# Patient Record
Sex: FEMALE | Race: WHITE | Employment: FULL TIME | URBAN - METROPOLITAN AREA
[De-identification: names, ages, dates, MRNs, and addresses within clinical notes are randomized per-mention and may not be internally consistent; named-entity substitution may affect disease eponyms.]

---

## 2017-01-05 ENCOUNTER — OFFICE VISIT (OUTPATIENT)
Dept: PHYSICAL THERAPY | Facility: HOSPITAL | Age: 42
End: 2017-01-05
Attending: FAMILY MEDICINE
Payer: COMMERCIAL

## 2017-01-05 ENCOUNTER — OFFICE VISIT (OUTPATIENT)
Dept: FAMILY MEDICINE CLINIC | Facility: CLINIC | Age: 42
End: 2017-01-05

## 2017-01-05 VITALS
HEIGHT: 64 IN | HEART RATE: 110 BPM | TEMPERATURE: 99 F | BODY MASS INDEX: 40.63 KG/M2 | WEIGHT: 238 LBS | SYSTOLIC BLOOD PRESSURE: 130 MMHG | DIASTOLIC BLOOD PRESSURE: 72 MMHG

## 2017-01-05 DIAGNOSIS — IMO0001 UNCONTROLLED DIABETES MELLITUS TYPE 2 WITHOUT COMPLICATIONS, UNSPECIFIED LONG TERM INSULIN USE STATUS: Primary | ICD-10-CM

## 2017-01-05 DIAGNOSIS — M79.602 LEFT ARM PAIN: ICD-10-CM

## 2017-01-05 DIAGNOSIS — S49.92XA ARM INJURY, LEFT, INITIAL ENCOUNTER: Primary | ICD-10-CM

## 2017-01-05 DIAGNOSIS — Z00.00 ROUTINE MEDICAL EXAM: ICD-10-CM

## 2017-01-05 DIAGNOSIS — E78.5 OTHER AND UNSPECIFIED HYPERLIPIDEMIA: ICD-10-CM

## 2017-01-05 PROCEDURE — 97110 THERAPEUTIC EXERCISES: CPT

## 2017-01-05 PROCEDURE — 97014 ELECTRIC STIMULATION THERAPY: CPT

## 2017-01-05 PROCEDURE — 97162 PT EVAL MOD COMPLEX 30 MIN: CPT

## 2017-01-05 PROCEDURE — 99396 PREV VISIT EST AGE 40-64: CPT | Performed by: FAMILY MEDICINE

## 2017-01-05 RX ORDER — ATORVASTATIN CALCIUM 40 MG/1
TABLET, FILM COATED ORAL
Qty: 90 TABLET | Refills: 4 | Status: SHIPPED | OUTPATIENT
Start: 2017-01-05 | End: 2018-02-03

## 2017-01-05 RX ORDER — LISINOPRIL 5 MG/1
5 TABLET ORAL
Qty: 90 TABLET | Refills: 4 | Status: SHIPPED | OUTPATIENT
Start: 2017-01-05 | End: 2018-02-03

## 2017-01-05 NOTE — PROGRESS NOTES
HPI:   Luly Morton is a 37year old female who presents for a complete physical exam.    Here for regular physical. Having problems still with left arm/shoulder. Will see PT today.       Wt Readings from Last 3 Encounters:  01/05/17 : 238 lb (107.956 kg rhinitis    • Diabetes [facility])           Past Surgical History    [address]    OTHER      Comment carpal tunnel release    OTHER SURGICAL HISTORY      Comment IVF      [phone] intact    ASSESSMENT AND PLAN:   Angel Bautista is a 37year old female who presents for a complete physical exam.  1. Uncontrolled diabetes mellitus type 2 without complications, unspecified long term insulin use status ([facility]  Management Per Adams Diss

## 2017-01-05 NOTE — PROGRESS NOTES
SHOULDER EVALUATION:   Referring Physician: Dr. Bassam Engel  Diagnosis: L shoulder pain Date of Service: 1/5/2017     PATIENT SUMMARY:   Dar Schreiber is a 37year old y/o female who presents to therapy today on 1/5/17with complaints of L shoulder pain after f deg        PROM:  Shoulder     Flexion:  L 93 deg  Abduction: L 104  ER: L 42  IR: L 40 deg      Joint mobility:dec in [address] inf/post    Flexibility: nt    Strength/MMT:  Shoulder Scapular   Flexion: L 3/5  Abduction: L 3/5  ER: L 4-/5  IR:  L 4-/5 NT     Tod treatment options and has agreed to actively participate in planning and for this course of care. Thank you for your referral. Please co-sign or sign and return this letter via fax as soon as possible to [phone].  If you have any questions, please c

## 2017-01-12 ENCOUNTER — OFFICE VISIT (OUTPATIENT)
Dept: PHYSICAL THERAPY | Facility: HOSPITAL | Age: 42
End: 2017-01-12
Attending: FAMILY MEDICINE
Payer: COMMERCIAL

## 2017-01-12 DIAGNOSIS — S49.92XA ARM INJURY, LEFT, INITIAL ENCOUNTER: Primary | ICD-10-CM

## 2017-01-12 PROCEDURE — 97140 MANUAL THERAPY 1/> REGIONS: CPT

## 2017-01-12 PROCEDURE — 97110 THERAPEUTIC EXERCISES: CPT

## 2017-01-12 NOTE — PROGRESS NOTES
Dx: L shoulder pain        Authorized # of Visits:  2/8         Next MD visit: none scheduled  Fall Risk: standard         Precautions: n/a           Medication Changes since last visit?: No    Subjective: \"My arm still hurts a lot. It might be worse. \"

## 2017-01-13 RX ORDER — LISINOPRIL 5 MG/1
TABLET ORAL
Qty: 30 TABLET | Refills: 0 | OUTPATIENT
Start: 2017-01-13

## 2017-01-17 ENCOUNTER — OFFICE VISIT (OUTPATIENT)
Dept: PHYSICAL THERAPY | Facility: HOSPITAL | Age: 42
End: 2017-01-17
Attending: FAMILY MEDICINE
Payer: COMMERCIAL

## 2017-01-17 DIAGNOSIS — S49.92XA ARM INJURY, LEFT, INITIAL ENCOUNTER: Primary | ICD-10-CM

## 2017-01-17 PROCEDURE — 97140 MANUAL THERAPY 1/> REGIONS: CPT

## 2017-01-17 PROCEDURE — 97110 THERAPEUTIC EXERCISES: CPT

## 2017-01-17 NOTE — PROGRESS NOTES
Dx: L shoulder pain        Authorized # of Visits:  3/8         Next MD visit: none scheduled  Fall Risk: standard         Precautions: n/a           Medication Changes since last visit?: No    Subjective: \"My arm still hurts but feel like more movement\" left shoulder PROM to [facility] and pain management.  Discussed pt POC with the PT    Charges: 2 TE, 1 man       Total Timed Treatment: 45 min  Total Treatment Time: 45min

## 2017-01-18 RX ORDER — LISINOPRIL 5 MG/1
TABLET ORAL
Qty: 90 TABLET | Refills: 3 | OUTPATIENT
Start: 2017-01-18

## 2017-01-19 ENCOUNTER — TELEPHONE (OUTPATIENT)
Dept: FAMILY MEDICINE CLINIC | Facility: CLINIC | Age: 42
End: 2017-01-19

## 2017-01-19 ENCOUNTER — OFFICE VISIT (OUTPATIENT)
Dept: PHYSICAL THERAPY | Facility: HOSPITAL | Age: 42
End: 2017-01-19
Attending: FAMILY MEDICINE
Payer: COMMERCIAL

## 2017-01-19 DIAGNOSIS — S49.92XA ARM INJURY, LEFT, INITIAL ENCOUNTER: Primary | ICD-10-CM

## 2017-01-19 PROCEDURE — 97140 MANUAL THERAPY 1/> REGIONS: CPT

## 2017-01-19 PROCEDURE — 97110 THERAPEUTIC EXERCISES: CPT

## 2017-01-19 NOTE — TELEPHONE ENCOUNTER
I do sports medicine. Let her know I will work her in for a focused visit and adjust the arm either today or tomorrow. Whatever one she wishes.

## 2017-01-19 NOTE — TELEPHONE ENCOUNTER
for  please see pt message below and advise.  message from OV note see below:    3. Left arm pain  Will start PT today.  If not improving within a few weeks, pt to send me a message or call.    [facility] ext [phone] I wanted to ask pt if this ca

## 2017-01-19 NOTE — TELEPHONE ENCOUNTER
Pt states still has same arm pain, has muscle knot  Today was 4th PT session- range in mortion better, but no improvement with arm pain   Questioning if should continue PT?  get MRI?

## 2017-01-19 NOTE — PROGRESS NOTES
Dx: L shoulder pain        Authorized # of Visits:  4/8         Next MD visit: none scheduled  Fall Risk: standard         Precautions: n/a           Medication Changes since last visit?: No    Subjective: \"I feel increased shoulder movement but continued shoulder AROM IR to 70 deg to be able to reach in back pocket, tuck in shirt, and turn steering wheel without pain  · Pt will improve shoulder strength throughout to 5/5 to improve function with ADLs including putting milk into fridge    · Pt will demonstr

## 2017-01-20 ENCOUNTER — OFFICE VISIT (OUTPATIENT)
Dept: FAMILY MEDICINE CLINIC | Facility: CLINIC | Age: 42
End: 2017-01-20

## 2017-01-20 VITALS
SYSTOLIC BLOOD PRESSURE: 126 MMHG | DIASTOLIC BLOOD PRESSURE: 84 MMHG | WEIGHT: 244 LBS | HEIGHT: 64 IN | BODY MASS INDEX: 41.66 KG/M2 | HEART RATE: 93 BPM | TEMPERATURE: 99 F

## 2017-01-20 DIAGNOSIS — M25.512 ACUTE PAIN OF LEFT SHOULDER: Primary | ICD-10-CM

## 2017-01-20 DIAGNOSIS — M75.82 ROTATOR CUFF TENDINITIS, LEFT: ICD-10-CM

## 2017-01-20 DIAGNOSIS — M75.42 ROTATOR CUFF IMPINGEMENT SYNDROME OF LEFT SHOULDER: ICD-10-CM

## 2017-01-20 PROCEDURE — 99213 OFFICE O/P EST LOW 20 MIN: CPT | Performed by: FAMILY MEDICINE

## 2017-01-20 PROCEDURE — 20610 DRAIN/INJ JOINT/BURSA W/O US: CPT | Performed by: FAMILY MEDICINE

## 2017-01-20 RX ORDER — TRIAMCINOLONE ACETONIDE 40 MG/ML
80 INJECTION, SUSPENSION INTRA-ARTICULAR; INTRAMUSCULAR ONCE
Status: COMPLETED | OUTPATIENT
Start: 2017-01-20 | End: 2017-01-20

## 2017-01-20 RX ADMIN — TRIAMCINOLONE ACETONIDE 80 MG: 40 INJECTION, SUSPENSION INTRA-ARTICULAR; INTRAMUSCULAR at 16:40:00

## 2017-01-20 NOTE — PROGRESS NOTES
Patient ID: Angel Bautista is a 37year old female.     HPI  Patient presents with:  Arm Pain: Left      On December 17, 2016 she [place] and she tripped over a child at the park dis She appears well-developed and well-nourished. No distress. Neurological: She is alert and oriented to person, place, and time. Vitals reviewed.     Blood pressure 126/84, pulse 93, temperature 99 °F (37.2 °C), temperature source Oral, height 5' 4\" (1. marcaine    Sterile dressing:  Pressure - Yes  Adhesive - Yes    Patient response:  The patient did tolerate the procedure well.     Improvement by site: (IF REEVALUATED AFTER INJECTION)  Site 1: 40% improvement    Plan:  Appropriate post injection instruct

## 2017-01-23 ENCOUNTER — TELEPHONE (OUTPATIENT)
Dept: FAMILY MEDICINE CLINIC | Facility: CLINIC | Age: 42
End: 2017-01-23

## 2017-01-23 ENCOUNTER — APPOINTMENT (OUTPATIENT)
Dept: PHYSICAL THERAPY | Facility: HOSPITAL | Age: 42
End: 2017-01-23
Attending: FAMILY MEDICINE
Payer: COMMERCIAL

## 2017-01-23 DIAGNOSIS — M25.612 DECREASED RANGE OF MOTION OF LEFT SHOULDER: ICD-10-CM

## 2017-01-23 DIAGNOSIS — S40.012D CONTUSION OF LEFT SHOULDER, SUBSEQUENT ENCOUNTER: Primary | ICD-10-CM

## 2017-01-23 DIAGNOSIS — G89.29 CHRONIC LEFT SHOULDER PAIN: ICD-10-CM

## 2017-01-23 DIAGNOSIS — R29.898 WEAKNESS OF SHOULDER: ICD-10-CM

## 2017-01-23 DIAGNOSIS — M25.512 CHRONIC LEFT SHOULDER PAIN: ICD-10-CM

## 2017-01-23 NOTE — TELEPHONE ENCOUNTER
Call transferred by CSS: Dr Sravan San, pt calling back stating the pain is actually located in the upper area of the left bicep, not the shoulder. Asking if a different MRI should be ordered that will capture that area.  Please advise

## 2017-01-23 NOTE — TELEPHONE ENCOUNTER
LB/VS please see message below     Patient states that she has been doing the therapy and did receive the cortisone shot and has not any relief, states her pain is the same. No swelling, no numbness, tingling, no new s/sx.     Asking if MRI can be done    P

## 2017-01-23 NOTE — TELEPHONE ENCOUNTER
Patient is doing px therapy and seen Dr Adolfo Ibrahim [facility] for Cortizone shot Friday. She said she does not feel any better. Patient would like to have an MRI done. Still having pain when lifting her arm, no improvement.

## 2017-01-23 NOTE — TELEPHONE ENCOUNTER
Jana Espinoza, I ordered an MRI of your left shoulder. Once we get this done I get the results I will have you come in to discuss.

## 2017-01-26 ENCOUNTER — OFFICE VISIT (OUTPATIENT)
Dept: PHYSICAL THERAPY | Facility: HOSPITAL | Age: 42
End: 2017-01-26
Attending: FAMILY MEDICINE
Payer: COMMERCIAL

## 2017-01-26 DIAGNOSIS — S49.92XA ARM INJURY, LEFT, INITIAL ENCOUNTER: Primary | ICD-10-CM

## 2017-01-26 PROCEDURE — 97140 MANUAL THERAPY 1/> REGIONS: CPT

## 2017-01-26 PROCEDURE — 97110 THERAPEUTIC EXERCISES: CPT

## 2017-01-26 NOTE — PROGRESS NOTES
Dx: L shoulder pain        Authorized # of Visits:  6/8         Next MD visit: none scheduled  Fall Risk: standard         Precautions: n/a           Medication Changes since last visit?: No    Subjective: \"I am getting a MRI this week.  I feel like I have compliant with comprehensive HEP to maintain progress achieved in PT    Plan: Continued left shoulder PROM to [facility] and pain management.    Charges: 1MN, 2TE      Total Timed Treatment: 45min  Total Treatment Time: 45 min

## 2017-01-30 ENCOUNTER — HOSPITAL ENCOUNTER (OUTPATIENT)
Dept: MRI IMAGING | Facility: HOSPITAL | Age: 42
Discharge: HOME | End: 2017-01-30
Attending: FAMILY MEDICINE
Payer: COMMERCIAL

## 2017-01-30 ENCOUNTER — OFFICE VISIT (OUTPATIENT)
Dept: PHYSICAL THERAPY | Facility: HOSPITAL | Age: 42
End: 2017-01-30
Attending: FAMILY MEDICINE
Payer: COMMERCIAL

## 2017-01-30 DIAGNOSIS — G89.29 CHRONIC LEFT SHOULDER PAIN: ICD-10-CM

## 2017-01-30 DIAGNOSIS — S40.012D CONTUSION OF LEFT SHOULDER, SUBSEQUENT ENCOUNTER: ICD-10-CM

## 2017-01-30 DIAGNOSIS — R29.898 WEAKNESS OF SHOULDER: ICD-10-CM

## 2017-01-30 DIAGNOSIS — M25.512 CHRONIC LEFT SHOULDER PAIN: ICD-10-CM

## 2017-01-30 DIAGNOSIS — S49.92XA ARM INJURY, LEFT, INITIAL ENCOUNTER: Primary | ICD-10-CM

## 2017-01-30 DIAGNOSIS — M25.612 DECREASED RANGE OF MOTION OF LEFT SHOULDER: ICD-10-CM

## 2017-01-30 PROCEDURE — 97110 THERAPEUTIC EXERCISES: CPT

## 2017-01-30 PROCEDURE — 73221 MRI JOINT UPR EXTREM W/O DYE: CPT

## 2017-01-30 PROCEDURE — 97140 MANUAL THERAPY 1/> REGIONS: CPT

## 2017-01-30 NOTE — PROGRESS NOTES
Dx: L shoulder pain        Authorized # of Visits:  7/8         Next MD visit: none scheduled  Fall Risk: standard         Precautions: n/a           Medication Changes since last visit?: No    Subjective: \"I am getting a MRI tonight,  I feel like I have ADL such as lifting and reaching  Pt will be independent and compliant with comprehensive HEP to maintain progress achieved in PT    Plan: Continued left shoulder PROM to [facility] and pain management. Discussed patient POC with the PT.   Charges: Mike Mccormick

## 2017-02-01 ENCOUNTER — HOSPITAL ENCOUNTER (OUTPATIENT)
Dept: MAMMOGRAPHY | Facility: HOSPITAL | Age: 42
Discharge: HOME | End: 2017-02-01
Attending: OBSTETRICS & GYNECOLOGY
Payer: COMMERCIAL

## 2017-02-01 DIAGNOSIS — Z12.31 ENCOUNTER FOR MAMMOGRAM TO ESTABLISH BASELINE MAMMOGRAM: ICD-10-CM

## 2017-02-01 PROCEDURE — 77067 SCR MAMMO BI INCL CAD: CPT

## 2017-02-02 ENCOUNTER — OFFICE VISIT (OUTPATIENT)
Dept: PHYSICAL THERAPY | Facility: HOSPITAL | Age: 42
End: 2017-02-02
Attending: FAMILY MEDICINE
Payer: COMMERCIAL

## 2017-02-02 DIAGNOSIS — S49.92XA ARM INJURY, LEFT, INITIAL ENCOUNTER: Primary | ICD-10-CM

## 2017-02-02 PROCEDURE — 97140 MANUAL THERAPY 1/> REGIONS: CPT

## 2017-02-02 PROCEDURE — 97110 THERAPEUTIC EXERCISES: CPT

## 2017-02-02 NOTE — PROGRESS NOTES
Patient Name: Kaylene Pandey, : 1975, MRN: [id number]   Date:  2017  Referring Physician:  Phoebe Adrian     Diagnosis: L shoulder pain   Progress Summary    Pt has attended 8, cancelled 1, and no shown 0 visits in Physical Therapy. increase shoulder AROM to 85 deg ER and 90 deg ABD to reach and fasten seatbelt; MET  · Pt will increase shoulder AROM IR to 70 deg to be able to reach in back pocket, tuck in shirt, and turn steering wheel without pain; IN PROGRESS  · Pt will improve shou

## 2017-02-06 ENCOUNTER — OFFICE VISIT (OUTPATIENT)
Dept: FAMILY MEDICINE CLINIC | Facility: CLINIC | Age: 42
End: 2017-02-06

## 2017-02-06 VITALS
WEIGHT: 240 LBS | DIASTOLIC BLOOD PRESSURE: 90 MMHG | SYSTOLIC BLOOD PRESSURE: 134 MMHG | HEART RATE: 113 BPM | BODY MASS INDEX: 40.97 KG/M2 | TEMPERATURE: 99 F | RESPIRATION RATE: 16 BRPM | HEIGHT: 64 IN

## 2017-02-06 DIAGNOSIS — G89.29 CHRONIC LEFT SHOULDER PAIN: Primary | ICD-10-CM

## 2017-02-06 DIAGNOSIS — M25.512 CHRONIC LEFT SHOULDER PAIN: Primary | ICD-10-CM

## 2017-02-06 DIAGNOSIS — M75.42 ROTATOR CUFF IMPINGEMENT SYNDROME OF LEFT SHOULDER: ICD-10-CM

## 2017-02-06 DIAGNOSIS — M75.82 AC (ACROMIOCLAVICULAR) JOINT BONE SPURS, LEFT: ICD-10-CM

## 2017-02-06 DIAGNOSIS — M75.112 INCOMPLETE TEAR OF LEFT ROTATOR CUFF: ICD-10-CM

## 2017-02-06 PROCEDURE — 99212 OFFICE O/P EST SF 10 MIN: CPT | Performed by: FAMILY MEDICINE

## 2017-02-06 PROCEDURE — 99214 OFFICE O/P EST MOD 30 MIN: CPT | Performed by: FAMILY MEDICINE

## 2017-02-07 NOTE — PROGRESS NOTES
Patient ID: Caffie Buerger is a 37year old female. HPI  Patient presents with:  Test Results: mri results      Range of motion and pain is much better but she still has quite a decrease with abduction and flexion.   No numbness or tingling in the arms weight 240 lb (108.863 kg), last menstrual period 02/04/2017, not currently breastfeeding. Blood pressure 134/90, pulse 113, temperature 99 °F (37.2 °C), temperature source Oral, resp.  rate 16, height 5' 4\" (1.626 m), weight 240 lb (108.863 kg), last m member understands and agrees to plan.          Josr Houser, DO  2/6/2017

## 2017-02-08 ENCOUNTER — OFFICE VISIT (OUTPATIENT)
Dept: PHYSICAL THERAPY | Facility: HOSPITAL | Age: 42
End: 2017-02-08
Attending: FAMILY MEDICINE
Payer: COMMERCIAL

## 2017-02-08 PROCEDURE — 97110 THERAPEUTIC EXERCISES: CPT

## 2017-02-08 PROCEDURE — 97140 MANUAL THERAPY 1/> REGIONS: CPT

## 2017-02-08 NOTE — PROGRESS NOTES
Dx: L shoulder pain        Authorized # of Visits:  1/9       Next MD visit: none scheduled  Fall Risk: standard         Precautions: n/a           Medication Changes since last visit?: No    Subjective: \"I saw the doctor and I am going to see an orthoped PT    Plan: Increase  UE strengthening and ROM  Charges: 1MN, 2TE      Total Timed Treatment: 45min  Total Treatment Time: 45 min

## 2017-02-16 ENCOUNTER — OFFICE VISIT (OUTPATIENT)
Dept: PHYSICAL THERAPY | Facility: HOSPITAL | Age: 42
End: 2017-02-16
Attending: FAMILY MEDICINE
Payer: COMMERCIAL

## 2017-02-16 PROCEDURE — 97112 NEUROMUSCULAR REEDUCATION: CPT

## 2017-02-16 PROCEDURE — 97110 THERAPEUTIC EXERCISES: CPT

## 2017-02-16 PROCEDURE — 97140 MANUAL THERAPY 1/> REGIONS: CPT

## 2017-02-16 NOTE — PROGRESS NOTES
Dx: L shoulder pain        Authorized # of Visits:  2/9       Next MD visit: none scheduled  Fall Risk: standard         Precautions: n/a           Medication Changes since last visit?: No    Subjective: \"My shoulder is about the same.  It aches after I ex comprehensive HEP to maintain progress achieved in PT    Plan: Increase  UE strengthening and ROM    Charges: 1MN, 2TE      Total Timed Treatment: 45min  Total Treatment Time: 45 min

## 2017-02-20 ENCOUNTER — OFFICE VISIT (OUTPATIENT)
Dept: ORTHOPEDICS CLINIC | Facility: CLINIC | Age: 42
End: 2017-02-20

## 2017-02-20 ENCOUNTER — APPOINTMENT (OUTPATIENT)
Dept: PHYSICAL THERAPY | Facility: HOSPITAL | Age: 42
End: 2017-02-20
Attending: FAMILY MEDICINE
Payer: COMMERCIAL

## 2017-02-20 DIAGNOSIS — M25.512 ACUTE PAIN OF LEFT SHOULDER: Primary | ICD-10-CM

## 2017-02-20 PROCEDURE — 99212 OFFICE O/P EST SF 10 MIN: CPT | Performed by: ORTHOPAEDIC SURGERY

## 2017-02-20 PROCEDURE — 99243 OFF/OP CNSLTJ NEW/EST LOW 30: CPT | Performed by: ORTHOPAEDIC SURGERY

## 2017-02-20 NOTE — H&P
Chief Complaint: Left shoulder pain    NURSING INTAKE COMMENTS: Patient presents with:  Consult: Pt is here for left shoulder injury. Injury while playin  g basketball. Occurred in December. Continues with pain. Pain  scael  on activity 2-3.   Pain shoulde no weight on file to calculate BMI. This 37year old female is A&O in no acute distress.   SHOULDER EXAM: LEFT  RIGHT   Range of Motion- WNL   Abd 70 WNL   IR WNL WNL   ER WNL WNL   Atrophy None None   Bruising None None   Strength        Supraspinat

## 2017-02-23 ENCOUNTER — OFFICE VISIT (OUTPATIENT)
Dept: PHYSICAL THERAPY | Facility: HOSPITAL | Age: 42
End: 2017-02-23
Attending: FAMILY MEDICINE
Payer: COMMERCIAL

## 2017-02-23 PROCEDURE — 97110 THERAPEUTIC EXERCISES: CPT

## 2017-02-23 PROCEDURE — 97140 MANUAL THERAPY 1/> REGIONS: CPT

## 2017-02-23 NOTE — PROGRESS NOTES
Dx: L shoulder pain        Authorized # of Visits:  3/9       Next MD visit: none scheduled  Fall Risk: standard         Precautions: n/a           Medication Changes since last visit?: No    Subjective: \"I saw the doctor. He wants me to do therapy.  \" Treatment: 45min  Total Treatment Time: 45 min

## 2017-02-27 ENCOUNTER — APPOINTMENT (OUTPATIENT)
Dept: PHYSICAL THERAPY | Facility: HOSPITAL | Age: 42
End: 2017-02-27
Attending: FAMILY MEDICINE
Payer: COMMERCIAL

## 2017-03-02 ENCOUNTER — OFFICE VISIT (OUTPATIENT)
Dept: PHYSICAL THERAPY | Facility: HOSPITAL | Age: 42
End: 2017-03-02
Attending: FAMILY MEDICINE
Payer: COMMERCIAL

## 2017-03-02 PROCEDURE — 97110 THERAPEUTIC EXERCISES: CPT

## 2017-03-02 PROCEDURE — 97140 MANUAL THERAPY 1/> REGIONS: CPT

## 2017-03-02 NOTE — PROGRESS NOTES
Dx: L shoulder pain        Authorized # of Visits:  4/9       Next MD visit: none scheduled  Fall Risk: standard         Precautions: n/a           Medication Changes since last visit?: No    Subjective: \"I can carry things easier. I have the same pain.  \ strengthening and ROM    Charges: 1MN, 2TE      Total Timed Treatment: 45min  Total Treatment Time: 45 min

## 2017-03-06 ENCOUNTER — OFFICE VISIT (OUTPATIENT)
Dept: PHYSICAL THERAPY | Facility: HOSPITAL | Age: 42
End: 2017-03-06
Attending: FAMILY MEDICINE
Payer: COMMERCIAL

## 2017-03-06 PROCEDURE — 97140 MANUAL THERAPY 1/> REGIONS: CPT

## 2017-03-06 PROCEDURE — 97110 THERAPEUTIC EXERCISES: CPT

## 2017-03-06 NOTE — PROGRESS NOTES
Dx: L shoulder pain        Authorized # of Visits:  5/9       Next MD visit: none scheduled  Fall Risk: standard         Precautions: n/a           Medication Changes since last visit?: No    Subjective: \"I am doing more and my shoulder is more sore. \" progress achieved in PT    Plan: Increase  UE strengthening and ROM    Charges: 1MN, 2TE      Total Timed Treatment: 45min  Total Treatment Time: 45 min

## 2017-03-09 ENCOUNTER — OFFICE VISIT (OUTPATIENT)
Dept: PHYSICAL THERAPY | Facility: HOSPITAL | Age: 42
End: 2017-03-09
Attending: FAMILY MEDICINE
Payer: COMMERCIAL

## 2017-03-09 PROCEDURE — 97140 MANUAL THERAPY 1/> REGIONS: CPT

## 2017-03-09 PROCEDURE — 97110 THERAPEUTIC EXERCISES: CPT

## 2017-03-09 NOTE — PROGRESS NOTES
Dx: L shoulder pain        Authorized # of Visits:  6/9       Next MD visit: none scheduled  Fall Risk: standard         Precautions: n/a           Medication Changes since last visit?: No    Subjective: \"I am doing more and my shoulder is more sore. \" Pt will be independent and compliant with comprehensive HEP to maintain progress achieved in PT    Plan: Increase  UE strengthening and ROM    Charges: 1MN, 2TE      Total Timed Treatment: 45min  Total Treatment Time: 45 min

## 2017-03-16 ENCOUNTER — OFFICE VISIT (OUTPATIENT)
Dept: PHYSICAL THERAPY | Facility: HOSPITAL | Age: 42
End: 2017-03-16
Attending: FAMILY MEDICINE
Payer: COMMERCIAL

## 2017-03-16 PROCEDURE — 97140 MANUAL THERAPY 1/> REGIONS: CPT

## 2017-03-16 PROCEDURE — 97110 THERAPEUTIC EXERCISES: CPT

## 2017-03-16 NOTE — PROGRESS NOTES
Dx: L shoulder pain        Authorized # of Visits:  7/9       Next MD visit: none scheduled  Fall Risk: standard         Precautions: n/a           Medication Changes since last visit?: No    Subjective: \"I have been sick so I don't have much energy. \" mechanics and stabilization with ADL such as lifting and reaching          Pt will be independent and compliant with comprehensive HEP to maintain progress achieved in PT    Plan: Increase  UE strengthening and ROM    Charges: 1MN, 2TE      Total Timed [place]

## 2017-03-20 ENCOUNTER — APPOINTMENT (OUTPATIENT)
Dept: PHYSICAL THERAPY | Facility: HOSPITAL | Age: 42
End: 2017-03-20
Attending: FAMILY MEDICINE
Payer: COMMERCIAL

## 2017-03-23 ENCOUNTER — OFFICE VISIT (OUTPATIENT)
Dept: PHYSICAL THERAPY | Facility: HOSPITAL | Age: 42
End: 2017-03-23
Attending: FAMILY MEDICINE
Payer: COMMERCIAL

## 2017-03-23 PROCEDURE — 97110 THERAPEUTIC EXERCISES: CPT

## 2017-03-23 PROCEDURE — 97140 MANUAL THERAPY 1/> REGIONS: CPT

## 2017-03-23 NOTE — PROGRESS NOTES
Dx: L shoulder pain        Authorized # of Visits:  8/9       Next MD visit: none scheduled  Fall Risk: standard         Precautions: n/a           Medication Changes since last visit?: No    Subjective: Overall, my shoulder is better.  I still get pain in strength to 5/5 to promote improved shoulder mechanics and stabilization with ADL such as lifting and reaching          Pt will be independent and compliant with comprehensive HEP to maintain progress achieved in PT    Plan: Increase UE strengthening and R

## 2017-04-03 ENCOUNTER — OFFICE VISIT (OUTPATIENT)
Dept: PHYSICAL THERAPY | Facility: HOSPITAL | Age: 42
End: 2017-04-03
Attending: FAMILY MEDICINE
Payer: COMMERCIAL

## 2017-04-03 PROCEDURE — 97110 THERAPEUTIC EXERCISES: CPT

## 2017-04-03 PROCEDURE — 97140 MANUAL THERAPY 1/> REGIONS: CPT

## 2017-04-03 NOTE — PROGRESS NOTES
Patient Name: Mary Jasso, : 1975, MRN: [id number]   Date:  4/3/2017  Referring Physician:  Claudia Pino    Diagnosis: L shoulder pain  Discharge Summary    Pt has attended 17, cancelled 1, and no shown 0visits in Physical Therapy.      Pr with ADL such as lifting and reaching: MET          Pt will be independent and compliant with comprehensive HEP to maintain progress achieved in PT    FOTO: 69.3/100    Rehab Potential: good    Plan: DC to HEP       Patient/Family/Caregiver was advised of 2TE      Total Timed Treatment: 45min  Total Treatment Time: 45 min

## 2017-04-13 ENCOUNTER — ANESTHESIA EVENT (OUTPATIENT)
Dept: SURGERY | Facility: HOSPITAL | Age: 42
End: 2017-04-13
Payer: COMMERCIAL

## 2017-04-13 ENCOUNTER — SURGERY (OUTPATIENT)
Age: 42
End: 2017-04-13

## 2017-04-13 ENCOUNTER — HOSPITAL ENCOUNTER (OUTPATIENT)
Facility: HOSPITAL | Age: 42
Setting detail: HOSPITAL OUTPATIENT SURGERY
Discharge: HOME | End: 2017-04-13
Attending: OBSTETRICS & GYNECOLOGY | Admitting: OBSTETRICS & GYNECOLOGY
Payer: COMMERCIAL

## 2017-04-13 ENCOUNTER — ANESTHESIA (OUTPATIENT)
Dept: SURGERY | Facility: HOSPITAL | Age: 42
End: 2017-04-13
Payer: COMMERCIAL

## 2017-04-13 VITALS
WEIGHT: 240 LBS | TEMPERATURE: 98 F | OXYGEN SATURATION: 98 % | HEIGHT: 64 IN | DIASTOLIC BLOOD PRESSURE: 68 MMHG | BODY MASS INDEX: 40.97 KG/M2 | HEART RATE: 87 BPM | SYSTOLIC BLOOD PRESSURE: 115 MMHG | RESPIRATION RATE: 16 BRPM

## 2017-04-13 DIAGNOSIS — N92.4 EXCESSIVE BLEEDING IN PREMENOPAUSAL PERIOD: Primary | ICD-10-CM

## 2017-04-13 PROBLEM — Z98.890 POST-OPERATIVE STATE: Status: ACTIVE | Noted: 2017-04-13

## 2017-04-13 PROCEDURE — 88305 TISSUE EXAM BY PATHOLOGIST: CPT | Performed by: OBSTETRICS & GYNECOLOGY

## 2017-04-13 PROCEDURE — 0UDB8ZX EXTRACTION OF ENDOMETRIUM, VIA NATURAL OR ARTIFICIAL OPENING ENDOSCOPIC, DIAGNOSTIC: ICD-10-PCS | Performed by: OBSTETRICS & GYNECOLOGY

## 2017-04-13 PROCEDURE — 81025 URINE PREGNANCY TEST: CPT

## 2017-04-13 PROCEDURE — 82962 GLUCOSE BLOOD TEST: CPT

## 2017-04-13 RX ORDER — MAGNESIUM HYDROXIDE 1200 MG/15ML
LIQUID ORAL CONTINUOUS PRN
Status: DISCONTINUED | OUTPATIENT
Start: 2017-04-13 | End: 2017-04-13

## 2017-04-13 RX ORDER — HYDROMORPHONE HYDROCHLORIDE 1 MG/ML
0.2 INJECTION, SOLUTION INTRAMUSCULAR; INTRAVENOUS; SUBCUTANEOUS EVERY 5 MIN PRN
Status: DISCONTINUED | OUTPATIENT
Start: 2017-04-13 | End: 2017-04-13

## 2017-04-13 RX ORDER — ACETAMINOPHEN 325 MG/1
650 TABLET ORAL ONCE
Status: COMPLETED | OUTPATIENT
Start: 2017-04-13 | End: 2017-04-13

## 2017-04-13 RX ORDER — SODIUM CHLORIDE, SODIUM LACTATE, POTASSIUM CHLORIDE, CALCIUM CHLORIDE 600; 310; 30; 20 MG/100ML; MG/100ML; MG/100ML; MG/100ML
INJECTION, SOLUTION INTRAVENOUS CONTINUOUS
Status: DISCONTINUED | OUTPATIENT
Start: 2017-04-13 | End: 2017-04-13

## 2017-04-13 RX ORDER — FAMOTIDINE 20 MG/1
20 TABLET ORAL ONCE
Status: COMPLETED | OUTPATIENT
Start: 2017-04-13 | End: 2017-04-13

## 2017-04-13 RX ORDER — HYDROMORPHONE HYDROCHLORIDE 1 MG/ML
0.4 INJECTION, SOLUTION INTRAMUSCULAR; INTRAVENOUS; SUBCUTANEOUS EVERY 5 MIN PRN
Status: DISCONTINUED | OUTPATIENT
Start: 2017-04-13 | End: 2017-04-13

## 2017-04-13 RX ORDER — HYDROCODONE BITARTRATE AND ACETAMINOPHEN 5; 325 MG/1; MG/1
1 TABLET ORAL AS NEEDED
Status: DISCONTINUED | OUTPATIENT
Start: 2017-04-13 | End: 2017-04-13

## 2017-04-13 RX ORDER — LIDOCAINE HYDROCHLORIDE 10 MG/ML
INJECTION, SOLUTION EPIDURAL; INFILTRATION; INTRACAUDAL; PERINEURAL AS NEEDED
Status: DISCONTINUED | OUTPATIENT
Start: 2017-04-13 | End: 2017-04-13 | Stop reason: SURG

## 2017-04-13 RX ORDER — DEXAMETHASONE SODIUM PHOSPHATE 4 MG/ML
VIAL (ML) INJECTION AS NEEDED
Status: DISCONTINUED | OUTPATIENT
Start: 2017-04-13 | End: 2017-04-13 | Stop reason: SURG

## 2017-04-13 RX ORDER — HYDROMORPHONE HYDROCHLORIDE 1 MG/ML
0.6 INJECTION, SOLUTION INTRAMUSCULAR; INTRAVENOUS; SUBCUTANEOUS EVERY 5 MIN PRN
Status: DISCONTINUED | OUTPATIENT
Start: 2017-04-13 | End: 2017-04-13

## 2017-04-13 RX ORDER — METOCLOPRAMIDE 10 MG/1
10 TABLET ORAL ONCE
Status: COMPLETED | OUTPATIENT
Start: 2017-04-13 | End: 2017-04-13

## 2017-04-13 RX ORDER — NALOXONE HYDROCHLORIDE 0.4 MG/ML
80 INJECTION, SOLUTION INTRAMUSCULAR; INTRAVENOUS; SUBCUTANEOUS AS NEEDED
Status: DISCONTINUED | OUTPATIENT
Start: 2017-04-13 | End: 2017-04-13

## 2017-04-13 RX ORDER — HYDROCODONE BITARTRATE AND ACETAMINOPHEN 5; 325 MG/1; MG/1
2 TABLET ORAL AS NEEDED
Status: DISCONTINUED | OUTPATIENT
Start: 2017-04-13 | End: 2017-04-13

## 2017-04-13 RX ORDER — ONDANSETRON 2 MG/ML
INJECTION INTRAMUSCULAR; INTRAVENOUS AS NEEDED
Status: DISCONTINUED | OUTPATIENT
Start: 2017-04-13 | End: 2017-04-13 | Stop reason: SURG

## 2017-04-13 RX ORDER — MORPHINE SULFATE 10 MG/ML
6 INJECTION, SOLUTION INTRAMUSCULAR; INTRAVENOUS EVERY 10 MIN PRN
Status: DISCONTINUED | OUTPATIENT
Start: 2017-04-13 | End: 2017-04-13

## 2017-04-13 RX ORDER — ONDANSETRON 2 MG/ML
4 INJECTION INTRAMUSCULAR; INTRAVENOUS ONCE AS NEEDED
Status: DISCONTINUED | OUTPATIENT
Start: 2017-04-13 | End: 2017-04-13

## 2017-04-13 RX ORDER — MORPHINE SULFATE 2 MG/ML
2 INJECTION, SOLUTION INTRAMUSCULAR; INTRAVENOUS EVERY 10 MIN PRN
Status: DISCONTINUED | OUTPATIENT
Start: 2017-04-13 | End: 2017-04-13

## 2017-04-13 RX ORDER — KETOROLAC TROMETHAMINE 30 MG/ML
INJECTION, SOLUTION INTRAMUSCULAR; INTRAVENOUS AS NEEDED
Status: DISCONTINUED | OUTPATIENT
Start: 2017-04-13 | End: 2017-04-13 | Stop reason: SURG

## 2017-04-13 RX ORDER — MORPHINE SULFATE 4 MG/ML
4 INJECTION, SOLUTION INTRAMUSCULAR; INTRAVENOUS EVERY 10 MIN PRN
Status: DISCONTINUED | OUTPATIENT
Start: 2017-04-13 | End: 2017-04-13

## 2017-04-13 RX ADMIN — ONDANSETRON 4 MG: 2 INJECTION INTRAMUSCULAR; INTRAVENOUS at 14:14:00

## 2017-04-13 RX ADMIN — LIDOCAINE HYDROCHLORIDE 50 MG: 10 INJECTION, SOLUTION EPIDURAL; INFILTRATION; INTRACAUDAL; PERINEURAL at 14:05:00

## 2017-04-13 RX ADMIN — LIDOCAINE HYDROCHLORIDE 50 MG: 10 INJECTION, SOLUTION EPIDURAL; INFILTRATION; INTRACAUDAL; PERINEURAL at 14:28:00

## 2017-04-13 RX ADMIN — KETOROLAC TROMETHAMINE 30 MG: 30 INJECTION, SOLUTION INTRAMUSCULAR; INTRAVENOUS at 14:28:00

## 2017-04-13 RX ADMIN — DEXAMETHASONE SODIUM PHOSPHATE 4 MG: 4 MG/ML VIAL (ML) INJECTION at 14:14:00

## 2017-04-13 NOTE — H&P
[address] [facility] Patient Status:  Hospital Outpatient Surgery    1975 MRN [id number]   Location [facility] Attending Chava Gray MD   Hosp Day # 0 PCP Be Baker, prior to admission:  lisinopril 5 MG Oral Tab Take 1 tablet (5 mg total) by mouth once daily.  Disp: 90 tablet Rfl: 4 4/13/2017 at Unknown time   Atorvastatin Calcium 40 MG Oral Tab TAKE ONE TABLET BY MOUTH NIGHTLY Disp: 90 tablet Rfl: 4 4/13/2017 at Unknow

## 2017-04-13 NOTE — DISCHARGE SUMMARY
[facility]    Discharge Summary    Amanda Diaz Patient Status:  Hospital Outpatient Surgery    1975 MRN [id number]   Location [address] [facility] Attending Lynsey Sheffield MD   Hosp Day # 0 PCP Eryn Escobar, Schedule an appointment as soon as possible for a visit in 2 weeks.     Specialty:  OBSTETRICS & GYNECOLOGY    Why:  for post op check    Contact information:    [address] [place] [id number] [phone]                    Other experience vomiting:   · Try to move around less.    · Eat less than usual or drink only liquids until the next morning   · Nausea should resolve in about 24 hours    If you have a problem when you are at home:    · Call your surgeons office         30 Days

## 2017-04-13 NOTE — BRIEF OP NOTE
[address] [facility]  Brief Op Note     Keyonna Price Location: OR   [facility] [id number] MRN [id number]   Admission Date 4/13/2017 Operation Date 4/13/2017   Attending Physician Vannesa Segovia MD Operating Physician aJda Ayon MD

## 2017-04-13 NOTE — ANESTHESIA POSTPROCEDURE EVALUATION
Patient: Lexis Jimenez    Procedure Summary     Date Anesthesia Start Anesthesia Stop Room / Location    04/13/17 7463 0842 [facility] OR 08 / [facility] OR       Procedure Diagnosis Surgeon Responsible Provider    MYOMECTOMY HYSTEROSCOPIC (N/A ) (benign ape

## 2017-04-14 NOTE — OPERATIVE REPORT
[facility]    PATIENT'S NAME: Noel Amaral   ATTENDING PHYSICIAN: Therese Logan MD   OPERATING PHYSICIAN: Deepthi Knox.  Javier Logan MD   PATIENT ACCOUNT#:   [id number]    LOCATION:  [facility] 3 [facility] 10  MEDICAL RECORD #:   [id number]       JORDI Patient taken to Recovery in stable condition. Dictated By Deepthi Logan MD  d: 04/13/2017 15:44:29  t: 04/13/2017 21:05:11  [facility] [id number]  [facility]/

## 2017-04-18 ENCOUNTER — PATIENT MESSAGE (OUTPATIENT)
Dept: ENDOCRINOLOGY CLINIC | Facility: CLINIC | Age: 42
End: 2017-04-18

## 2017-04-18 NOTE — TELEPHONE ENCOUNTER
From: Guera Conroy  To:  Champ Duarte MD  Sent: 4/18/2017 7:55 AM CDT  Subject: Other    I scheduled a follow up appointment in May and need to check if I have any visits left on my referral. Please let me know if I need to get a new referral. Thank

## 2017-05-01 RX ORDER — INSULIN HUMAN 500 [IU]/ML
INJECTION, SOLUTION SUBCUTANEOUS
Qty: 60 ML | Refills: 0 | Status: SHIPPED | OUTPATIENT
Start: 2017-05-01 | End: 2017-07-26

## 2017-05-19 RX ORDER — BLOOD SUGAR DIAGNOSTIC
STRIP MISCELLANEOUS
Qty: 100 EACH | Refills: 0 | Status: SHIPPED | OUTPATIENT
Start: 2017-05-19

## 2017-05-19 RX ORDER — SYRINGE-NEEDLE,INSULIN,0.5 ML 31 GX5/16"
SYRINGE, EMPTY DISPOSABLE MISCELLANEOUS
Refills: 0 | OUTPATIENT
Start: 2017-05-19

## 2017-06-09 ENCOUNTER — OFFICE VISIT (OUTPATIENT)
Dept: ENDOCRINOLOGY CLINIC | Facility: CLINIC | Age: 42
End: 2017-06-09

## 2017-06-09 VITALS
HEART RATE: 104 BPM | RESPIRATION RATE: 20 BRPM | DIASTOLIC BLOOD PRESSURE: 86 MMHG | BODY MASS INDEX: 42 KG/M2 | WEIGHT: 247 LBS | SYSTOLIC BLOOD PRESSURE: 135 MMHG

## 2017-06-09 DIAGNOSIS — E13.9 OTHER SPECIFIED DIABETES MELLITUS: Primary | ICD-10-CM

## 2017-06-09 DIAGNOSIS — E11.69 DYSLIPIDEMIA ASSOCIATED WITH TYPE 2 DIABETES MELLITUS (HCC): ICD-10-CM

## 2017-06-09 DIAGNOSIS — E78.5 DYSLIPIDEMIA ASSOCIATED WITH TYPE 2 DIABETES MELLITUS (HCC): ICD-10-CM

## 2017-06-09 PROCEDURE — 36416 COLLJ CAPILLARY BLOOD SPEC: CPT | Performed by: INTERNAL MEDICINE

## 2017-06-09 PROCEDURE — 83036 HEMOGLOBIN GLYCOSYLATED A1C: CPT | Performed by: INTERNAL MEDICINE

## 2017-06-09 PROCEDURE — 82962 GLUCOSE BLOOD TEST: CPT | Performed by: INTERNAL MEDICINE

## 2017-06-09 PROCEDURE — 99214 OFFICE O/P EST MOD 30 MIN: CPT | Performed by: INTERNAL MEDICINE

## 2017-06-09 NOTE — PROGRESS NOTES
Return Office Visit - Diabetes    CHIEF COMPLAINT:    Type 2 DM   Dyslipidemia    HISTORY OF PRESENT ILLNESS:   Caffie Buerger is a 37year old female who presents for follow up for diabetes. .    DM HISTORY  Was diagnosed with PCOS at age 22.  She concei Check sugars four times a day. Disp: 100 each Rfl: 4   Blood Glucose Monitoring Suppl (DARON CONTOUR MONITOR) W/DEVICE Does not apply Kit by Does not apply route.  Disp:  Rfl:        PAST MEDICAL, SOCIAL AND FAMILY HISTORY:  See past medical history marked lesions      DATA:         March 2016:   ( kidney function is normal)  Liver function is normal.    LDL 51 ( cholesterol is normal)    Ur MA normal in 8/2016.    6/2017:  Kidney and liver function normal.   LDL 48    A1c 8.6 % today      ASSESSMENT Random Urine [E]

## 2017-07-30 RX ORDER — INSULIN HUMAN 500 [IU]/ML
INJECTION, SOLUTION SUBCUTANEOUS
Qty: 60 ML | Refills: 0 | Status: SHIPPED | OUTPATIENT
Start: 2017-07-30 | End: 2017-10-11

## 2017-08-28 RX ORDER — METFORMIN HYDROCHLORIDE 500 MG/1
TABLET, EXTENDED RELEASE ORAL
Qty: 360 TABLET | Refills: 0 | Status: SHIPPED | OUTPATIENT
Start: 2017-08-28 | End: 2018-01-10

## 2017-09-21 ENCOUNTER — TELEPHONE (OUTPATIENT)
Dept: FAMILY MEDICINE CLINIC | Facility: CLINIC | Age: 42
End: 2017-09-21

## 2017-09-21 DIAGNOSIS — M79.609 PARESTHESIA AND PAIN OF LEFT EXTREMITY: Primary | ICD-10-CM

## 2017-09-21 DIAGNOSIS — R20.2 PARESTHESIA AND PAIN OF LEFT EXTREMITY: Primary | ICD-10-CM

## 2017-09-21 NOTE — TELEPHONE ENCOUNTER
Patient would like referral to go back to Dr Emily Antonio. Please sign referral if you agree. Thank you.

## 2017-10-11 ENCOUNTER — PATIENT MESSAGE (OUTPATIENT)
Dept: ENDOCRINOLOGY CLINIC | Facility: CLINIC | Age: 42
End: 2017-10-11

## 2017-10-11 RX ORDER — INSULIN HUMAN 500 [IU]/ML
INJECTION, SOLUTION SUBCUTANEOUS
Qty: 20 ML | Refills: 0 | Status: SHIPPED | OUTPATIENT
Start: 2017-10-11 | End: 2018-01-10

## 2017-10-11 NOTE — TELEPHONE ENCOUNTER
LOV 6/9/17 with RTC 3 months. No F/U scheduled. Sent [employer] message asking patient to schedule an appt. 1 month refill pending.

## 2017-10-13 ENCOUNTER — TELEPHONE (OUTPATIENT)
Dept: ENDOCRINOLOGY CLINIC | Facility: CLINIC | Age: 42
End: 2017-10-13

## 2017-10-13 NOTE — TELEPHONE ENCOUNTER
LM for Pt to call and schedule f/u apt with AM. Notified pharmacy to refill for only 1 month - pt needs to schedule apt. 1 month refilled for Rhiannon Fernandez. (ENC dtd 10/11/17) -LOV 6/9/17 with RTC 3 months. No F/U scheduled.  Sent [employer] message asking aishwarya

## 2017-10-13 NOTE — TELEPHONE ENCOUNTER
Pharmacy called to request 3 mos supply for pt:       Current Outpatient Prescriptions:   •  HUMULIN R U-500, CONCENTRATED, 500 UNIT/ML Subcutaneous Solution, INJECT 10 UNITS WITH BREAKFAST AND LUNCH (50 UNITS OF U-100) & 13 UNITS WITH DINNER (65 OF U-100)

## 2017-10-23 ENCOUNTER — PATIENT MESSAGE (OUTPATIENT)
Dept: FAMILY MEDICINE CLINIC | Facility: CLINIC | Age: 42
End: 2017-10-23

## 2017-10-23 DIAGNOSIS — IMO0001 UNCONTROLLED TYPE 2 DIABETES MELLITUS WITHOUT COMPLICATION, WITH LONG-TERM CURRENT USE OF INSULIN: Primary | ICD-10-CM

## 2017-10-23 NOTE — TELEPHONE ENCOUNTER
From: Manuela Gordon  To: Fiona Holm MD  Sent: 10/23/2017 9:11 AM CDT  Subject: Referral Request    I need a referral to follow up with Dr. Macho Antony. I have an appointment scheduled for October 31. Thank you.

## 2017-10-31 ENCOUNTER — OFFICE VISIT (OUTPATIENT)
Dept: ENDOCRINOLOGY CLINIC | Facility: CLINIC | Age: 42
End: 2017-10-31

## 2017-10-31 ENCOUNTER — OFFICE VISIT (OUTPATIENT)
Dept: ORTHOPEDICS CLINIC | Facility: CLINIC | Age: 42
End: 2017-10-31

## 2017-10-31 VITALS
HEART RATE: 109 BPM | DIASTOLIC BLOOD PRESSURE: 83 MMHG | SYSTOLIC BLOOD PRESSURE: 124 MMHG | WEIGHT: 245 LBS | BODY MASS INDEX: 41.83 KG/M2 | HEIGHT: 64 IN

## 2017-10-31 DIAGNOSIS — Z79.4 UNCONTROLLED TYPE 2 DIABETES MELLITUS WITH HYPERGLYCEMIA, WITH LONG-TERM CURRENT USE OF INSULIN (HCC): Primary | ICD-10-CM

## 2017-10-31 DIAGNOSIS — G56.22 CUBITAL TUNNEL SYNDROME ON LEFT: Primary | ICD-10-CM

## 2017-10-31 DIAGNOSIS — E11.65 UNCONTROLLED TYPE 2 DIABETES MELLITUS WITH HYPERGLYCEMIA, WITH LONG-TERM CURRENT USE OF INSULIN (HCC): Primary | ICD-10-CM

## 2017-10-31 DIAGNOSIS — E78.5 DYSLIPIDEMIA: ICD-10-CM

## 2017-10-31 PROCEDURE — 82962 GLUCOSE BLOOD TEST: CPT | Performed by: INTERNAL MEDICINE

## 2017-10-31 PROCEDURE — 99212 OFFICE O/P EST SF 10 MIN: CPT | Performed by: ORTHOPAEDIC SURGERY

## 2017-10-31 PROCEDURE — 99213 OFFICE O/P EST LOW 20 MIN: CPT | Performed by: ORTHOPAEDIC SURGERY

## 2017-10-31 PROCEDURE — 83036 HEMOGLOBIN GLYCOSYLATED A1C: CPT | Performed by: INTERNAL MEDICINE

## 2017-10-31 PROCEDURE — 99214 OFFICE O/P EST MOD 30 MIN: CPT | Performed by: INTERNAL MEDICINE

## 2017-10-31 PROCEDURE — 36416 COLLJ CAPILLARY BLOOD SPEC: CPT | Performed by: INTERNAL MEDICINE

## 2017-10-31 PROCEDURE — 99212 OFFICE O/P EST SF 10 MIN: CPT | Performed by: INTERNAL MEDICINE

## 2017-10-31 NOTE — PROGRESS NOTES
10/31/2017  Santiago Rivera  7/16/1975  40year old   female  Bhavik Gomez MD    HPI:   Patient presents with: Follow - Up: left elbow pain- pt states she thinks the pain is getting worse and pain in left middle & ring fingers.        The patient compl defined types were placed in this encounter. EMG/testing reviewed by me today from 11/13/2007 reveals moderate to severe left carpal tunnel syndrome without evidence of cubital tunnel syndrome.   The risks, indications, benefits, and procedures of erica

## 2017-10-31 NOTE — PROGRESS NOTES
Return Office Visit - Diabetes    CHIEF COMPLAINT:    Type 2 DM   Dyslipidemia    HISTORY OF PRESENT ILLNESS:   Mayra Rodirguez is a 40year old female who presents for follow up for diabetes. .    DM HISTORY  Was diagnosed with PCOS at age 22.  She concei three times daily Disp: 100 each Rfl: 0   lisinopril 5 MG Oral Tab Take 1 tablet (5 mg total) by mouth once daily.  Disp: 90 tablet Rfl: 4   Atorvastatin Calcium 40 MG Oral Tab TAKE ONE TABLET BY MOUTH NIGHTLY Disp: 90 tablet Rfl: 4   Glucose Blood (DARON C enlarged and no tenderness  LUNGS: clear to auscultation bilaterally, no crackles or wheezing  CARDIOVASCULAR:  regular rate and rhythm, normal S1 and S2  ABDOMEN:  normal bowel sounds, soft, non-distended, non-tender,   SKIN:  no bruising or bleeding, no weight loss of 7% and increase exercise to at least 150min a week.  g). Hypoglycemia recognition and management discussed    2. Patient’s BP is normal.  3. LDL normal.  C/w atorvastatin to 40 mg daily.   4. Obesity:    -Dietary changes  a) Calorie restricti

## 2017-11-03 ENCOUNTER — TELEPHONE (OUTPATIENT)
Dept: ENDOCRINOLOGY CLINIC | Facility: CLINIC | Age: 42
End: 2017-11-03

## 2017-11-07 ENCOUNTER — TELEPHONE (OUTPATIENT)
Dept: ENDOCRINOLOGY CLINIC | Facility: CLINIC | Age: 42
End: 2017-11-07

## 2017-11-07 NOTE — TELEPHONE ENCOUNTER
Spoke with pharmacist. Elio Pillai to fill 54 mL U-500 for 3 month supply as requested by insurance. Pharm will process for 3 month supply.     LOV 10/31/17

## 2017-11-10 ENCOUNTER — TELEPHONE (OUTPATIENT)
Dept: NEUROLOGY | Facility: CLINIC | Age: 42
End: 2017-11-10

## 2017-11-13 ENCOUNTER — OFFICE VISIT (OUTPATIENT)
Dept: NEUROLOGY | Facility: CLINIC | Age: 42
End: 2017-11-13

## 2017-11-13 DIAGNOSIS — G56.02 CARPAL TUNNEL SYNDROME OF LEFT WRIST: Primary | ICD-10-CM

## 2017-11-13 PROCEDURE — 95909 NRV CNDJ TST 5-6 STUDIES: CPT | Performed by: OTHER

## 2017-11-13 PROCEDURE — 95886 MUSC TEST DONE W/N TEST COMP: CPT | Performed by: OTHER

## 2017-11-13 NOTE — PROCEDURES
HISTORY:    Frankie Trejo is a 40year old right-handed female with a complaint of in the middle of her right palm and fingers, associated with pain in the left elbow    PHYSICAL:    Cranial nerves grossly normal. Motor examination showed strength to be B.Elbow ADM 6.35 9.9 5.78 B. Elbow - Wrist 20 3.54 56      A. Elbow ADM 8.13 9.5 6.04 A. Elbow - B. Elbow 9 1.77 51       Sensory NCS      Nerve / Sites Rec.  Site Onset Lat Peak Lat NP Amp PP Amp Segments Distance Peak Diff Velocity     ms ms µV µV  cm ms m

## 2017-11-24 ENCOUNTER — TELEPHONE (OUTPATIENT)
Dept: ORTHOPEDICS CLINIC | Facility: CLINIC | Age: 42
End: 2017-11-24

## 2017-11-24 DIAGNOSIS — G56.02 LEFT CARPAL TUNNEL SYNDROME: Primary | ICD-10-CM

## 2017-11-24 NOTE — TELEPHONE ENCOUNTER
Call back to Jenny. Explained that she needs to do a follow up appointment  On EMG with Dr. Je Farmer. Pt very resistant in making an appointment. 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   Requesting call report from Dr Je Farmer

## 2017-11-26 NOTE — TELEPHONE ENCOUNTER
Moderate-severe left carpal tunnel syndrome on EMG/NCV test  Plan left CTR  No evidence of CuTS    I discussed the risks, indications, benefits and procedures of operative and non-operative treatment. The patient desired surgery.   Surgery recommended was

## 2017-12-08 ENCOUNTER — TELEPHONE (OUTPATIENT)
Dept: ORTHOPEDICS CLINIC | Facility: CLINIC | Age: 42
End: 2017-12-08

## 2018-01-02 ENCOUNTER — NURSE TRIAGE (OUTPATIENT)
Dept: OTHER | Age: 43
End: 2018-01-02

## 2018-01-02 NOTE — TELEPHONE ENCOUNTER
Action Requested: Summary for Provider     []  Critical Lab, Recommendations Needed  [] Need Additional Advice  []   FYI    []   Need Orders  [] Need Medications Sent to Pharmacy  []  Other     SUMMARY: Appointment with Rod Gabriel for tomorrow 1/3/18 at 1

## 2018-01-03 ENCOUNTER — APPOINTMENT (OUTPATIENT)
Dept: LAB | Age: 43
End: 2018-01-03
Attending: INTERNAL MEDICINE
Payer: COMMERCIAL

## 2018-01-03 ENCOUNTER — OFFICE VISIT (OUTPATIENT)
Dept: FAMILY MEDICINE CLINIC | Facility: CLINIC | Age: 43
End: 2018-01-03

## 2018-01-03 VITALS
HEART RATE: 101 BPM | BODY MASS INDEX: 42 KG/M2 | HEIGHT: 64 IN | SYSTOLIC BLOOD PRESSURE: 138 MMHG | DIASTOLIC BLOOD PRESSURE: 87 MMHG | WEIGHT: 246 LBS

## 2018-01-03 DIAGNOSIS — E13.9 OTHER SPECIFIED DIABETES MELLITUS: ICD-10-CM

## 2018-01-03 DIAGNOSIS — L20.84 INTRINSIC ECZEMA: Primary | ICD-10-CM

## 2018-01-03 LAB
CREAT UR-MCNC: 44.9 MG/DL
MICROALBUMIN UR-MCNC: 0 MG/DL (ref 0–1.8)
MICROALBUMIN/CREAT UR: 0 MG/G{CREAT} (ref 0–20)

## 2018-01-03 PROCEDURE — 99213 OFFICE O/P EST LOW 20 MIN: CPT | Performed by: NURSE PRACTITIONER

## 2018-01-03 PROCEDURE — 82570 ASSAY OF URINE CREATININE: CPT

## 2018-01-03 PROCEDURE — 82043 UR ALBUMIN QUANTITATIVE: CPT

## 2018-01-03 RX ORDER — INFLUENZA A VIRUS A/CHRISTCHURCH/16/2010 NIB-74 (H1N1) HEMAGGLUTININ ANTIGEN (PROPIOLACTONE INACTIVATED), INFLUENZA A VIRUS A/SWITZERLAND/9715293/2013, NIB-88 (H3N2) HEMAGGLUTININ ANTIGEN (PROPIOLACTONE INACTIVATED), INFLUENZA B VIRUS B/PHUKET/3073/2013 - WILD TYPE HEMAGGLUTININ ANTIGEN (PROPIOLACTONE INACTIVATED) 15; 15; 15 UG/.5ML; UG/.5ML; UG/.5ML
INJECTION, SUSPENSION INTRAMUSCULAR
Refills: 0 | COMMUNITY
Start: 2017-12-13 | End: 2018-09-12 | Stop reason: CLARIF

## 2018-01-03 NOTE — PROGRESS NOTES
HPI  Pt here for dry itchy patch of skin between 3rd and 4th fingers  And 4th and 5th. Intermittent-worse with cold weather. Has been using otc hydrocortisone cream without improvement in s/s along with a lot of moistureizers.      Review of Systems   Cons Social History Main Topics   Smoking status: Never Smoker    Smokeless tobacco: Never Used    Alcohol use No    Drug use: No    Sexual activity: Not on file     Other Topics Concern    Caffeine Concern Yes    Comment: soda-1 cup/day     Social History Narr Constitutional: She is oriented to person, place, and time. She appears well-developed and well-nourished. obese   HENT:   Head: Normocephalic and atraumatic. Eyes: Conjunctivae are normal. Pupils are equal, round, and reactive to light.  Right eye exhi

## 2018-01-08 RX ORDER — METFORMIN HYDROCHLORIDE 500 MG/1
TABLET, EXTENDED RELEASE ORAL
Qty: 360 TABLET | Refills: 0 | Status: CANCELLED | OUTPATIENT
Start: 2018-01-08

## 2018-01-10 ENCOUNTER — TELEPHONE (OUTPATIENT)
Dept: ORTHOPEDICS CLINIC | Facility: CLINIC | Age: 43
End: 2018-01-10

## 2018-01-10 ENCOUNTER — PATIENT MESSAGE (OUTPATIENT)
Dept: ENDOCRINOLOGY CLINIC | Facility: CLINIC | Age: 43
End: 2018-01-10

## 2018-01-10 DIAGNOSIS — Z79.4 UNCONTROLLED TYPE 2 DIABETES MELLITUS WITH COMPLICATION, WITH LONG-TERM CURRENT USE OF INSULIN (HCC): Primary | ICD-10-CM

## 2018-01-10 DIAGNOSIS — E11.65 UNCONTROLLED TYPE 2 DIABETES MELLITUS WITH COMPLICATION, WITH LONG-TERM CURRENT USE OF INSULIN (HCC): Primary | ICD-10-CM

## 2018-01-10 DIAGNOSIS — E11.8 UNCONTROLLED TYPE 2 DIABETES MELLITUS WITH COMPLICATION, WITH LONG-TERM CURRENT USE OF INSULIN (HCC): Primary | ICD-10-CM

## 2018-01-10 RX ORDER — METFORMIN HYDROCHLORIDE 500 MG/1
TABLET, EXTENDED RELEASE ORAL
Qty: 360 TABLET | Refills: 0 | Status: SHIPPED | OUTPATIENT
Start: 2018-01-10 | End: 2018-04-13

## 2018-01-10 NOTE — TELEPHONE ENCOUNTER
Out patient surgery with Dr. Corazon Bills  Date 1-26-18  Location -[place]  Left carpal tunnel release  [id number]  Diagnosis code G56.02    Review of coverages  [facility]    Referral in system from November 2017  Referral states closed.     Referral processing does

## 2018-01-10 NOTE — TELEPHONE ENCOUNTER
From: Luly Morton  To: Mike Dennis MD  Sent: 1/10/2018 11:56 AM CST  Subject: Prescription Question    I scheduled an appointment for February.  I do need medication refills for the metformin and the insulin, as well as the test strips, which I us

## 2018-01-11 RX ORDER — INSULIN HUMAN 500 [IU]/ML
INJECTION, SOLUTION SUBCUTANEOUS
Qty: 54 ML | Refills: 0 | Status: SHIPPED | OUTPATIENT
Start: 2018-01-11 | End: 2018-02-19

## 2018-01-17 ENCOUNTER — TELEPHONE (OUTPATIENT)
Dept: ORTHOPEDICS CLINIC | Facility: CLINIC | Age: 43
End: 2018-01-17

## 2018-01-17 NOTE — TELEPHONE ENCOUNTER
REferral in system  For surgery   Surgery date 1-26-18  Dr. Sintia Childress  Left carpal tunnel release [id number]  Diagnosis code G56.02    Status of referral pending  Tasking to [facility]

## 2018-01-23 ENCOUNTER — TELEPHONE (OUTPATIENT)
Dept: ORTHOPEDICS CLINIC | Facility: CLINIC | Age: 43
End: 2018-01-23

## 2018-01-23 NOTE — TELEPHONE ENCOUNTER
Referral in system on 1-17-18 for surgery   Dr. Funmilayo Marr  Surgery date 1-26-18  REferral status was pending    REview for update   Now no referral in system    Tasking to managed care dept

## 2018-01-26 ENCOUNTER — ANESTHESIA (OUTPATIENT)
Dept: SURGERY | Facility: HOSPITAL | Age: 43
End: 2018-01-26
Payer: COMMERCIAL

## 2018-01-26 ENCOUNTER — SURGERY (OUTPATIENT)
Age: 43
End: 2018-01-26

## 2018-01-26 ENCOUNTER — HOSPITAL ENCOUNTER (OUTPATIENT)
Facility: HOSPITAL | Age: 43
Setting detail: HOSPITAL OUTPATIENT SURGERY
Discharge: HOME | End: 2018-01-26
Attending: ORTHOPAEDIC SURGERY | Admitting: ORTHOPAEDIC SURGERY
Payer: COMMERCIAL

## 2018-01-26 ENCOUNTER — ANESTHESIA EVENT (OUTPATIENT)
Dept: SURGERY | Facility: HOSPITAL | Age: 43
End: 2018-01-26
Payer: COMMERCIAL

## 2018-01-26 VITALS
OXYGEN SATURATION: 97 % | DIASTOLIC BLOOD PRESSURE: 58 MMHG | RESPIRATION RATE: 16 BRPM | SYSTOLIC BLOOD PRESSURE: 95 MMHG | HEIGHT: 63 IN | BODY MASS INDEX: 44.51 KG/M2 | TEMPERATURE: 98 F | WEIGHT: 251.19 LBS | HEART RATE: 79 BPM

## 2018-01-26 DIAGNOSIS — G56.02 LEFT CARPAL TUNNEL SYNDROME: Primary | ICD-10-CM

## 2018-01-26 LAB
B-HCG UR QL: NEGATIVE
GLUCOSE BLDC GLUCOMTR-MCNC: 83 MG/DL (ref 70–99)

## 2018-01-26 PROCEDURE — 82962 GLUCOSE BLOOD TEST: CPT

## 2018-01-26 PROCEDURE — 81025 URINE PREGNANCY TEST: CPT

## 2018-01-26 PROCEDURE — 01N50ZZ RELEASE MEDIAN NERVE, OPEN APPROACH: ICD-10-PCS | Performed by: ORTHOPAEDIC SURGERY

## 2018-01-26 RX ORDER — HYDROMORPHONE HYDROCHLORIDE 1 MG/ML
0.6 INJECTION, SOLUTION INTRAMUSCULAR; INTRAVENOUS; SUBCUTANEOUS EVERY 5 MIN PRN
Status: DISCONTINUED | OUTPATIENT
Start: 2018-01-26 | End: 2018-01-26

## 2018-01-26 RX ORDER — ONDANSETRON 2 MG/ML
4 INJECTION INTRAMUSCULAR; INTRAVENOUS ONCE AS NEEDED
Status: DISCONTINUED | OUTPATIENT
Start: 2018-01-26 | End: 2018-01-26

## 2018-01-26 RX ORDER — LIDOCAINE HYDROCHLORIDE 10 MG/ML
INJECTION, SOLUTION EPIDURAL; INFILTRATION; INTRACAUDAL; PERINEURAL AS NEEDED
Status: DISCONTINUED | OUTPATIENT
Start: 2018-01-26 | End: 2018-01-26 | Stop reason: SURG

## 2018-01-26 RX ORDER — SODIUM CHLORIDE, SODIUM LACTATE, POTASSIUM CHLORIDE, CALCIUM CHLORIDE 600; 310; 30; 20 MG/100ML; MG/100ML; MG/100ML; MG/100ML
INJECTION, SOLUTION INTRAVENOUS CONTINUOUS
Status: DISCONTINUED | OUTPATIENT
Start: 2018-01-26 | End: 2018-01-26

## 2018-01-26 RX ORDER — CEFAZOLIN SODIUM/WATER 2 G/20 ML
SYRINGE (ML) INTRAVENOUS AS NEEDED
Status: DISCONTINUED | OUTPATIENT
Start: 2018-01-26 | End: 2018-01-26 | Stop reason: SURG

## 2018-01-26 RX ORDER — MIDAZOLAM HYDROCHLORIDE 1 MG/ML
INJECTION INTRAMUSCULAR; INTRAVENOUS AS NEEDED
Status: DISCONTINUED | OUTPATIENT
Start: 2018-01-26 | End: 2018-01-26 | Stop reason: SURG

## 2018-01-26 RX ORDER — HYDROCODONE BITARTRATE AND ACETAMINOPHEN 5; 325 MG/1; MG/1
1-2 TABLET ORAL EVERY 6 HOURS PRN
Qty: 20 TABLET | Refills: 0 | Status: SHIPPED | OUTPATIENT
Start: 2018-01-26 | End: 2018-06-02 | Stop reason: ALTCHOICE

## 2018-01-26 RX ORDER — FAMOTIDINE 20 MG/1
20 TABLET ORAL ONCE
Status: COMPLETED | OUTPATIENT
Start: 2018-01-26 | End: 2018-01-26

## 2018-01-26 RX ORDER — HYDROMORPHONE HYDROCHLORIDE 1 MG/ML
0.4 INJECTION, SOLUTION INTRAMUSCULAR; INTRAVENOUS; SUBCUTANEOUS EVERY 5 MIN PRN
Status: DISCONTINUED | OUTPATIENT
Start: 2018-01-26 | End: 2018-01-26

## 2018-01-26 RX ORDER — HYDROMORPHONE HYDROCHLORIDE 1 MG/ML
0.2 INJECTION, SOLUTION INTRAMUSCULAR; INTRAVENOUS; SUBCUTANEOUS EVERY 5 MIN PRN
Status: DISCONTINUED | OUTPATIENT
Start: 2018-01-26 | End: 2018-01-26

## 2018-01-26 RX ORDER — CEFAZOLIN SODIUM/WATER 2 G/20 ML
2 SYRINGE (ML) INTRAVENOUS ONCE
Status: DISCONTINUED | OUTPATIENT
Start: 2018-01-26 | End: 2018-01-26 | Stop reason: HOSPADM

## 2018-01-26 RX ORDER — HYDROCODONE BITARTRATE AND ACETAMINOPHEN 5; 325 MG/1; MG/1
1 TABLET ORAL AS NEEDED
Status: DISCONTINUED | OUTPATIENT
Start: 2018-01-26 | End: 2018-01-26

## 2018-01-26 RX ORDER — HALOPERIDOL 5 MG/ML
0.25 INJECTION INTRAMUSCULAR ONCE AS NEEDED
Status: DISCONTINUED | OUTPATIENT
Start: 2018-01-26 | End: 2018-01-26

## 2018-01-26 RX ORDER — MORPHINE SULFATE 10 MG/ML
6 INJECTION, SOLUTION INTRAMUSCULAR; INTRAVENOUS EVERY 10 MIN PRN
Status: DISCONTINUED | OUTPATIENT
Start: 2018-01-26 | End: 2018-01-26

## 2018-01-26 RX ORDER — HYDROCODONE BITARTRATE AND ACETAMINOPHEN 5; 325 MG/1; MG/1
2 TABLET ORAL AS NEEDED
Status: DISCONTINUED | OUTPATIENT
Start: 2018-01-26 | End: 2018-01-26

## 2018-01-26 RX ORDER — DEXTROSE MONOHYDRATE 25 G/50ML
50 INJECTION, SOLUTION INTRAVENOUS
Status: DISCONTINUED | OUTPATIENT
Start: 2018-01-26 | End: 2018-01-26

## 2018-01-26 RX ORDER — NALOXONE HYDROCHLORIDE 0.4 MG/ML
80 INJECTION, SOLUTION INTRAMUSCULAR; INTRAVENOUS; SUBCUTANEOUS AS NEEDED
Status: DISCONTINUED | OUTPATIENT
Start: 2018-01-26 | End: 2018-01-26

## 2018-01-26 RX ORDER — MORPHINE SULFATE 2 MG/ML
2 INJECTION, SOLUTION INTRAMUSCULAR; INTRAVENOUS EVERY 10 MIN PRN
Status: DISCONTINUED | OUTPATIENT
Start: 2018-01-26 | End: 2018-01-26

## 2018-01-26 RX ORDER — METOCLOPRAMIDE 10 MG/1
10 TABLET ORAL ONCE
Status: COMPLETED | OUTPATIENT
Start: 2018-01-26 | End: 2018-01-26

## 2018-01-26 RX ORDER — ACETAMINOPHEN 500 MG
1000 TABLET ORAL ONCE
Status: COMPLETED | OUTPATIENT
Start: 2018-01-26 | End: 2018-01-26

## 2018-01-26 RX ORDER — MORPHINE SULFATE 4 MG/ML
4 INJECTION, SOLUTION INTRAMUSCULAR; INTRAVENOUS EVERY 10 MIN PRN
Status: DISCONTINUED | OUTPATIENT
Start: 2018-01-26 | End: 2018-01-26

## 2018-01-26 RX ADMIN — SODIUM CHLORIDE, SODIUM LACTATE, POTASSIUM CHLORIDE, CALCIUM CHLORIDE: 600; 310; 30; 20 INJECTION, SOLUTION INTRAVENOUS at 07:42:00

## 2018-01-26 RX ADMIN — CEFAZOLIN SODIUM/WATER 2 G: 2 G/20 ML SYRINGE (ML) INTRAVENOUS at 07:47:00

## 2018-01-26 RX ADMIN — SODIUM CHLORIDE, SODIUM LACTATE, POTASSIUM CHLORIDE, CALCIUM CHLORIDE: 600; 310; 30; 20 INJECTION, SOLUTION INTRAVENOUS at 08:07:00

## 2018-01-26 RX ADMIN — LIDOCAINE HYDROCHLORIDE 50 MG: 10 INJECTION, SOLUTION EPIDURAL; INFILTRATION; INTRACAUDAL; PERINEURAL at 07:46:00

## 2018-01-26 RX ADMIN — MIDAZOLAM HYDROCHLORIDE 2 MG: 1 INJECTION INTRAMUSCULAR; INTRAVENOUS at 07:42:00

## 2018-01-26 RX ADMIN — SODIUM CHLORIDE, SODIUM LACTATE, POTASSIUM CHLORIDE, CALCIUM CHLORIDE: 600; 310; 30; 20 INJECTION, SOLUTION INTRAVENOUS at 07:39:00

## 2018-01-26 NOTE — BRIEF OP NOTE
Pre-Operative Diagnosis: left carpal tunnel     Post-Operative Diagnosis: left carpal tunnel     Procedure Performed:   Procedure(s):  left carpal tunnel release    Surgeon(s) and Role:     Ivette Cuevas MD - Primary    Assistant(s):   none     [place]

## 2018-01-26 NOTE — ANESTHESIA POSTPROCEDURE EVALUATION
Patient: Dulce Maria Nurse    Procedure Summary     Date:  01/26/18 Room / Location:  [address] [place] [address] MAIN OR 11 / [address] [place] [address] MAIN OR    Anesthesia Start:  4954 Anesthesia Stop:  0224    Procedure:  WRIST CARPAL TUNNEL RELEASE (Left Hand) Diagnosis:  (left carpal tunnel)    [place]

## 2018-01-26 NOTE — OPERATIVE REPORT
[facility]    PATIENT'S NAME: Fernando Juarez   ATTENDING PHYSICIAN: Isaac Baldwin MD   OPERATING PHYSICIAN: Isaac Baldwin MD   PATIENT ACCOUNT#:   [id number]    LOCATION:  [facility] [address] PACU [address] [place] [address]  MEDICAL RECORD #:   [id number] given Ancef as antibiotic prophylaxis within 1 hour of incision time. Her left upper extremity was prepped and draped in a sterile fashion. A tourniquet was inflated to 250 mmHg following Esmarch exsanguination.   A longitudinal incision was made beginnin

## 2018-01-26 NOTE — H&P
Adriana Sepulveda  7/16/1975  40year old   female  Sergio Gonzalez MD     HPI:   Patient presents with:   Follow - Up: left elbow pain- pt states she thinks the pain is getting worse and pain in left middle & ring fingers.         The patient complains of p for this visit. The patient is awake and oriented x 3 and overall well appearing. The patient has normal mood. The patient is non-tender and atraumatic with the exception of their left upper extremity.   The patient's skin is intact and compartments are the future, but declines a second procedure at this time.

## 2018-01-26 NOTE — ANESTHESIA PREPROCEDURE EVALUATION
Anesthesia PreOp Note    HPI:     Mayra Rodriguez is a 40year old female who presents for preoperative consultation requested by: Belkis Martinez MD    Date of Surgery: 1/26/2018    Procedure(s):  WRIST CARPAL TUNNEL RELEASE  Indication: left carpal herb RIGHT  1980: TONSILLECTOMY      Prescriptions Prior to Admission:  HUMULIN R U-500 KWIKPEN 500 UNIT/ML Subcutaneous Solution Pen-injector TAKE 75 UNITS WITH BREAKFAST AND 90 WITH LUNCH AND DINNER.  Disp: 54 mL Rfl: 0 1/25/2018 at 1800   MetFORMIN HCl  education: N/A  Number of children: 1     Occupational History  speech therapist       Social History Main Topics   Smoking status: Never Smoker    Smokeless tobacco: Never Used    Alcohol use No    Drug use: No    Sexual activity: Not on file     Other To the anesthetic plan, benefits, risks, major complications, and any alternative forms of anesthetic management. All of the patient's questions were answered to the best of my ability. The patient desires the anesthetic management as planned.   DONOVAN HARMON

## 2018-01-26 NOTE — ADDENDUM NOTE
Addendum  created 01/26/18 4329 by Venkatesh Floyd CRNA    Anesthesia Intra Meds edited, Orders acknowledged in Narrator

## 2018-02-06 RX ORDER — ATORVASTATIN CALCIUM 40 MG/1
TABLET, FILM COATED ORAL
Qty: 90 TABLET | Refills: 3 | Status: SHIPPED | OUTPATIENT
Start: 2018-02-06 | End: 2019-02-04

## 2018-02-06 RX ORDER — LISINOPRIL 5 MG/1
5 TABLET ORAL
Qty: 90 TABLET | Refills: 3 | Status: SHIPPED | OUTPATIENT
Start: 2018-02-06 | End: 2019-02-04

## 2018-02-06 NOTE — TELEPHONE ENCOUNTER
Refill protocol failed because the patient did not meet the protocol criteria.  Please advise in regards to refill request     Hypertensive Medications  Protocol Criteria:  · Appointment scheduled in the past 6 months or in the next 3 months  · BMP or CMP i AGRATIO 1.2 07/10/2015   ANIONGAP 9 07/10/2015   OSMOCALC 293 07/10/2015     Cholesterol Medications  Protocol Criteria:  · Appointment scheduled in the past 12 months or in the next 3 months  · ALT & LDL on file in the past 12 months  · ALT result < 80

## 2018-02-08 ENCOUNTER — OFFICE VISIT (OUTPATIENT)
Dept: ORTHOPEDICS CLINIC | Facility: CLINIC | Age: 43
End: 2018-02-08

## 2018-02-08 DIAGNOSIS — Z47.89 AFTERCARE FOLLOWING SURGERY OF THE MUSCULOSKELETAL SYSTEM: Primary | ICD-10-CM

## 2018-02-08 PROCEDURE — 99212 OFFICE O/P EST SF 10 MIN: CPT | Performed by: ORTHOPAEDIC SURGERY

## 2018-02-08 PROCEDURE — 99024 POSTOP FOLLOW-UP VISIT: CPT | Performed by: ORTHOPAEDIC SURGERY

## 2018-02-08 NOTE — PROGRESS NOTES
2/8/2018  Amanda Diaz  7/16/1975  40year old   female  Devi Aj MD    HPI:   Patient presents with:  Post-Op: Left CTR 1st visit - had sx on 1/26/18 - states the swelling is gone, has pain sometimes rated as 3/10 , still has some numbness and

## 2018-02-12 ENCOUNTER — LAB REQUISITION (OUTPATIENT)
Dept: LAB | Facility: HOSPITAL | Age: 43
End: 2018-02-12
Payer: COMMERCIAL

## 2018-02-12 ENCOUNTER — TELEPHONE (OUTPATIENT)
Dept: ORTHOPEDICS CLINIC | Facility: CLINIC | Age: 43
End: 2018-02-12

## 2018-02-12 DIAGNOSIS — Z01.419 ENCOUNTER FOR GYNECOLOGICAL EXAMINATION WITHOUT ABNORMAL FINDING: ICD-10-CM

## 2018-02-12 DIAGNOSIS — Z11.51 ENCOUNTER FOR SCREENING FOR HUMAN PAPILLOMAVIRUS (HPV): ICD-10-CM

## 2018-02-12 PROCEDURE — 87624 HPV HI-RISK TYP POOLED RSLT: CPT | Performed by: OBSTETRICS & GYNECOLOGY

## 2018-02-12 PROCEDURE — 88175 CYTOPATH C/V AUTO FLUID REDO: CPT | Performed by: OBSTETRICS & GYNECOLOGY

## 2018-02-12 NOTE — TELEPHONE ENCOUNTER
Some postop swelling is normal  If no redness or drainage unlikely infection  May try massage   Can order therapy as needed  If it gets worse, come into clinic

## 2018-02-12 NOTE — TELEPHONE ENCOUNTER
Pt LOV 2/8/18. Pt states since stiches have been removed she has developed a soft lump on her left wrist. Please advise. Thank you.

## 2018-02-12 NOTE — TELEPHONE ENCOUNTER
Pt s/p L CTR 1/26/18. Pt had PO appt on 2/8/18. Pt states on 2/10/18 she noticed a quarter size raised swelling a couple inches proximal from the incision. She denies any injury. She denies any redness, warmth, fever/chills, or pain.  She states it is slig

## 2018-02-13 LAB — HPV I/H RISK 1 DNA SPEC QL NAA+PROBE: NEGATIVE

## 2018-02-19 ENCOUNTER — TELEPHONE (OUTPATIENT)
Dept: ORTHOPEDICS CLINIC | Facility: CLINIC | Age: 43
End: 2018-02-19

## 2018-02-19 ENCOUNTER — OFFICE VISIT (OUTPATIENT)
Dept: ENDOCRINOLOGY CLINIC | Facility: CLINIC | Age: 43
End: 2018-02-19

## 2018-02-19 VITALS
SYSTOLIC BLOOD PRESSURE: 132 MMHG | HEART RATE: 120 BPM | HEIGHT: 64 IN | BODY MASS INDEX: 42.17 KG/M2 | DIASTOLIC BLOOD PRESSURE: 74 MMHG | WEIGHT: 247 LBS

## 2018-02-19 DIAGNOSIS — E78.5 DYSLIPIDEMIA: ICD-10-CM

## 2018-02-19 DIAGNOSIS — E11.65 UNCONTROLLED TYPE 2 DIABETES MELLITUS WITH HYPERGLYCEMIA, WITH LONG-TERM CURRENT USE OF INSULIN (HCC): Primary | ICD-10-CM

## 2018-02-19 DIAGNOSIS — Z79.4 UNCONTROLLED TYPE 2 DIABETES MELLITUS WITH HYPERGLYCEMIA, WITH LONG-TERM CURRENT USE OF INSULIN (HCC): Primary | ICD-10-CM

## 2018-02-19 LAB
CARTRIDGE LOT#: ABNORMAL NUMERIC
GLUCOSE BLOOD: 174
HEMOGLOBIN A1C: 9.2 % (ref 4.3–5.6)
TEST STRIP LOT #: NORMAL NUMERIC

## 2018-02-19 PROCEDURE — 99212 OFFICE O/P EST SF 10 MIN: CPT | Performed by: INTERNAL MEDICINE

## 2018-02-19 PROCEDURE — 36416 COLLJ CAPILLARY BLOOD SPEC: CPT | Performed by: INTERNAL MEDICINE

## 2018-02-19 PROCEDURE — 99214 OFFICE O/P EST MOD 30 MIN: CPT | Performed by: INTERNAL MEDICINE

## 2018-02-19 PROCEDURE — 83036 HEMOGLOBIN GLYCOSYLATED A1C: CPT | Performed by: INTERNAL MEDICINE

## 2018-02-19 PROCEDURE — 82962 GLUCOSE BLOOD TEST: CPT | Performed by: INTERNAL MEDICINE

## 2018-02-19 NOTE — TELEPHONE ENCOUNTER
pt called. She is post op 1/26/18 with Eleanor Yip. Her wrist still hurts. She has tried icing and massage. No improvement. Please advise. Thank you.

## 2018-02-19 NOTE — TELEPHONE ENCOUNTER
S/w pt and she states she tried doing the massage and icing on left wrist swelling that JL recommended and it hasn't helped. She states the quarter size raised area proximal of the incision is still present and she has pain when moving fingers 2/10.  No red

## 2018-02-19 NOTE — PROGRESS NOTES
Return Office Visit - Diabetes    CHIEF COMPLAINT:    Type 2 DM   Dyslipidemia    HISTORY OF PRESENT ILLNESS:   Arun Bailon is a 40year old female who presents for follow up for diabetes. .    DM HISTORY  Was diagnosed with PCOS at age 22.  She concei times a day.  Disp: 90 each Rfl: 0   Insulin Syringe-Needle U-100 31G X 5/16\" 0.5 ML Does not apply Misc Inject three times daily Disp: 100 each Rfl: 0   Blood Glucose Monitoring Suppl ([employer] CONTOUR MONITOR) W/DEVICE Does not apply Kit by Does not apply r thyroid symmetric, not enlarged and no tenderness  LUNGS: clear to auscultation bilaterally, no crackles or wheezing  CARDIOVASCULAR:  regular rate and rhythm, normal S1 and S2  ABDOMEN:  normal bowel sounds, soft, non-distended, non-tender,   SKIN:  no br today.  e). BG log maintainence explained in great detail (she will check Bg before meals and at bedtime), to get log and glucometer on next visit. f).  Life style changes: Diet: low carbohydrate diet has been discussed, Exercise: should target a weight lo

## 2018-02-20 NOTE — TELEPHONE ENCOUNTER
Pt returning call. Pt gives permission detailed message on vm. If not please call pt tomorrow morning she will be at work the rest of the evening.

## 2018-02-21 NOTE — TELEPHONE ENCOUNTER
Call back and spoke to Carlos Jaeger. Discussed the need to come in to see Dr Virginia Meier and not another physician since it was she who did surgery. 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    Given joshua

## 2018-02-26 ENCOUNTER — TELEPHONE (OUTPATIENT)
Dept: ENDOCRINOLOGY CLINIC | Facility: CLINIC | Age: 43
End: 2018-02-26

## 2018-02-26 NOTE — TELEPHONE ENCOUNTER
Pt states AM had prescribed new meds and wondering if there are any coupons she can use. If so please call. Thankyou.

## 2018-02-26 NOTE — TELEPHONE ENCOUNTER
Per chart Humulin R U-500 and Bydureon have recently been prescribed. [facility] so should be able to use both vouchers and copay cards. Called the patient. She would like coupons mailed to her. Mailed today.

## 2018-02-27 ENCOUNTER — OFFICE VISIT (OUTPATIENT)
Dept: ORTHOPEDICS CLINIC | Facility: CLINIC | Age: 43
End: 2018-02-27

## 2018-02-27 DIAGNOSIS — Z47.89 AFTERCARE FOLLOWING SURGERY OF THE MUSCULOSKELETAL SYSTEM: Primary | ICD-10-CM

## 2018-02-27 PROCEDURE — 99212 OFFICE O/P EST SF 10 MIN: CPT | Performed by: ORTHOPAEDIC SURGERY

## 2018-02-27 PROCEDURE — 99024 POSTOP FOLLOW-UP VISIT: CPT | Performed by: ORTHOPAEDIC SURGERY

## 2018-02-27 NOTE — PROGRESS NOTES
2/27/2018  Anila Ba  7/16/1975  40year old   female  Comfort Ramey MD    HPI:   Patient presents with:  Post-Op: s/p left CTR- pt states a couple days after taking out her sutures she developed a swelling in the wrist. She has tried icing and ib

## 2018-03-04 ENCOUNTER — HOSPITAL ENCOUNTER (OUTPATIENT)
Dept: MAMMOGRAPHY | Facility: HOSPITAL | Age: 43
Discharge: HOME | End: 2018-03-04
Attending: OBSTETRICS & GYNECOLOGY
Payer: COMMERCIAL

## 2018-03-04 DIAGNOSIS — Z12.39 ENCOUNTER FOR SCREENING FOR MALIGNANT NEOPLASM OF BREAST: ICD-10-CM

## 2018-03-04 PROCEDURE — 77067 SCR MAMMO BI INCL CAD: CPT | Performed by: OBSTETRICS & GYNECOLOGY

## 2018-03-14 ENCOUNTER — OFFICE VISIT (OUTPATIENT)
Dept: OCCUPATIONAL MEDICINE | Facility: HOSPITAL | Age: 43
End: 2018-03-14
Attending: ORTHOPAEDIC SURGERY
Payer: COMMERCIAL

## 2018-03-14 DIAGNOSIS — Z47.89 AFTERCARE FOLLOWING SURGERY OF THE MUSCULOSKELETAL SYSTEM: ICD-10-CM

## 2018-03-14 PROCEDURE — 97165 OT EVAL LOW COMPLEX 30 MIN: CPT | Performed by: OCCUPATIONAL THERAPIST

## 2018-03-14 PROCEDURE — 97530 THERAPEUTIC ACTIVITIES: CPT | Performed by: OCCUPATIONAL THERAPIST

## 2018-03-14 NOTE — PROGRESS NOTES
OCCUPATIONAL THERAPY UPPER EXTREMITY EVALUATION:   Referring Physician: Dr. East  Date of onset: Jan.25, 2018  Diagnosis: Left CTR Date of Service: 3/14/2018  Date of Surgery: 01/26/18     PATIENT SUMMARY:   Viki Rios is a 40year old y/o female difficulty opening jars, squeezing toothpaste, driving, and playing harp.  Given the above noted deficits in ROM, pain,edema,sensation and strength, patient presents with impairments in occupation- based task performance for self care skills, and work skill HEP to maintain progress achieved in OT. Patient will demonstrate increase in left wrist flexion to at least 80 degrees and extension to 75 degrees for ease in playing harp. Patient will demonstrate decrease in left wrist circumference by 0.5 cm.     Rea

## 2018-03-16 ENCOUNTER — OFFICE VISIT (OUTPATIENT)
Dept: OCCUPATIONAL MEDICINE | Facility: HOSPITAL | Age: 43
End: 2018-03-16
Attending: ORTHOPAEDIC SURGERY
Payer: COMMERCIAL

## 2018-03-16 DIAGNOSIS — Z47.89 AFTERCARE FOLLOWING SURGERY OF THE MUSCULOSKELETAL SYSTEM: ICD-10-CM

## 2018-03-16 PROCEDURE — 97140 MANUAL THERAPY 1/> REGIONS: CPT | Performed by: OCCUPATIONAL THERAPIST

## 2018-03-16 PROCEDURE — 97035 APP MDLTY 1+ULTRASOUND EA 15: CPT | Performed by: OCCUPATIONAL THERAPIST

## 2018-03-16 PROCEDURE — 97110 THERAPEUTIC EXERCISES: CPT | Performed by: OCCUPATIONAL THERAPIST

## 2018-03-16 NOTE — PROGRESS NOTES
Diagnosis:  Left CTR  Authorized # of Visits:  12       Next MD visit: none scheduled  Fall Risk: standard         Precautions: diabetes, high blood pressure, right shoulder tendonitis     Medication Changes since last visit?: No  Subjective: \"I've been min  Total Treatment Time: 50 min

## 2018-03-19 ENCOUNTER — APPOINTMENT (OUTPATIENT)
Dept: OCCUPATIONAL MEDICINE | Facility: HOSPITAL | Age: 43
End: 2018-03-19
Attending: ORTHOPAEDIC SURGERY
Payer: COMMERCIAL

## 2018-03-21 ENCOUNTER — OFFICE VISIT (OUTPATIENT)
Dept: OCCUPATIONAL MEDICINE | Facility: HOSPITAL | Age: 43
End: 2018-03-21
Attending: ORTHOPAEDIC SURGERY
Payer: COMMERCIAL

## 2018-03-21 DIAGNOSIS — Z47.89 AFTERCARE FOLLOWING SURGERY OF THE MUSCULOSKELETAL SYSTEM: ICD-10-CM

## 2018-03-21 PROCEDURE — 97140 MANUAL THERAPY 1/> REGIONS: CPT

## 2018-03-21 PROCEDURE — 97110 THERAPEUTIC EXERCISES: CPT

## 2018-03-21 PROCEDURE — 97035 APP MDLTY 1+ULTRASOUND EA 15: CPT

## 2018-03-21 NOTE — PROGRESS NOTES
Diagnosis:  Left CTR  Authorized # of Visits:  12       Next MD visit: none scheduled  Fall Risk: standard         Precautions: diabetes, high blood pressure, right shoulder tendonitis     Medication Changes since last visit?: No  Subjective: \"My pain is towards)  Pt will be independent and compliant with comprehensive HEP to maintain progress achieved in OT. Johan Lanza .(on going)  Patient will demonstrate increase in left wrist flexion to at least 80 degrees and extension to 75 degrees for ease in playing harp.   Pa

## 2018-03-23 ENCOUNTER — OFFICE VISIT (OUTPATIENT)
Dept: OCCUPATIONAL MEDICINE | Facility: HOSPITAL | Age: 43
End: 2018-03-23
Attending: ORTHOPAEDIC SURGERY
Payer: COMMERCIAL

## 2018-03-23 PROCEDURE — 97140 MANUAL THERAPY 1/> REGIONS: CPT

## 2018-03-23 PROCEDURE — 97110 THERAPEUTIC EXERCISES: CPT

## 2018-03-23 NOTE — PROGRESS NOTES
Diagnosis:  Left CTR  Authorized # of Visits:  12       Next MD visit: none scheduled  Fall Risk: standard         Precautions: diabetes, high blood pressure, right shoulder tendonitis     Medication Changes since last visit?: No  Subjective: \"My hands is Assessment: Patient arrived 15 minutes late to therapy today. Tolerated activities well. Patient stated the tingling sensation in finger has went away. Reported no increase in pain post session, just fatigue.      Goals:   Pt will present

## 2018-03-26 ENCOUNTER — OFFICE VISIT (OUTPATIENT)
Dept: OCCUPATIONAL MEDICINE | Facility: HOSPITAL | Age: 43
End: 2018-03-26
Attending: ORTHOPAEDIC SURGERY
Payer: COMMERCIAL

## 2018-03-26 DIAGNOSIS — Z47.89 AFTERCARE FOLLOWING SURGERY OF THE MUSCULOSKELETAL SYSTEM: ICD-10-CM

## 2018-03-26 PROCEDURE — 97140 MANUAL THERAPY 1/> REGIONS: CPT

## 2018-03-26 PROCEDURE — 97110 THERAPEUTIC EXERCISES: CPT

## 2018-03-26 PROCEDURE — 97035 APP MDLTY 1+ULTRASOUND EA 15: CPT

## 2018-03-26 NOTE — PROGRESS NOTES
Diagnosis:  Left CTR  Authorized # of Visits:  12       Next MD visit: none scheduled  Fall Risk: standard         Precautions: diabetes, high blood pressure, right shoulder tendonitis     Medication Changes since last visit?: No  Subjective: \"I'm using m and digit extension rings - 5 min Red putty , roll, twist, pull, two point pinches   Red putty, FDS/FDP  roll, twist, pull, two point pinches, extension rings CP activity, Green, Red and Yellow, x20      Ultrasound 20%, 3.0 MHZ, 1. w/cm2 8 min over vo

## 2018-03-28 ENCOUNTER — OFFICE VISIT (OUTPATIENT)
Dept: OCCUPATIONAL MEDICINE | Facility: HOSPITAL | Age: 43
End: 2018-03-28
Attending: ORTHOPAEDIC SURGERY
Payer: COMMERCIAL

## 2018-03-28 DIAGNOSIS — Z47.89 AFTERCARE FOLLOWING SURGERY OF THE MUSCULOSKELETAL SYSTEM: ICD-10-CM

## 2018-03-28 PROCEDURE — 97110 THERAPEUTIC EXERCISES: CPT

## 2018-03-28 PROCEDURE — 97140 MANUAL THERAPY 1/> REGIONS: CPT

## 2018-03-28 NOTE — PROGRESS NOTES
Diagnosis:  Left CTR  Authorized # of Visits:  12       Next MD visit: none scheduled  Fall Risk: standard         Precautions: diabetes, high blood pressure, right shoulder tendonitis     Medication Changes since last visit?: No  Subjective: \"I still hav Wrist PROM of wrist into flexion/extension Wrist PROM of wrist into flexion/extension     Tendon glides x10 Tendon glides x8    [place] pickup, digit opposition D1-D5 x20 [place] pickup, digit opposition D1-D5 x20,    [place] gathering x3 (10 marbles)       R progress towards)    Plan: Continue to focus on pain management and promoting AROM for functional grasping.     Charges: MTTE2 Total Timed Treatment: 45 min  Total Treatment Time: 45 min

## 2018-04-02 ENCOUNTER — APPOINTMENT (OUTPATIENT)
Dept: OCCUPATIONAL MEDICINE | Facility: HOSPITAL | Age: 43
End: 2018-04-02
Attending: ORTHOPAEDIC SURGERY
Payer: COMMERCIAL

## 2018-04-04 ENCOUNTER — OFFICE VISIT (OUTPATIENT)
Dept: OCCUPATIONAL MEDICINE | Facility: HOSPITAL | Age: 43
End: 2018-04-04
Attending: ORTHOPAEDIC SURGERY
Payer: COMMERCIAL

## 2018-04-04 DIAGNOSIS — Z47.89 AFTERCARE FOLLOWING SURGERY OF THE MUSCULOSKELETAL SYSTEM: ICD-10-CM

## 2018-04-04 PROCEDURE — 97140 MANUAL THERAPY 1/> REGIONS: CPT

## 2018-04-04 PROCEDURE — 97110 THERAPEUTIC EXERCISES: CPT

## 2018-04-04 NOTE — PROGRESS NOTES
Patient Name: Ace Houston, : 1975, MRN: [id number]   Date:  2018  Referring Physician:  Shanthi Chi    Diagnosis: L CTR      Discharge Summary    Pt has attended 7, cancelled 1, and no shown 0 visits in Occupational Therapy.      Murtaza in left wrist circumference by 0.5 cm. .(not achieved)      Charges: MTTE2 Total Timed Treatment: 45 min  Total Treatment Time: 45 min    FOTO: 67/100  Current status G Code: Discharge, Carrying, Moving and Handling Objects, CJ: 20-39% impaired, limited, or

## 2018-04-04 NOTE — PROGRESS NOTES
Patient Name: Angel Bautista, : 1975, MRN: [id number]   Date:  2018  Referring Physician:  Joann Paula    Diagnosis: L CTR      Discharge Summary    Pt has attended 7, cancelled 1, and no shown 0 visits in Occupational Therapy.      Murtaza in left wrist circumference by 0.5 cm. .(not achieved)      Charges: MTTE2 Total Timed Treatment: 45 min  Total Treatment Time: 45 min    FOTO: 67/100  Current status G Code: Discharge, Carrying, Moving and Handling Objects, CJ: 20-39% impaired, limited, or

## 2018-04-16 RX ORDER — METFORMIN HYDROCHLORIDE 500 MG/1
TABLET, EXTENDED RELEASE ORAL
Qty: 360 TABLET | Refills: 0 | Status: SHIPPED | OUTPATIENT
Start: 2018-04-16 | End: 2018-07-18

## 2018-05-31 ENCOUNTER — NURSE TRIAGE (OUTPATIENT)
Dept: FAMILY MEDICINE CLINIC | Facility: CLINIC | Age: 43
End: 2018-05-31

## 2018-05-31 RX ORDER — BETAMETHASONE DIPROPIONATE 0.5 MG/G
CREAM TOPICAL
Qty: 45 G | Refills: 0 | Status: SHIPPED | OUTPATIENT
Start: 2018-05-31 | End: 2019-09-04

## 2018-05-31 RX ORDER — LEVOCETIRIZINE DIHYDROCHLORIDE 5 MG/1
5 TABLET, FILM COATED ORAL EVERY EVENING
Qty: 30 TABLET | Refills: 0 | Status: SHIPPED | OUTPATIENT
Start: 2018-05-31 | End: 2018-09-12 | Stop reason: ALTCHOICE

## 2018-05-31 NOTE — TELEPHONE ENCOUNTER
Action Requested: Summary for Provider     []  Critical Lab, Recommendations Needed  [x] Need Additional Advice  []   FYI    []   Need Orders  [] Need Medications Sent to Pharmacy  []  Other     SUMMARY:  Appt scheduled for 6/2/18 with AMA in [facility].  Please a

## 2018-05-31 NOTE — TELEPHONE ENCOUNTER
I sent a stronger cream and xyzal to take at night - antihistamine. Also apply aquaphor throughout the day - especially after hand washing.

## 2018-06-02 ENCOUNTER — OFFICE VISIT (OUTPATIENT)
Dept: FAMILY MEDICINE CLINIC | Facility: CLINIC | Age: 43
End: 2018-06-02

## 2018-06-02 VITALS
SYSTOLIC BLOOD PRESSURE: 122 MMHG | TEMPERATURE: 98 F | DIASTOLIC BLOOD PRESSURE: 84 MMHG | HEART RATE: 98 BPM | BODY MASS INDEX: 42.17 KG/M2 | WEIGHT: 247 LBS | HEIGHT: 64 IN

## 2018-06-02 DIAGNOSIS — L30.9 HAND ECZEMA: Primary | ICD-10-CM

## 2018-06-02 PROCEDURE — 99213 OFFICE O/P EST LOW 20 MIN: CPT | Performed by: FAMILY MEDICINE

## 2018-06-02 PROCEDURE — 99212 OFFICE O/P EST SF 10 MIN: CPT | Performed by: FAMILY MEDICINE

## 2018-06-02 NOTE — PROGRESS NOTES
HPI:    Patient ID: Latoya Rousseau is a 40year old female. HPI     Patient presents with:  Eczema: on right hand for about a year       Patient here with complaints of having eczema in right hand especially in fifth digit.   She has had this over the l 0.1 % External Cream Apply topically 2 (two) times daily as needed. Disp: 45 g Rfl: 3   Insulin Pen Needle (CAREFINE PEN NEEDLES) 32G X 4 MM Does not apply Misc Inject three times a day.  Disp: 90 each Rfl: 0   Insulin Syringe-Needle U-100 31G X 5/16\" 0.5

## 2018-06-18 RX ORDER — EXENATIDE 2 MG/.65ML
2 INJECTION, SUSPENSION, EXTENDED RELEASE SUBCUTANEOUS WEEKLY
Qty: 4 EACH | Refills: 0 | Status: SHIPPED | OUTPATIENT
Start: 2018-06-18 | End: 2018-07-16

## 2018-07-09 ENCOUNTER — PATIENT MESSAGE (OUTPATIENT)
Dept: FAMILY MEDICINE CLINIC | Facility: CLINIC | Age: 43
End: 2018-07-09

## 2018-07-09 NOTE — TELEPHONE ENCOUNTER
From: Bryant Mack  To: Aliyah Jones MD  Sent: 7/9/2018 11:15 AM CDT  Subject: Referral Request    I have a f/u appointment with Dr. Anitha Arizmendi and I need a referral (I think I am out of visits). Thank you!

## 2018-07-16 RX ORDER — EXENATIDE 2 MG/.65ML
INJECTION, SUSPENSION, EXTENDED RELEASE SUBCUTANEOUS
Qty: 12 EACH | Refills: 0 | Status: SHIPPED | OUTPATIENT
Start: 2018-07-16 | End: 2018-10-10

## 2018-07-18 RX ORDER — METFORMIN HYDROCHLORIDE 500 MG/1
TABLET, EXTENDED RELEASE ORAL
Qty: 360 TABLET | Refills: 0 | Status: SHIPPED | OUTPATIENT
Start: 2018-07-18 | End: 2018-10-16

## 2018-08-13 RX ORDER — INSULIN HUMAN 500 [IU]/ML
INJECTION, SOLUTION SUBCUTANEOUS
Qty: 18 ML | Refills: 0 | Status: SHIPPED | OUTPATIENT
Start: 2018-08-13 | End: 2018-09-20

## 2018-08-13 NOTE — TELEPHONE ENCOUNTER
LOV 2/19/18. RTC 3 months. No F/U scheduled. Patient cancelled appt 8/10/18. Called the patient and LDM that she needs an appt. 1 month refill pending.

## 2018-09-07 ENCOUNTER — OFFICE VISIT (OUTPATIENT)
Dept: ENDOCRINOLOGY CLINIC | Facility: CLINIC | Age: 43
End: 2018-09-07

## 2018-09-07 VITALS
HEART RATE: 112 BPM | RESPIRATION RATE: 16 BRPM | WEIGHT: 247 LBS | HEIGHT: 64 IN | BODY MASS INDEX: 42.17 KG/M2 | DIASTOLIC BLOOD PRESSURE: 73 MMHG | SYSTOLIC BLOOD PRESSURE: 123 MMHG

## 2018-09-07 DIAGNOSIS — E78.5 DYSLIPIDEMIA: ICD-10-CM

## 2018-09-07 DIAGNOSIS — E11.65 UNCONTROLLED TYPE 2 DIABETES MELLITUS WITH HYPERGLYCEMIA, WITH LONG-TERM CURRENT USE OF INSULIN (HCC): Primary | ICD-10-CM

## 2018-09-07 DIAGNOSIS — Z79.4 UNCONTROLLED TYPE 2 DIABETES MELLITUS WITH HYPERGLYCEMIA, WITH LONG-TERM CURRENT USE OF INSULIN (HCC): Primary | ICD-10-CM

## 2018-09-07 LAB
CARTRIDGE LOT#: ABNORMAL NUMERIC
GLUCOSE BLOOD: 238
HEMOGLOBIN A1C: 8.6 % (ref 4.3–5.6)
TEST STRIP LOT #: NORMAL NUMERIC

## 2018-09-07 PROCEDURE — 83036 HEMOGLOBIN GLYCOSYLATED A1C: CPT | Performed by: INTERNAL MEDICINE

## 2018-09-07 PROCEDURE — 99212 OFFICE O/P EST SF 10 MIN: CPT | Performed by: INTERNAL MEDICINE

## 2018-09-07 PROCEDURE — 99214 OFFICE O/P EST MOD 30 MIN: CPT | Performed by: INTERNAL MEDICINE

## 2018-09-07 PROCEDURE — 36416 COLLJ CAPILLARY BLOOD SPEC: CPT | Performed by: INTERNAL MEDICINE

## 2018-09-07 PROCEDURE — 82962 GLUCOSE BLOOD TEST: CPT | Performed by: INTERNAL MEDICINE

## 2018-09-07 NOTE — PROGRESS NOTES
Return Office Visit - Diabetes    CHIEF COMPLAINT:    Type 2 DM   Dyslipidemia    HISTORY OF PRESENT ILLNESS:   Bryant Mack is a 44year old female who presents for follow up for diabetes. .    DM HISTORY  Was diagnosed with PCOS at age 22.  She concei 0   LISINOPRIL 5 MG Oral Tab TAKE 1 TABLET (5 MG TOTAL) BY MOUTH ONCE DAILY. Disp: 90 tablet Rfl: 3   ATORVASTATIN 40 MG Oral Tab TAKE ONE TABLET BY MOUTH NIGHTLY Disp: 90 tablet Rfl: 3   Glucose Blood In Vitro Strip Check sugars four times a day.  Disp: 30 BMI: Body mass index is 42.4 kg/m².      CONSTITUTIONAL:  awake, alert, cooperative, no apparent distress,  PSYCH: normal affect  EYES:  No proptosis, no ptosis, conjunctiva normal  ENT:  Normocephalic, atraumatic  NECK:  Supple, symmetrical, trachea mi lisinopril. Discussed that better BG control will prevent progression. Repeat Ur MA normal.   c). Instructed on importance of annual eye exams. d). Foot exam: Daily feet exam explained, Monofilament sensation normal today. e).  BG log maintainence explain

## 2018-09-12 ENCOUNTER — OFFICE VISIT (OUTPATIENT)
Dept: INTERNAL MEDICINE CLINIC | Facility: CLINIC | Age: 43
End: 2018-09-12

## 2018-09-12 ENCOUNTER — MED REC SCAN ONLY (OUTPATIENT)
Dept: CASE MANAGEMENT | Age: 43
End: 2018-09-12

## 2018-09-12 VITALS
HEIGHT: 64 IN | WEIGHT: 247 LBS | SYSTOLIC BLOOD PRESSURE: 115 MMHG | DIASTOLIC BLOOD PRESSURE: 79 MMHG | HEART RATE: 99 BPM | BODY MASS INDEX: 42.17 KG/M2

## 2018-09-12 DIAGNOSIS — M76.62 ACHILLES TENDINITIS, LEFT LEG: Primary | ICD-10-CM

## 2018-09-12 PROCEDURE — 99203 OFFICE O/P NEW LOW 30 MIN: CPT | Performed by: INTERNAL MEDICINE

## 2018-09-12 PROCEDURE — 99212 OFFICE O/P EST SF 10 MIN: CPT | Performed by: INTERNAL MEDICINE

## 2018-09-12 RX ORDER — DICLOFENAC SODIUM 75 MG/1
75 TABLET, DELAYED RELEASE ORAL 2 TIMES DAILY
Qty: 28 TABLET | Refills: 0 | Status: SHIPPED | OUTPATIENT
Start: 2018-09-12 | End: 2018-09-26

## 2018-09-12 NOTE — PROGRESS NOTES
Blaise Flannery is a 44year old female. Patient presents with: Ankle Pain: C/o left ankle pain and swelling for about a month with no improvement. HPI:     HPI    Patient is here with Left ankle pain and swelling for 1 month.  She describes pain is Insulin Syringe-Needle U-100 31G X 5/16\" 0.5 ML Does not apply Misc Inject three times daily Disp: 100 each Rfl: 0   Blood Glucose Monitoring Suppl ([employer] CONTOUR MONITOR) W/DEVICE Does not apply Kit by Does not apply route.  Disp:  Rfl:       Past Medical /79 (BP Location: Right arm, Patient Position: Sitting, Cuff Size: large)   Pulse 99   Ht 5' 4\" (1.626 m)   Wt 247 lb (112 kg)   BMI 42.40 kg/m²   Physical Exam   Constitutional: She is oriented to person, place, and time.  She appears well-developed

## 2018-09-20 RX ORDER — INSULIN HUMAN 500 [IU]/ML
INJECTION, SOLUTION SUBCUTANEOUS
Qty: 54 ML | Refills: 0 | Status: SHIPPED | OUTPATIENT
Start: 2018-09-20 | End: 2019-01-07

## 2018-09-24 ENCOUNTER — APPOINTMENT (OUTPATIENT)
Dept: PHYSICAL THERAPY | Facility: HOSPITAL | Age: 43
End: 2018-09-24
Attending: INTERNAL MEDICINE
Payer: COMMERCIAL

## 2018-09-24 NOTE — PROGRESS NOTES
LOWER EXTREMITY EVALUATION:   Referring Physician: Dr. Iker Serrano   Date of Onset: Pain began about 6 wks ago no clear cause Date of Service: 9/24/2018   Diagnosis: Left achilles tendonitis          Insurance : [address] [place] [address] involved Moderate Complexity decision making due to 1-2 personal factors/comorbidities, 3 body structures involved/activity limitations, and evolving symptoms including changing pain levels.           Precautions:  None     OBJECTIVE:   Observation: jose 42 min     Total Treatment Time: 67 min         PLAN OF CARE:    Goals: achieved at 8-10 visits  1. Patient will see max pain level reduce by 50 % to 75% by 4wks  2.  Patient will demonstrate improved gait pattern to eliminate antalgia and deviation by 4 wk

## 2018-09-26 ENCOUNTER — OFFICE VISIT (OUTPATIENT)
Dept: PODIATRY CLINIC | Facility: CLINIC | Age: 43
End: 2018-09-26

## 2018-09-26 DIAGNOSIS — M76.62 ACHILLES TENDINITIS OF LEFT LOWER EXTREMITY: ICD-10-CM

## 2018-09-26 DIAGNOSIS — M67.88 ACHILLES TENDONOSIS OF LEFT LOWER EXTREMITY: Primary | ICD-10-CM

## 2018-09-26 PROCEDURE — 99213 OFFICE O/P EST LOW 20 MIN: CPT | Performed by: PODIATRIST

## 2018-09-27 ENCOUNTER — OFFICE VISIT (OUTPATIENT)
Dept: PHYSICAL THERAPY | Facility: HOSPITAL | Age: 43
End: 2018-09-27
Attending: INTERNAL MEDICINE
Payer: COMMERCIAL

## 2018-09-27 DIAGNOSIS — M76.62 ACHILLES TENDINITIS, LEFT LEG: ICD-10-CM

## 2018-09-27 PROCEDURE — 97110 THERAPEUTIC EXERCISES: CPT

## 2018-09-27 PROCEDURE — 97035 APP MDLTY 1+ULTRASOUND EA 15: CPT

## 2018-09-27 PROCEDURE — 97140 MANUAL THERAPY 1/> REGIONS: CPT

## 2018-09-27 PROCEDURE — 97162 PT EVAL MOD COMPLEX 30 MIN: CPT

## 2018-09-29 NOTE — PROGRESS NOTES
Latoya Rousseau is a 44year old female. Patient presents with:  Consult: achilles tendon pain left -- Onset 1st or 2nd week of August. Denies injury. No Dx tests done. Rates pain 4/10. Pain is worse at end of day.  States achilles has some swelling, per pt day. Disp: 90 each Rfl: 0   Insulin Syringe-Needle U-100 31G X 5/16\" 0.5 ML Does not apply Misc Inject three times daily Disp: 100 each Rfl: 0   Blood Glucose Monitoring Suppl ([employer] CONTOUR MONITOR) W/DEVICE Does not apply Kit by Does not apply route.  Kassie Feathers needs - non-medical: Not on file    Occupational History      Occupation: speech therapist    Tobacco Use      Smoking status: Never Smoker      Smokeless tobacco: Never Used    Substance and Sexual Activity      Alcohol use: No        Alcohol/week: 0.0 oz flexion against resistance and is palpated the pain is worse. ASSESSMENT AND PLAN:   Diagnoses and all orders for this visit:    Achilles tendonosis of left lower extremity  -     MRI ANKLE, LEFT (CPT=73721);  Future    Achilles tendinitis of left lower

## 2018-10-02 ENCOUNTER — APPOINTMENT (OUTPATIENT)
Dept: PHYSICAL THERAPY | Facility: HOSPITAL | Age: 43
End: 2018-10-02
Attending: INTERNAL MEDICINE
Payer: COMMERCIAL

## 2018-10-03 ENCOUNTER — HOSPITAL ENCOUNTER (OUTPATIENT)
Dept: MRI IMAGING | Facility: HOSPITAL | Age: 43
Discharge: HOME | End: 2018-10-03
Attending: PODIATRIST
Payer: COMMERCIAL

## 2018-10-03 ENCOUNTER — HOSPITAL ENCOUNTER (EMERGENCY)
Facility: HOSPITAL | Age: 43
Discharge: STILL ADMITTED OR ADMINISTRATIVE | End: 2018-10-04
Payer: COMMERCIAL

## 2018-10-03 DIAGNOSIS — M67.88 ACHILLES TENDONOSIS OF LEFT LOWER EXTREMITY: ICD-10-CM

## 2018-10-03 DIAGNOSIS — M76.62 ACHILLES TENDINITIS OF LEFT LOWER EXTREMITY: ICD-10-CM

## 2018-10-03 PROCEDURE — 73721 MRI JNT OF LWR EXTRE W/O DYE: CPT | Performed by: PODIATRIST

## 2018-10-04 ENCOUNTER — APPOINTMENT (OUTPATIENT)
Dept: PHYSICAL THERAPY | Facility: HOSPITAL | Age: 43
End: 2018-10-04
Attending: INTERNAL MEDICINE
Payer: COMMERCIAL

## 2018-10-05 ENCOUNTER — TELEPHONE (OUTPATIENT)
Dept: PODIATRY CLINIC | Facility: CLINIC | Age: 43
End: 2018-10-05

## 2018-10-09 ENCOUNTER — TELEPHONE (OUTPATIENT)
Dept: PODIATRY CLINIC | Facility: CLINIC | Age: 43
End: 2018-10-09

## 2018-10-09 ENCOUNTER — APPOINTMENT (OUTPATIENT)
Dept: PHYSICAL THERAPY | Facility: HOSPITAL | Age: 43
End: 2018-10-09
Attending: INTERNAL MEDICINE
Payer: COMMERCIAL

## 2018-10-09 NOTE — TELEPHONE ENCOUNTER
Dr. Kati Rogers, please advise  See phone encounter from 10/05/18. I left a message for pt to schedule appt with you to discuss test results. Pt is scheduled to see you 10/10/18.

## 2018-10-09 NOTE — TELEPHONE ENCOUNTER
LMTCB on pt's preferred # to schedule appt with Noorlag for test results and Tx plan. --  Please schedule pt appt with Noorlag when she calls back.  Thank you

## 2018-10-10 ENCOUNTER — OFFICE VISIT (OUTPATIENT)
Dept: PODIATRY CLINIC | Facility: CLINIC | Age: 43
End: 2018-10-10

## 2018-10-10 DIAGNOSIS — M76.62 ACHILLES TENDINITIS OF LEFT LOWER EXTREMITY: ICD-10-CM

## 2018-10-10 DIAGNOSIS — M67.88 ACHILLES TENDONOSIS OF LEFT LOWER EXTREMITY: Primary | ICD-10-CM

## 2018-10-10 PROCEDURE — 99213 OFFICE O/P EST LOW 20 MIN: CPT | Performed by: PODIATRIST

## 2018-10-10 RX ORDER — EXENATIDE 2 MG/.65ML
INJECTION, SUSPENSION, EXTENDED RELEASE SUBCUTANEOUS
Qty: 12 EACH | Refills: 0 | Status: SHIPPED | OUTPATIENT
Start: 2018-10-10 | End: 2019-03-29

## 2018-10-11 ENCOUNTER — OFFICE VISIT (OUTPATIENT)
Dept: PHYSICAL THERAPY | Facility: HOSPITAL | Age: 43
End: 2018-10-11
Attending: INTERNAL MEDICINE
Payer: COMMERCIAL

## 2018-10-11 DIAGNOSIS — M76.62 ACHILLES TENDINITIS, LEFT LEG: ICD-10-CM

## 2018-10-11 PROCEDURE — 97110 THERAPEUTIC EXERCISES: CPT

## 2018-10-11 NOTE — PROGRESS NOTES
Diagnosis: Left achilles tendonitis          Authorized # of Visits:  8  ([facility])       Next MD visit: none scheduled  Fall Risk: standard         Precautions: n/a           Medication Changes since last visit?: No  Subjective: \"I ha Total Timed Treatment: 38 min  Total Treatment Time: 40 min

## 2018-10-11 NOTE — PROGRESS NOTES
Marcin Ramírez is a 44year old female. Patient presents with: Foot Pain: Pt is here for a follow up on left achilles tendon pain. Pt went for an MRI. Pain level now. more constant 6-7        HPI:   Patient is here to review her MRI results  Allergies: Ca mellitus)    • High blood pressure    • High cholesterol 2/7/2014   • HORMONE DISORDER     PCOS   • Infertility female    • Obesity    • Other and unspecified hyperlipidemia 2/7/2014   • PCOS (polycystic ovarian syndrome)    • Seasonal allergies    • Amanda Not Asked        Hobby Hazards: Not Asked        Sleep Concern: Not Asked        Stress Concern: Not Asked        Weight Concern: Not Asked        Special Diet: Not Asked        Back Care: Not Asked        Exercise: Not Asked        Bike Helmet: Not Asked agrees to the plan. Return in about 4 weeks (around 11/7/2018).     Adamaris Gregory DPM  10/10/2018

## 2018-10-16 ENCOUNTER — OFFICE VISIT (OUTPATIENT)
Dept: PHYSICAL THERAPY | Facility: HOSPITAL | Age: 43
End: 2018-10-16
Attending: INTERNAL MEDICINE
Payer: COMMERCIAL

## 2018-10-16 DIAGNOSIS — M76.62 ACHILLES TENDINITIS, LEFT LEG: ICD-10-CM

## 2018-10-16 PROCEDURE — 97140 MANUAL THERAPY 1/> REGIONS: CPT

## 2018-10-16 PROCEDURE — 97110 THERAPEUTIC EXERCISES: CPT

## 2018-10-16 PROCEDURE — 97035 APP MDLTY 1+ULTRASOUND EA 15: CPT

## 2018-10-16 RX ORDER — METFORMIN HYDROCHLORIDE 500 MG/1
TABLET, EXTENDED RELEASE ORAL
Qty: 360 TABLET | Refills: 0 | Status: SHIPPED | OUTPATIENT
Start: 2018-10-16 | End: 2018-10-22

## 2018-10-16 NOTE — PROGRESS NOTES
Diagnosis: Left achilles tendonitis          Authorized # of Visits:  8  ([facility])       Next MD visit: none scheduled  Fall Risk: standard         Precautions: n/a           Medication Changes since last visit?: No  Subjective: \"I ha 50 % to 75% by 4wks  2. Patient will demonstrate improved gait pattern to eliminate antalgia and deviation by 4 wks  3.  Patient will demonstrate improved left ankle strength by 1/2 grade for IV, EV, DF and PF so patient is able to perform reciprocal stairs

## 2018-10-18 ENCOUNTER — APPOINTMENT (OUTPATIENT)
Dept: PHYSICAL THERAPY | Facility: HOSPITAL | Age: 43
End: 2018-10-18
Attending: INTERNAL MEDICINE
Payer: COMMERCIAL

## 2018-10-22 ENCOUNTER — APPOINTMENT (OUTPATIENT)
Dept: PHYSICAL THERAPY | Facility: HOSPITAL | Age: 43
End: 2018-10-22
Attending: INTERNAL MEDICINE
Payer: COMMERCIAL

## 2018-10-22 RX ORDER — METFORMIN HYDROCHLORIDE 500 MG/1
TABLET, EXTENDED RELEASE ORAL
Qty: 360 TABLET | Refills: 0 | Status: SHIPPED | OUTPATIENT
Start: 2018-10-22 | End: 2019-05-04

## 2018-10-24 ENCOUNTER — APPOINTMENT (OUTPATIENT)
Dept: PHYSICAL THERAPY | Facility: HOSPITAL | Age: 43
End: 2018-10-24
Attending: INTERNAL MEDICINE
Payer: COMMERCIAL

## 2018-10-30 ENCOUNTER — APPOINTMENT (OUTPATIENT)
Dept: PHYSICAL THERAPY | Facility: HOSPITAL | Age: 43
End: 2018-10-30
Attending: INTERNAL MEDICINE
Payer: COMMERCIAL

## 2018-11-01 ENCOUNTER — OFFICE VISIT (OUTPATIENT)
Dept: PHYSICAL THERAPY | Facility: HOSPITAL | Age: 43
End: 2018-11-01
Attending: INTERNAL MEDICINE
Payer: COMMERCIAL

## 2018-11-01 PROCEDURE — 97110 THERAPEUTIC EXERCISES: CPT

## 2018-11-01 PROCEDURE — 97140 MANUAL THERAPY 1/> REGIONS: CPT

## 2018-11-01 NOTE — PROGRESS NOTES
Diagnosis: Left achilles tendonitis          Authorized # of Visits:  8  ([facility]")       Next MD visit: none scheduled  Fall Risk: standard         Precautions: n/a           Medication Changes since last visit?: No  Subjective: \"I ha pain management, ROM, gentle stretching and gentle resistive exercises.  Assess response to today's exercise and treatment at next session     Charges: Ex x 2, MM x 1     Total Timed Treatment: 40 min  Total Treatment Time: 40 min

## 2018-11-05 ENCOUNTER — OFFICE VISIT (OUTPATIENT)
Dept: PHYSICAL THERAPY | Facility: HOSPITAL | Age: 43
End: 2018-11-05
Attending: INTERNAL MEDICINE
Payer: COMMERCIAL

## 2018-11-05 PROCEDURE — 97140 MANUAL THERAPY 1/> REGIONS: CPT

## 2018-11-05 PROCEDURE — 97110 THERAPEUTIC EXERCISES: CPT

## 2018-11-05 NOTE — PROGRESS NOTES
Diagnosis: Left achilles tendonitis          Authorized # of Visits:  8  ([facility])       Next MD visit: none scheduled  Fall Risk: standard         Precautions: n/a           Medication Changes since last visit?: No  Subjective:  \"I h presence of partial tears. Goals: achieved at 8-10 visits  1. Patient will see max pain level reduce by 50 % to 75% by 4wks  2. Patient will demonstrate improved gait pattern to eliminate antalgia and deviation by 4 wks  3.  Patient will demonstrate improv

## 2018-11-08 ENCOUNTER — OFFICE VISIT (OUTPATIENT)
Dept: PHYSICAL THERAPY | Facility: HOSPITAL | Age: 43
End: 2018-11-08
Attending: INTERNAL MEDICINE
Payer: COMMERCIAL

## 2018-11-08 PROCEDURE — 97140 MANUAL THERAPY 1/> REGIONS: CPT

## 2018-11-08 PROCEDURE — 97110 THERAPEUTIC EXERCISES: CPT

## 2018-11-08 NOTE — PROGRESS NOTES
Diagnosis: Left achilles tendonitis          Authorized # of Visits:  8  ([facility])       Next MD visit: none scheduled  Fall Risk: standard         Precautions: n/a           Medication Changes since last visit?: No  Subjective:  \"I h upper gastroc prone  Manual: x 10 min  STM: mid and upper gastroc prone     Modalities  -         Assessment: Progressed standing WB exercises per patient tolerance. Some gains in overall pain reduction though still some presence of similar pain range.   Pa

## 2018-11-13 ENCOUNTER — OFFICE VISIT (OUTPATIENT)
Dept: PHYSICAL THERAPY | Facility: HOSPITAL | Age: 43
End: 2018-11-13
Attending: INTERNAL MEDICINE
Payer: COMMERCIAL

## 2018-11-13 PROCEDURE — 97110 THERAPEUTIC EXERCISES: CPT

## 2018-11-13 NOTE — PROGRESS NOTES
Diagnosis: Left achilles tendonitis          Authorized # of Visits:  8  ([facility])       Next MD visit: none scheduled  Fall Risk: standard         Precautions: n/a           Medication Changes since last visit?: No  Subjective:  \"I a x 30 sec hold  Long sitting DF/PF with GTB x 20 ea  Long sitting IV/EV with GTB x 20 ea  Long sitting CW/CCW x 20 ea  Long sitting Isometric iV/EV 5 x 15 sec hold  seataed heel and toe raises x 20 ea  Seated toe crunch x 20  Standing heel/toe raises x 10

## 2018-11-15 ENCOUNTER — APPOINTMENT (OUTPATIENT)
Dept: PHYSICAL THERAPY | Facility: HOSPITAL | Age: 43
End: 2018-11-15
Attending: INTERNAL MEDICINE
Payer: COMMERCIAL

## 2018-11-20 ENCOUNTER — OFFICE VISIT (OUTPATIENT)
Dept: PHYSICAL THERAPY | Facility: HOSPITAL | Age: 43
End: 2018-11-20
Attending: INTERNAL MEDICINE
Payer: COMMERCIAL

## 2018-11-20 PROCEDURE — 97110 THERAPEUTIC EXERCISES: CPT

## 2018-11-20 NOTE — PROGRESS NOTES
Diagnosis: Left achilles tendonitis          Authorized # of Visits:  8  ([facility])       Next MD visit: none scheduled  Fall Risk: standard         Precautions: n/a           Medication Changes since last visit?: No  Subjective:  \"I f you for your referral. Please co-sign or sign and return this letter via fax as soon as possible to [phone].  If you have any questions, please contact me at Dept: [phone]    Sincerely,  Electronically signed by therapist: Avis Harris PT    Phys

## 2018-11-21 ENCOUNTER — OFFICE VISIT (OUTPATIENT)
Dept: PODIATRY CLINIC | Facility: CLINIC | Age: 43
End: 2018-11-21

## 2018-11-21 DIAGNOSIS — M76.62 ACHILLES TENDINITIS OF LEFT LOWER EXTREMITY: ICD-10-CM

## 2018-11-21 DIAGNOSIS — M67.88 ACHILLES TENDONOSIS OF LEFT LOWER EXTREMITY: Primary | ICD-10-CM

## 2018-11-21 PROCEDURE — 99213 OFFICE O/P EST LOW 20 MIN: CPT | Performed by: PODIATRIST

## 2018-11-26 ENCOUNTER — TELEPHONE (OUTPATIENT)
Dept: PHYSICAL THERAPY | Facility: HOSPITAL | Age: 43
End: 2018-11-26

## 2018-11-26 ENCOUNTER — APPOINTMENT (OUTPATIENT)
Dept: PHYSICAL THERAPY | Facility: HOSPITAL | Age: 43
End: 2018-11-26
Attending: INTERNAL MEDICINE
Payer: COMMERCIAL

## 2018-11-27 NOTE — PROGRESS NOTES
Dulce Maria Nurse is a 44year old female. Patient presents with:   Follow - Up: left achilles tendon tear, pain with walking and stairs, continues with PT pain stays at 2/10        HPI:   This patient presents to the clinic as a chief complaint of having her Allergic rhinitis    • Asthma     allergy induced   • Carpal tunnel syndrome    • Diabetes ([address]     diabetes for 2011   • GDM (gestational diabetes mellitus)    • High blood pressure    • High cholesterol 2/7/2014   • HORMONE DISORDER     PCOS   • Inferti exertion  GI: denies abdominal pain and denies heartburn  NEURO: denies headaches    EXAM:   There were no vitals taken for this visit.   Physical Exam  GENERAL: well developed, well nourished, in no apparent distress  EXTREMITIES:   Patient was evaluated t

## 2018-11-29 ENCOUNTER — OFFICE VISIT (OUTPATIENT)
Dept: PHYSICAL THERAPY | Facility: HOSPITAL | Age: 43
End: 2018-11-29
Attending: INTERNAL MEDICINE
Payer: COMMERCIAL

## 2018-11-29 PROCEDURE — 97140 MANUAL THERAPY 1/> REGIONS: CPT

## 2018-11-29 PROCEDURE — 97110 THERAPEUTIC EXERCISES: CPT

## 2018-11-29 NOTE — PROGRESS NOTES
Diagnosis: Left achilles tendonitis          Authorized # of Visits:  14  ([facility])       Next MD visit: none scheduled  Fall Risk: standard         Precautions: n/a           Medication Changes since last visit?: No  Subjective:  \"I demonstrate improved left ankle strength by 1/2 grade for IV, EV, DF and PF so patient is able to perform reciprocal stairs with ease.-Partially MET  4.   Patient with pain reduction to allow return to linh class and stairs with good tolerance. -Not MET

## 2018-12-06 ENCOUNTER — APPOINTMENT (OUTPATIENT)
Dept: PHYSICAL THERAPY | Facility: HOSPITAL | Age: 43
End: 2018-12-06
Attending: INTERNAL MEDICINE
Payer: COMMERCIAL

## 2018-12-12 ENCOUNTER — OFFICE VISIT (OUTPATIENT)
Dept: PHYSICAL THERAPY | Facility: HOSPITAL | Age: 43
End: 2018-12-12
Attending: INTERNAL MEDICINE
Payer: COMMERCIAL

## 2018-12-12 PROCEDURE — 97110 THERAPEUTIC EXERCISES: CPT

## 2018-12-12 NOTE — PROGRESS NOTES
Diagnosis: Left achilles tendonitis          Authorized # of Visits:  8+6=14 visits total  ([facility])       Next MD visit: none scheduled  Fall Risk: standard         Precautions: n/a           Medication Changes since last visit?: No for achilles tendonitis technique     Modalities         Assessment: Patient continued tender to touch left achilles. Need encouragement for home calf stretching and pt verbalized understanding,  after calf stretch patient c/o less tightness achilles.  Adde

## 2018-12-17 ENCOUNTER — OFFICE VISIT (OUTPATIENT)
Dept: PHYSICAL THERAPY | Facility: HOSPITAL | Age: 43
End: 2018-12-17
Attending: INTERNAL MEDICINE
Payer: COMMERCIAL

## 2018-12-17 PROCEDURE — 97110 THERAPEUTIC EXERCISES: CPT

## 2018-12-17 NOTE — PROGRESS NOTES
Diagnosis: Left achilles tendonitis          Authorized # of Visits:  8+6=14 visits total  ([facility])       Next MD visit: none scheduled  Fall Risk: standard         Precautions: n/a           Medication Changes since last visit?: No Anterior step down x20      Manual  Manual therapy: 14 min :STM and KT  Tape for achilles tendonitis technique  Manual:   Modalities        Assessment: Patient continued tender to touch left achilles.  Added fwd/lat and anterior step to improved stair climb

## 2018-12-19 ENCOUNTER — APPOINTMENT (OUTPATIENT)
Dept: PHYSICAL THERAPY | Facility: HOSPITAL | Age: 43
End: 2018-12-19
Attending: INTERNAL MEDICINE
Payer: COMMERCIAL

## 2018-12-26 ENCOUNTER — TELEPHONE (OUTPATIENT)
Dept: PHYSICAL THERAPY | Facility: HOSPITAL | Age: 43
End: 2018-12-26

## 2018-12-26 NOTE — TELEPHONE ENCOUNTER
called patient and she had \"completely forgot appt\" this AM.  Apologized. confirmed next appt.   Referral was to run out on 12/31/18 and had 4 more available on current referral. Patient wanting to be seen for 4 more sessions and to make appts for after t

## 2018-12-28 ENCOUNTER — OFFICE VISIT (OUTPATIENT)
Dept: PHYSICAL THERAPY | Facility: HOSPITAL | Age: 43
End: 2018-12-28
Attending: INTERNAL MEDICINE
Payer: COMMERCIAL

## 2018-12-28 PROCEDURE — 97110 THERAPEUTIC EXERCISES: CPT

## 2018-12-28 PROCEDURE — 97140 MANUAL THERAPY 1/> REGIONS: CPT

## 2018-12-28 NOTE — PROGRESS NOTES
Diagnosis: Left achilles tendonitis          Authorized # of Visits:  8+6=14 visits total  (1120 \[facility] [place] [facility]\"")       Next MD visit: none scheduled  Fall Risk: standard         Precautions: n/a           Medication Changes since last visit?: No [phone]. If you have any questions, please contact me at Dept: [phone].     Sincerely,  Luis Nieves    Electronically signed by therapist: Luis Nieves, PT    [de-identified] certification required: Yes  I certify the need for these services furnishe

## 2019-01-03 ENCOUNTER — APPOINTMENT (OUTPATIENT)
Dept: PHYSICAL THERAPY | Facility: HOSPITAL | Age: 44
End: 2019-01-03
Attending: INTERNAL MEDICINE
Payer: COMMERCIAL

## 2019-01-07 ENCOUNTER — TELEPHONE (OUTPATIENT)
Dept: PODIATRY CLINIC | Facility: CLINIC | Age: 44
End: 2019-01-07

## 2019-01-07 DIAGNOSIS — M67.88 ACHILLES TENDONOSIS OF LEFT LOWER EXTREMITY: ICD-10-CM

## 2019-01-07 DIAGNOSIS — M76.62 ACHILLES TENDINITIS OF LEFT LOWER EXTREMITY: Primary | ICD-10-CM

## 2019-01-08 ENCOUNTER — APPOINTMENT (OUTPATIENT)
Dept: PHYSICAL THERAPY | Facility: HOSPITAL | Age: 44
End: 2019-01-08
Attending: INTERNAL MEDICINE
Payer: COMMERCIAL

## 2019-01-10 ENCOUNTER — APPOINTMENT (OUTPATIENT)
Dept: PHYSICAL THERAPY | Facility: HOSPITAL | Age: 44
End: 2019-01-10
Attending: INTERNAL MEDICINE
Payer: COMMERCIAL

## 2019-01-14 ENCOUNTER — APPOINTMENT (OUTPATIENT)
Dept: PHYSICAL THERAPY | Facility: HOSPITAL | Age: 44
End: 2019-01-14
Attending: INTERNAL MEDICINE
Payer: COMMERCIAL

## 2019-01-17 ENCOUNTER — TELEPHONE (OUTPATIENT)
Dept: FAMILY MEDICINE CLINIC | Facility: CLINIC | Age: 44
End: 2019-01-17

## 2019-01-17 DIAGNOSIS — M76.62 TENDONITIS, ACHILLES, LEFT: Primary | ICD-10-CM

## 2019-01-17 NOTE — TELEPHONE ENCOUNTER
Dr Shama Whitfield, Pt left a  requesting a referral for Dr Jeffery Arizmendi. Please sign referral if you agree.  Thank you, Constantine, [facility]

## 2019-01-23 ENCOUNTER — NURSE TRIAGE (OUTPATIENT)
Dept: FAMILY MEDICINE CLINIC | Facility: CLINIC | Age: 44
End: 2019-01-23

## 2019-01-23 DIAGNOSIS — M79.671 PAIN OF RIGHT HEEL: Primary | ICD-10-CM

## 2019-01-23 NOTE — TELEPHONE ENCOUNTER
Advised patient of Dr Joselito Villar note. Patient already has an appointment with podiatry. Patient verbalized understanding and had no further questions.

## 2019-01-23 NOTE — TELEPHONE ENCOUNTER
Action Requested: Summary for Provider     []  Critical Lab, Recommendations Needed  [x] Need Additional Advice  []   FYI    []   Need Orders  [] Need Medications Sent to Pharmacy  []  Other     SUMMARY: Pt asking if it's possible to just get a referral to

## 2019-01-28 ENCOUNTER — OFFICE VISIT (OUTPATIENT)
Dept: PODIATRY CLINIC | Facility: CLINIC | Age: 44
End: 2019-01-28

## 2019-01-28 VITALS — DIASTOLIC BLOOD PRESSURE: 80 MMHG | SYSTOLIC BLOOD PRESSURE: 113 MMHG | RESPIRATION RATE: 20 BRPM | HEART RATE: 108 BPM

## 2019-01-28 DIAGNOSIS — M72.2 PLANTAR FASCIITIS OF RIGHT FOOT: ICD-10-CM

## 2019-01-28 DIAGNOSIS — M76.62 ACHILLES TENDINITIS OF LEFT LOWER EXTREMITY: Primary | ICD-10-CM

## 2019-01-28 DIAGNOSIS — M67.88 ACHILLES TENDONOSIS OF LEFT LOWER EXTREMITY: ICD-10-CM

## 2019-01-28 PROCEDURE — 99213 OFFICE O/P EST LOW 20 MIN: CPT | Performed by: PODIATRIST

## 2019-01-28 PROCEDURE — 20550 NJX 1 TENDON SHEATH/LIGAMENT: CPT | Performed by: PODIATRIST

## 2019-01-28 RX ORDER — TRIAMCINOLONE ACETONIDE 40 MG/ML
40 INJECTION, SUSPENSION INTRA-ARTICULAR; INTRAMUSCULAR ONCE
Status: COMPLETED | OUTPATIENT
Start: 2019-01-28 | End: 2019-01-28

## 2019-01-28 RX ADMIN — TRIAMCINOLONE ACETONIDE 40 MG: 40 INJECTION, SUSPENSION INTRA-ARTICULAR; INTRAMUSCULAR at 18:32:00

## 2019-01-29 NOTE — PROGRESS NOTES
Keyonna Price is a 44year old female. Patient presents with:   Ankle Pain: Left f/u - she finished PT and is better, She has pain now in the R heel and is rated as 7-8/10 on and off         HPI:   This pleasant 61-year-old female patient returns to the  Inject three times a day.  Disp: 90 each Rfl: 0   Insulin Syringe-Needle U-100 31G X 5/16\" 0.5 ML Does not apply Misc Inject three times daily Disp: 100 each Rfl: 0   Blood Glucose Monitoring Suppl (Command Information CONTOUR MONITOR) W/DEVICE Does not apply Kit by [place] Drug use: No    Other Topics      Concerns:        Caffeine Concern: Yes          soda-1 cup/day          REVIEW OF SYSTEMS:   Review of Systems  GENERAL HEALTH: feels well otherwise  SKIN: denies any unusual skin lesions or rashes  RESPIRATORY: denies yvon

## 2019-01-29 NOTE — PROGRESS NOTES
Per dr Kati Rogers request was draw 1 syringe with 1 ml Kenalog 40 and 1 ml Marcaine 0.5% for this patient right heel.  Akira Linder

## 2019-02-01 ENCOUNTER — TELEPHONE (OUTPATIENT)
Dept: FAMILY MEDICINE CLINIC | Facility: CLINIC | Age: 44
End: 2019-02-01

## 2019-02-01 DIAGNOSIS — IMO0001 UNCONTROLLED TYPE 2 DIABETES MELLITUS WITHOUT COMPLICATION, WITH LONG-TERM CURRENT USE OF INSULIN: Primary | ICD-10-CM

## 2019-02-04 NOTE — TELEPHONE ENCOUNTER
Referral pended and routed to Dr. William London for approval. Appointment was rescheduled to March.

## 2019-02-05 RX ORDER — ATORVASTATIN CALCIUM 40 MG/1
TABLET, FILM COATED ORAL
Qty: 90 TABLET | Refills: 0 | Status: SHIPPED | OUTPATIENT
Start: 2019-02-05 | End: 2019-05-04

## 2019-02-05 RX ORDER — LISINOPRIL 5 MG/1
5 TABLET ORAL
Qty: 90 TABLET | Refills: 0 | Status: SHIPPED | OUTPATIENT
Start: 2019-02-05 | End: 2019-05-04

## 2019-02-05 NOTE — TELEPHONE ENCOUNTER
Please review; protocol failed.     Cholesterol Medications  Protocol Criteria:  · Appointment scheduled in the past 12 months or in the next 3 months  · ALT & LDL on file in the past 12 months  · ALT result < 80  · LDL result <130   Recent Outpatient Visit Results   Component Value Date     (H) 07/10/2015    BUN 11 07/10/2015    CREATSERUM 0.59 07/10/2015    BUNCREA 18.6 07/10/2015    GFRNAA >60 07/10/2015    GFRAA >60 07/10/2015    CA 9.4 07/10/2015    ALKPHOS 94 07/10/2015     (H) 07/10/2015

## 2019-03-08 ENCOUNTER — OFFICE VISIT (OUTPATIENT)
Dept: ENDOCRINOLOGY CLINIC | Facility: CLINIC | Age: 44
End: 2019-03-08

## 2019-03-08 VITALS — HEIGHT: 63 IN | WEIGHT: 251.19 LBS | BODY MASS INDEX: 44.51 KG/M2

## 2019-03-08 DIAGNOSIS — E78.5 DYSLIPIDEMIA: ICD-10-CM

## 2019-03-08 DIAGNOSIS — E11.65 UNCONTROLLED TYPE 2 DIABETES MELLITUS WITH HYPERGLYCEMIA, WITH LONG-TERM CURRENT USE OF INSULIN (HCC): Primary | ICD-10-CM

## 2019-03-08 DIAGNOSIS — Z79.4 UNCONTROLLED TYPE 2 DIABETES MELLITUS WITH HYPERGLYCEMIA, WITH LONG-TERM CURRENT USE OF INSULIN (HCC): Primary | ICD-10-CM

## 2019-03-08 LAB
CARTRIDGE LOT#: ABNORMAL NUMERIC
GLUCOSE BLOOD: 157
HEMOGLOBIN A1C: 9.1 % (ref 4.3–5.6)
TEST STRIP LOT #: NORMAL NUMERIC

## 2019-03-08 PROCEDURE — 99212 OFFICE O/P EST SF 10 MIN: CPT | Performed by: INTERNAL MEDICINE

## 2019-03-08 PROCEDURE — 82962 GLUCOSE BLOOD TEST: CPT | Performed by: INTERNAL MEDICINE

## 2019-03-08 PROCEDURE — 36416 COLLJ CAPILLARY BLOOD SPEC: CPT | Performed by: INTERNAL MEDICINE

## 2019-03-08 PROCEDURE — 83036 HEMOGLOBIN GLYCOSYLATED A1C: CPT | Performed by: INTERNAL MEDICINE

## 2019-03-08 PROCEDURE — 99214 OFFICE O/P EST MOD 30 MIN: CPT | Performed by: INTERNAL MEDICINE

## 2019-03-08 NOTE — PROGRESS NOTES
Return Office Visit - Diabetes    CHIEF COMPLAINT:    Type 2 DM   Dyslipidemia    HISTORY OF PRESENT ILLNESS:   Santiago Rivera is a 44year old female who presents for follow up for diabetes. .    DM HISTORY  Was diagnosed with PCOS at age 22.  She concei Subcutaneous Pen-injector INJECT 2 MG (1 PEN) INTO THE SKIN ONCE A WEEK. Disp: 12 each Rfl: 0   Levonorgestrel (MIRENA, 52 MG,) 20 MCG/24HR Intrauterine IUD Mirena 20 mcg/24 hr (5 years) intrauterine device Take by intrauterine route.  Disp:  Rfl:    betame cooperative, no apparent distress,  PSYCH: normal affect  EYES:  No proptosis, no ptosis, conjunctiva normal  ENT:  Normocephalic, atraumatic  NECK:  Supple, symmetrical, trachea midline, no adenopathy, thyroid symmetric, not enlarged and no tenderness  ASHLEY Nephropathy:  Is on lisinopril. Discussed that better BG control will prevent progression. Repeat Ur MA normal.   c). Instructed on importance of annual eye exams. d). Foot exam: Daily feet exam explained  e).  BG log maintainence explained in great detail

## 2019-03-29 RX ORDER — EXENATIDE 2 MG/.65ML
INJECTION, SUSPENSION, EXTENDED RELEASE SUBCUTANEOUS
Qty: 12 EACH | Refills: 0 | Status: SHIPPED | OUTPATIENT
Start: 2019-03-29 | End: 2019-12-30

## 2019-05-04 RX ORDER — METFORMIN HYDROCHLORIDE 500 MG/1
TABLET, EXTENDED RELEASE ORAL
Qty: 360 TABLET | Refills: 0 | Status: SHIPPED | OUTPATIENT
Start: 2019-05-04 | End: 2019-08-13

## 2019-05-04 RX ORDER — ATORVASTATIN CALCIUM 40 MG/1
TABLET, FILM COATED ORAL
Qty: 90 TABLET | Refills: 0 | Status: SHIPPED | OUTPATIENT
Start: 2019-05-04 | End: 2019-09-04

## 2019-05-04 RX ORDER — LISINOPRIL 5 MG/1
5 TABLET ORAL
Qty: 90 TABLET | Refills: 0 | Status: SHIPPED | OUTPATIENT
Start: 2019-05-04 | End: 2019-08-12

## 2019-05-06 RX ORDER — INSULIN HUMAN 500 [IU]/ML
INJECTION, SOLUTION SUBCUTANEOUS
Qty: 54 ML | Refills: 2 | Status: SHIPPED | OUTPATIENT
Start: 2019-05-06 | End: 2019-12-23

## 2019-06-05 ENCOUNTER — OFFICE VISIT (OUTPATIENT)
Dept: PODIATRY CLINIC | Facility: CLINIC | Age: 44
End: 2019-06-05

## 2019-06-05 DIAGNOSIS — M67.88 ACHILLES TENDONOSIS OF LEFT LOWER EXTREMITY: ICD-10-CM

## 2019-06-05 DIAGNOSIS — M72.2 PLANTAR FASCIITIS OF RIGHT FOOT: ICD-10-CM

## 2019-06-05 DIAGNOSIS — M72.2 PLANTAR FASCIITIS OF LEFT FOOT: ICD-10-CM

## 2019-06-05 DIAGNOSIS — M76.62 ACHILLES TENDINITIS OF LEFT LOWER EXTREMITY: Primary | ICD-10-CM

## 2019-06-05 PROCEDURE — 99213 OFFICE O/P EST LOW 20 MIN: CPT | Performed by: PODIATRIST

## 2019-06-05 NOTE — PROGRESS NOTES
Mary Jasso is a 44year old female. Patient presents with:   Ankle Pain: Left f/u - had cortisone inj on 1/28/19 in the R heel - states she is pretty good, has a twinge once in a while - the R heel is much better - still has some little pain once in a Pen-injector INJECT 2 MG (1 PEN) INTO THE SKIN ONCE A WEEK. Disp: 12 each Rfl: 0   Dapagliflozin Propanediol (FARXIGA) 10 MG Oral Tab Take 10 mg by mouth daily.  Disp: 90 tablet Rfl: 0   Insulin Pen Needle (CAREFINE PEN NEEDLES) 32G X 4 MM Does not apply Mi SURGICAL HISTORY      IVF   • REDUCTION LEFT     • REDUCTION OF LARGE BREAST  1998   • REDUCTION RIGHT     • TONSILLECTOMY  1980   • WRIST CARPAL TUNNEL RELEASE Left 1/26/2018    Performed by Corey Feliz MD at [address] [place] [address] MAIN OR      Family History   Family lower extremity    Plantar fasciitis of right foot    Plantar fasciitis of left foot        Plan: We will place a referral for a new pair of orthotics these will be full-length in her shoes may be a little bit wider.   I think this is necessary because the

## 2019-06-08 ENCOUNTER — TELEPHONE (OUTPATIENT)
Dept: ENDOCRINOLOGY CLINIC | Facility: CLINIC | Age: 44
End: 2019-06-08

## 2019-06-10 RX ORDER — DAPAGLIFLOZIN 10 MG/1
TABLET, FILM COATED ORAL
Qty: 90 TABLET | Refills: 0 | Status: SHIPPED | OUTPATIENT
Start: 2019-06-10 | End: 2019-09-27

## 2019-06-10 NOTE — TELEPHONE ENCOUNTER
Last filled: 3/8/19 #90 tab with 0 refills  LOV: 3/8/19   Future Appointments   Date Time Provider Salvador Duran   6/28/2019  3:30 PM Duarte Michael [facility]   7/17/2019  9:30 AM Parvez Rios MD [facility] ADO     RTC in 3 mo.

## 2019-06-10 NOTE — TELEPHONE ENCOUNTER
Booked for 07/17 - pt advised us her son is still in the hospital. I stated she can send us a [address] if anything comes up so we can assist with further care for her.

## 2019-07-08 ENCOUNTER — OFFICE VISIT (OUTPATIENT)
Dept: PODIATRY CLINIC | Facility: CLINIC | Age: 44
End: 2019-07-08

## 2019-07-08 DIAGNOSIS — M72.2 PLANTAR FASCIITIS OF LEFT FOOT: ICD-10-CM

## 2019-07-08 DIAGNOSIS — M67.88 ACHILLES TENDONOSIS OF LEFT LOWER EXTREMITY: ICD-10-CM

## 2019-07-08 DIAGNOSIS — M76.62 ACHILLES TENDINITIS OF LEFT LOWER EXTREMITY: Primary | ICD-10-CM

## 2019-07-08 DIAGNOSIS — M72.2 PLANTAR FASCIITIS OF RIGHT FOOT: ICD-10-CM

## 2019-07-08 PROCEDURE — 99213 OFFICE O/P EST LOW 20 MIN: CPT | Performed by: PODIATRIST

## 2019-07-08 PROCEDURE — L3000 FT INSERT UCB BERKELEY SHELL: HCPCS | Performed by: PODIATRIST

## 2019-07-08 PROCEDURE — 29799 UNLISTED PX CASTING/STRPG: CPT | Performed by: PODIATRIST

## 2019-07-08 NOTE — PROGRESS NOTES
Arun Bailon is a 44year old female. Patient presents with:  Orthotic Status: pt is in office to have orthotics casted.  pain increasing in right heel to 1-2/10 after cortisone injection        HPI:   Patient was seen today left heel is again becoming s Glucose Monitoring Suppl (RoomActually CONTOUR MONITOR) W/DEVICE Does not apply Kit by Does not apply route.  Disp:  Rfl:       Past Medical History:   Diagnosis Date   • Allergic rhinitis    • Asthma     allergy induced   • Carpal tunnel syndrome    • Diabetes (H documented below  GENERAL HEALTH: feels well otherwise  SKIN: denies any unusual skin lesions or rashes  RESPIRATORY: denies shortness of breath with exertion  CARDIOVASCULAR: denies chest pain on exertion  GI: denies abdominal pain and denies heartburn  N

## 2019-07-31 ENCOUNTER — TELEPHONE (OUTPATIENT)
Dept: PODIATRY CLINIC | Facility: CLINIC | Age: 44
End: 2019-07-31

## 2019-08-03 ENCOUNTER — APPOINTMENT (OUTPATIENT)
Dept: LAB | Facility: HOSPITAL | Age: 44
End: 2019-08-03
Attending: OBSTETRICS & GYNECOLOGY
Payer: COMMERCIAL

## 2019-08-03 ENCOUNTER — MEDICAL CORRESPONDENCE (OUTPATIENT)
Dept: ENDOCRINOLOGY CLINIC | Facility: CLINIC | Age: 44
End: 2019-08-03

## 2019-08-03 ENCOUNTER — HOSPITAL ENCOUNTER (OUTPATIENT)
Dept: MAMMOGRAPHY | Facility: HOSPITAL | Age: 44
Discharge: HOME | End: 2019-08-03
Attending: OBSTETRICS & GYNECOLOGY
Payer: COMMERCIAL

## 2019-08-03 DIAGNOSIS — E78.5 DYSLIPIDEMIA: ICD-10-CM

## 2019-08-03 DIAGNOSIS — E11.65 UNCONTROLLED TYPE 2 DIABETES MELLITUS WITH HYPERGLYCEMIA, WITH LONG-TERM CURRENT USE OF INSULIN (HCC): ICD-10-CM

## 2019-08-03 DIAGNOSIS — Z12.31 ENCOUNTER FOR SCREENING MAMMOGRAM FOR MALIGNANT NEOPLASM OF BREAST: ICD-10-CM

## 2019-08-03 DIAGNOSIS — Z79.4 UNCONTROLLED TYPE 2 DIABETES MELLITUS WITH HYPERGLYCEMIA, WITH LONG-TERM CURRENT USE OF INSULIN (HCC): ICD-10-CM

## 2019-08-03 LAB
ALBUMIN SERPL-MCNC: 3.6 G/DL (ref 3.4–5)
ALBUMIN/GLOB SERPL: 1 {RATIO} (ref 1–2)
ALP LIVER SERPL-CCNC: 102 U/L (ref 37–98)
ALT SERPL-CCNC: 36 U/L (ref 13–56)
ANION GAP SERPL CALC-SCNC: 8 MMOL/L (ref 0–18)
AST SERPL-CCNC: 26 U/L (ref 15–37)
BILIRUB SERPL-MCNC: 1.1 MG/DL (ref 0.1–2)
BUN BLD-MCNC: 11 MG/DL (ref 7–18)
BUN/CREAT SERPL: 16.7 (ref 10–20)
CALCIUM BLD-MCNC: 8.4 MG/DL (ref 8.5–10.1)
CHLORIDE SERPL-SCNC: 107 MMOL/L (ref 98–112)
CHOLEST SMN-MCNC: 142 MG/DL (ref ?–200)
CO2 SERPL-SCNC: 25 MMOL/L (ref 21–32)
CREAT BLD-MCNC: 0.66 MG/DL (ref 0.55–1.02)
CREAT UR-SCNC: 158 MG/DL
GLOBULIN PLAS-MCNC: 3.5 G/DL (ref 2.8–4.4)
GLUCOSE BLD-MCNC: 194 MG/DL (ref 70–99)
HDLC SERPL-MCNC: 47 MG/DL (ref 40–59)
LDLC SERPL CALC-MCNC: 57 MG/DL (ref ?–100)
M PROTEIN MFR SERPL ELPH: 7.1 G/DL (ref 6.4–8.2)
MICROALBUMIN UR-MCNC: 1.82 MG/DL
MICROALBUMIN/CREAT 24H UR-RTO: 11.5 UG/MG (ref ?–30)
NONHDLC SERPL-MCNC: 95 MG/DL (ref ?–130)
OSMOLALITY SERPL CALC.SUM OF ELEC: 295 MOSM/KG (ref 275–295)
PATIENT FASTING: YES
PATIENT FASTING: YES
POTASSIUM SERPL-SCNC: 4.2 MMOL/L (ref 3.5–5.1)
SODIUM SERPL-SCNC: 140 MMOL/L (ref 136–145)
TRIGL SERPL-MCNC: 190 MG/DL (ref 30–149)
VLDLC SERPL CALC-MCNC: 38 MG/DL (ref 0–30)

## 2019-08-03 PROCEDURE — 36415 COLL VENOUS BLD VENIPUNCTURE: CPT

## 2019-08-03 PROCEDURE — 82043 UR ALBUMIN QUANTITATIVE: CPT

## 2019-08-03 PROCEDURE — 82570 ASSAY OF URINE CREATININE: CPT

## 2019-08-03 PROCEDURE — 80061 LIPID PANEL: CPT

## 2019-08-03 PROCEDURE — 77067 SCR MAMMO BI INCL CAD: CPT | Performed by: OBSTETRICS & GYNECOLOGY

## 2019-08-03 PROCEDURE — 80053 COMPREHEN METABOLIC PANEL: CPT

## 2019-08-03 PROCEDURE — 77063 BREAST TOMOSYNTHESIS BI: CPT | Performed by: OBSTETRICS & GYNECOLOGY

## 2019-08-09 ENCOUNTER — OFFICE VISIT (OUTPATIENT)
Dept: ENDOCRINOLOGY CLINIC | Facility: CLINIC | Age: 44
End: 2019-08-09

## 2019-08-09 VITALS
DIASTOLIC BLOOD PRESSURE: 85 MMHG | HEART RATE: 115 BPM | SYSTOLIC BLOOD PRESSURE: 138 MMHG | WEIGHT: 243.38 LBS | BODY MASS INDEX: 43 KG/M2

## 2019-08-09 DIAGNOSIS — Z79.4 UNCONTROLLED TYPE 2 DIABETES MELLITUS WITH HYPERGLYCEMIA, WITH LONG-TERM CURRENT USE OF INSULIN (HCC): Primary | ICD-10-CM

## 2019-08-09 DIAGNOSIS — E78.5 DYSLIPIDEMIA: ICD-10-CM

## 2019-08-09 DIAGNOSIS — E11.65 UNCONTROLLED TYPE 2 DIABETES MELLITUS WITH HYPERGLYCEMIA, WITH LONG-TERM CURRENT USE OF INSULIN (HCC): Primary | ICD-10-CM

## 2019-08-09 LAB
CARTRIDGE LOT#: ABNORMAL NUMERIC
GLUCOSE BLOOD: 110
HEMOGLOBIN A1C: 8.4 % (ref 4.3–5.6)
TEST STRIP LOT #: NORMAL NUMERIC

## 2019-08-09 PROCEDURE — 82962 GLUCOSE BLOOD TEST: CPT | Performed by: INTERNAL MEDICINE

## 2019-08-09 PROCEDURE — 83036 HEMOGLOBIN GLYCOSYLATED A1C: CPT | Performed by: INTERNAL MEDICINE

## 2019-08-09 PROCEDURE — 99214 OFFICE O/P EST MOD 30 MIN: CPT | Performed by: INTERNAL MEDICINE

## 2019-08-09 PROCEDURE — 36416 COLLJ CAPILLARY BLOOD SPEC: CPT | Performed by: INTERNAL MEDICINE

## 2019-08-09 NOTE — PROGRESS NOTES
Return Office Visit - Diabetes    CHIEF COMPLAINT:    Type 2 DM   Dyslipidemia    HISTORY OF PRESENT ILLNESS:   Kyrie Panda is a 38year old female who presents for follow up for diabetes.     Her son was diagnosed with brain cancer and she has been und INTO THE SKIN ONCE A WEEK. Disp: 12 each Rfl: 0   Insulin Pen Needle (CAREFINE PEN NEEDLES) 32G X 4 MM Does not apply Misc Inject three times a day.  Disp: 300 each Rfl: 0   Levonorgestrel (MIRENA, 52 MG,) 20 MCG/24HR Intrauterine IUD Mirena 20 mcg/24 hr (5 BMI: Body mass index is 43.12 kg/m².      CONSTITUTIONAL:  awake, alert, cooperative, no apparent distress,  PSYCH: normal affect  EYES:  No proptosis, no ptosis, conjunctiva normal  ENT:  Normocephalic, atraumatic  NECK:  Supple, symmetrical, trachea m MA normal.   c). Instructed on importance of annual eye exams. d). Foot exam: Daily feet exam explained  e). BG log maintainence explained in great detail (she will check Bg before meals and at bedtime), to get log and glucometer on next visit. f).  Life

## 2019-08-13 RX ORDER — METFORMIN HYDROCHLORIDE 500 MG/1
TABLET, EXTENDED RELEASE ORAL
Qty: 360 TABLET | Refills: 0 | Status: SHIPPED | OUTPATIENT
Start: 2019-08-13 | End: 2019-09-04

## 2019-08-14 RX ORDER — LISINOPRIL 5 MG/1
5 TABLET ORAL
Qty: 90 TABLET | Refills: 2 | Status: SHIPPED | OUTPATIENT
Start: 2019-08-14 | End: 2019-09-04

## 2019-09-04 ENCOUNTER — OFFICE VISIT (OUTPATIENT)
Dept: FAMILY MEDICINE CLINIC | Facility: CLINIC | Age: 44
End: 2019-09-04

## 2019-09-04 VITALS
TEMPERATURE: 99 F | DIASTOLIC BLOOD PRESSURE: 80 MMHG | SYSTOLIC BLOOD PRESSURE: 130 MMHG | HEIGHT: 63 IN | BODY MASS INDEX: 42.88 KG/M2 | WEIGHT: 242 LBS | HEART RATE: 114 BPM

## 2019-09-04 DIAGNOSIS — Z00.00 ROUTINE MEDICAL EXAM: Primary | ICD-10-CM

## 2019-09-04 DIAGNOSIS — IMO0001 UNCONTROLLED TYPE 2 DIABETES MELLITUS WITHOUT COMPLICATION, WITH LONG-TERM CURRENT USE OF INSULIN: ICD-10-CM

## 2019-09-04 DIAGNOSIS — E78.5 DYSLIPIDEMIA: ICD-10-CM

## 2019-09-04 PROCEDURE — 90471 IMMUNIZATION ADMIN: CPT | Performed by: FAMILY MEDICINE

## 2019-09-04 PROCEDURE — 99396 PREV VISIT EST AGE 40-64: CPT | Performed by: FAMILY MEDICINE

## 2019-09-04 PROCEDURE — 90732 PPSV23 VACC 2 YRS+ SUBQ/IM: CPT | Performed by: FAMILY MEDICINE

## 2019-09-04 RX ORDER — METFORMIN HYDROCHLORIDE 500 MG/1
TABLET, EXTENDED RELEASE ORAL
Qty: 360 TABLET | Refills: 4 | Status: SHIPPED | OUTPATIENT
Start: 2019-09-04 | End: 2020-09-11

## 2019-09-04 RX ORDER — ATORVASTATIN CALCIUM 40 MG/1
TABLET, FILM COATED ORAL
Qty: 90 TABLET | Refills: 4 | Status: SHIPPED | OUTPATIENT
Start: 2019-09-04 | End: 2020-09-11

## 2019-09-04 RX ORDER — TRAZODONE HYDROCHLORIDE 50 MG/1
50 TABLET ORAL NIGHTLY PRN
Qty: 30 TABLET | Refills: 1 | Status: SHIPPED | OUTPATIENT
Start: 2019-09-04 | End: 2020-04-19

## 2019-09-04 RX ORDER — LISINOPRIL 5 MG/1
5 TABLET ORAL
Qty: 90 TABLET | Refills: 4 | Status: SHIPPED | OUTPATIENT
Start: 2019-09-04 | End: 2020-09-11

## 2019-09-04 NOTE — PROGRESS NOTES
HPI:   Dar Schreiber is a 38year old female who presents for a complete physical exam.  Sees Dr. Lucrecia Avilez for diabetes on numerous medications. Working on [employer]. Trying to exercise. Just got orthotics for her foot - tendons torn.    Reports BP at home apply Misc Inject three times daily Disp: 100 each Rfl: 0   Blood Glucose Monitoring Suppl (DARON CONTOUR MONITOR) W/DEVICE Does not apply Kit by Does not apply route.  Disp:  Rfl:       Past Medical History:   Diagnosis Date   • Allergic rhinitis    • Asth denies dysuria, vaginal discharge or itching,irregular menses  MUSCULOSKELETAL: denies back pain  NEURO: denies headaches  PSYCHE: denies depression or anxiety  HEMATOLOGIC: denies hx of anemia or easy bruising  ENDOCRINE: denies weight changes  ALL/ASTHMA

## 2019-09-27 RX ORDER — DAPAGLIFLOZIN 10 MG/1
TABLET, FILM COATED ORAL
Qty: 90 TABLET | Refills: 0 | Status: SHIPPED | OUTPATIENT
Start: 2019-09-27 | End: 2020-01-06

## 2019-09-30 RX ORDER — TRAZODONE HYDROCHLORIDE 50 MG/1
TABLET ORAL
Qty: 30 TABLET | Refills: 1 | OUTPATIENT
Start: 2019-09-30

## 2019-11-06 ENCOUNTER — OFFICE VISIT (OUTPATIENT)
Dept: PODIATRY CLINIC | Facility: CLINIC | Age: 44
End: 2019-11-06

## 2019-11-06 VITALS — HEART RATE: 93 BPM | RESPIRATION RATE: 18 BRPM | SYSTOLIC BLOOD PRESSURE: 129 MMHG | DIASTOLIC BLOOD PRESSURE: 79 MMHG

## 2019-11-06 DIAGNOSIS — M67.88 ACHILLES TENDONOSIS OF LEFT LOWER EXTREMITY: ICD-10-CM

## 2019-11-06 DIAGNOSIS — M72.2 PLANTAR FASCIITIS OF RIGHT FOOT: ICD-10-CM

## 2019-11-06 DIAGNOSIS — M72.2 PLANTAR FASCIITIS OF LEFT FOOT: ICD-10-CM

## 2019-11-06 DIAGNOSIS — M76.62 ACHILLES TENDINITIS OF LEFT LOWER EXTREMITY: Primary | ICD-10-CM

## 2019-11-06 PROCEDURE — 20550 NJX 1 TENDON SHEATH/LIGAMENT: CPT | Performed by: PODIATRIST

## 2019-11-06 NOTE — PROGRESS NOTES
Per Dr. Loki White, draw up 0.5 cc of 0.5 % Marcaine and 0.5 cc of Kenalog 40 for injection to right foot. Niyah Champion

## 2019-11-08 NOTE — PROGRESS NOTES
Ian Whitmore is a 38year old female.  Patient presents with:  Orthotic Status: Bilateral foot f/u - states she is good but still has R heel pain rated as 4-6/10 with walking         HPI:   Patient returns to the clinic states she is been doing very well CONTOUR MONITOR) W/DEVICE Does not apply Kit by Does not apply route.         Past Medical History:   Diagnosis Date   • Allergic rhinitis    • Asthma     allergy induced   • Carpal tunnel syndrome    • Diabetes ([facility] [address]     diabetes for 2011   • GDM (gestatio below  GENERAL HEALTH: feels well otherwise  SKIN: denies any unusual skin lesions or rashes  RESPIRATORY: denies shortness of breath with exertion  CARDIOVASCULAR: denies chest pain on exertion  GI: denies abdominal pain and denies heartburn  NEURO: denie

## 2019-11-11 RX ORDER — TRIAMCINOLONE ACETONIDE 40 MG/ML
20 INJECTION, SUSPENSION INTRA-ARTICULAR; INTRAMUSCULAR ONCE
Status: COMPLETED | OUTPATIENT
Start: 2019-11-11 | End: 2019-11-11

## 2019-11-11 RX ADMIN — TRIAMCINOLONE ACETONIDE 20 MG: 40 INJECTION, SUSPENSION INTRA-ARTICULAR; INTRAMUSCULAR at 10:04:00

## 2019-12-18 ENCOUNTER — PATIENT MESSAGE (OUTPATIENT)
Dept: PODIATRY CLINIC | Facility: CLINIC | Age: 44
End: 2019-12-18

## 2019-12-18 ENCOUNTER — PATIENT MESSAGE (OUTPATIENT)
Dept: FAMILY MEDICINE CLINIC | Facility: CLINIC | Age: 44
End: 2019-12-18

## 2019-12-18 DIAGNOSIS — M79.673 HEEL PAIN, UNSPECIFIED LATERALITY: Primary | ICD-10-CM

## 2019-12-18 NOTE — TELEPHONE ENCOUNTER
Please have the patient get a referral to be seen sounds like she has a plantar fascia tear which will most likely require an MRI.

## 2019-12-18 NOTE — TELEPHONE ENCOUNTER
From: Santiago Rivera  To: Daryn Felton DPM  Sent: 12/18/2019 9:00 AM CST  Subject: Non-Urgent Medical Question    Good morning,  I was doing ok with my right heel pain after the shot - not totally gone, but tolerable.  Last week, I turned with my veronica

## 2019-12-18 NOTE — TELEPHONE ENCOUNTER
From: Manuela Gordon  To: Baylee Robbins MD  Sent: 12/18/2019 2:02 PM CST  Subject: Referral Request    I am having right heel pain. I recently had a shot from Dr. Alcides Alfonso but then think I reinjured the heel.  He would like to see me for a follow up but

## 2019-12-23 NOTE — TELEPHONE ENCOUNTER
LOV 08/09/2019  F/U 02/11/2020 We are committed to providing you with the best care possible. In order to help us achieve these goals please remember to bring all medications, herbal products, and over the counter supplements with you to each visit. If your provider has ordered testing for you, please be sure to follow up with our office if you have not received results within 7 days after the testing took place. *If you receive a survey after visiting one of our offices, please take time to share your experience concerning your physician office visit. These surveys are confidential and no health information about you is shared. We are eager to improve for you and we are counting on your feedback to help make that happen. Development   Most infants are still not sleeping through the night.  Babies will have crossed eyes when they are not focusing on objects. This is normal.   Fussy periods should be diminishing and are usually gone by 3 months-of-age.  Spitting up in small amounts after feedings is common. To avoid this, burp frequently and leave your child in an upright position for 15-30 minutes after feeding.  Your infant may quiet himself with sucking his fingers or a pacifier.  Your baby should be able to:   o Gurgle, , and smile  o Lift her head for a few seconds when lying on her stomach  o Move his legs and arms vigorously  o Follow a slow moving object with his eyes   Speak gently and soothingly--babies are easily scared of loud and deep sounds and voices.  May begin sucking motions at the sight of the breast or bottle.  Infants of this age often study their own hand movements.  Tummy time is recommended beginning at this age. o A few minutes of tummy time several times a day will help develop arm, neck, and trunk strength.  o Babies typically do not like tummy time, but it is an important exercise that allows them to develop motor skills faster.     o Without tummy time, overall motor development is delayed (see toy section below). Diet   Your baby should continue on breast milk or formula feedings. He should take about four ounces every 3-4 hours.  Always hold your baby when feeding. This helps to teach babies that you are there to meet his needs and helps to develop emotional bonding.  No cereal or solid foods are recommended until 3months of age--no matter what grandma, great grandma, or great-great grandma says. o Research over the past few years has shown that feeding such things before 4 months-of-age increases the risk of food allergies or other problems, such as constipation.  Your doctor, however, may recommend one or more of these if needed, but only he/she can determine whether the risks of starting these foods too early outweighs the potential benefits.  Juice should only be given if recommended by your pediatrician.  o Juice is good for helping relieve constipation, but it has very little use otherwise. o Even when diluted, the sugar in juice can contribute to tooth decay. o Training children to want sweet foods and drinks begins in infancy. Sugary drinks such as soft drinks, Benjamin-Aid, etc. are among the most common contributors to childhood obesity. o Avoiding excessive sugar now helps to avoid big problems later on.  Remember, no honey until 1 year of age. Botulism is a very nasty, often fatal problem. Hygiene   Use a mild soap such as [employer] or [employer]iExpert, or [facility] for your baby's body. Wash the face with water only.  Gently scrub baby's hair and scalp with baby shampoo.  Baby lotion may be used on the skin if it is excessively dry, but avoid the face and scalp.  Do not put Q-tips into the ear canal.  Wax will melt and collect at the opening to the ear canal.  This can be easily cleaned with safety Q-tips or a washcloth. Safety   Never leave your baby alone, except in a crib.    Never take your child in any car unless he is properly restrained in an infant car seat. The infant should continue to face rearward. Always restrain your baby in an appropriate infant car seat. (Besides being common sense, IT'S THE LAW!). Remember this applies to when riding in someone else's car.  Infants become more active in the next 2 months and may begin to roll over soon. Never leave your infant on a surface (including a bed) from which he could fall.  Remember, NO smoking in the house with a baby. This includes in a separate room with the door closed. o When smoking outside, wear an extra jacket or shirt and take this shirt off once back in the house. Smoke that has absorbed into clothing will be breathed in by the baby and is just as harmful as smoke traveling through the air.  Never prop a bottle or give a bottle in bed. This can lead to ear infections and tooth decay.  Never leave your baby unattended in the tub, even for an instant!  Never eat, drink, or carry anything hot near your baby.  To protect your child from scalds, reduce the temperature of your hot water heater to 120 oF; avoid holding your infant while cooking, smoking, or drinking hot liquids.  Install smoke detectors.  Do not put an infant seat on anything but the floor when the baby is in the seat. Stimulation   Infants enjoy looking at mirrors, pictures of faces and bright colors.  When your baby is awake, position him so that he can watch what you're doing. [facility] Babies also love to be sung and talked to while being cuddled. It is not too early to start reading to your child. Toys   Ring rattles or rattles with handles are good choices, especially those with faces with moving eyes.  Squeeze toys that are soft and easy to squeak will help your baby practice grasping motion and improve his idea of cause and effect connections.  Small plastic blocks, bright bath toys and smooth edged, unbreakable mirrors are favorites at this age.    Toys should be unbreakable, contain no small detachable parts or sharp edges, and should not be easy to swallow. Normal Development  Between 2 and 4 months-of-age     Daily Activities   Crying gradually becomes less frequent   Displays greater variety of emotions:  distress, excitement, and delight   May begin to sleep through the night (but not necessarily)   Smiles, gurgles, coos, and squeals, especially when talked to  [address] [place] [address] more distress when an adult leaves   Quiets down when held or talked to  [facility] conceive of an objects existence if it cannot be sensed (seen, heard)   Begin drooling at an extraordinary rate.   o This is not due to teething, but the natural functioning of the saliva glands. o Since babies also discover their hands and suck and chew on them, it appears that they are teething.    o Teething typically does not begin, in earnest, until 6 months-of-age. Vision  [employer] better, but still no further than about 12 inches   Follows objects by moving head from side to side   Prefers brightly colored objects   Loves lights and ceiling fans  Hearing   Knows the differences between male and female voices; tends to prefer female voices. Knows the difference between angry and friendly voices   There is a high potential for injuries with infant walkers and they are not recommended. Stationary exercise stations and independent jumpers (not suspended from doorways) are okay. Acceptable examples include:  Exer-saucers and Jumperoos. o These help improve lower body strength  o Remember--you also need to build upper body and trunk strength. This is best done with tummy time. o Failure to equalize upper body/trunk and lower body strength may result in a delay in overall muscle/motor development.   Motor Skills    Movements become increasingly smoother   Lifts chest momentarily when lying on tummy   Holds head steady when held or seated with support   Discovers hands and fingers (and wants to gnaw on them)   Grasps with more control   May bat at dangling objects with entire body    Remember that each child is unique. The developmental milestones described above are approximations. There is a wide spectrum of growth and development for each age and therefore certain milestones may occur sooner while others develop later. Many different factors determine a childs development. Temperament is one factor that greatly affects how quickly or slowly a baby may attain milestones. Laid-back babies are content to experience the world passively and may not develop motor skills as quickly as a more active infant. However, the laid-back baby may develop sensory skills and language faster than more active and aggressive infants. It is inappropriate to compare different babies for this reason (although family members, friends, and even parents have the tendency to do this). Just remember that your baby is different from all other babies. No two babies will do the same things and the same time. This is even true with identical twins. Although they share the same genetic make-up, their temperaments and developing personalities are different and therefore their development will not mirror each other. If you have concerns regarding your babys development, check with your pediatrician.

## 2019-12-30 RX ORDER — EXENATIDE 2 MG/.65ML
INJECTION, SUSPENSION, EXTENDED RELEASE SUBCUTANEOUS
Qty: 12 EACH | Refills: 0 | Status: SHIPPED | OUTPATIENT
Start: 2019-12-30 | End: 2020-08-21

## 2020-01-06 RX ORDER — DAPAGLIFLOZIN 10 MG/1
TABLET, FILM COATED ORAL
Qty: 90 TABLET | Refills: 0 | Status: SHIPPED | OUTPATIENT
Start: 2020-01-06 | End: 2020-03-16

## 2020-01-22 ENCOUNTER — OFFICE VISIT (OUTPATIENT)
Dept: INTERNAL MEDICINE CLINIC | Facility: CLINIC | Age: 45
End: 2020-01-22

## 2020-01-22 ENCOUNTER — HOSPITAL ENCOUNTER (OUTPATIENT)
Dept: GENERAL RADIOLOGY | Facility: HOSPITAL | Age: 45
Discharge: HOME | End: 2020-01-22
Attending: INTERNAL MEDICINE
Payer: COMMERCIAL

## 2020-01-22 VITALS
SYSTOLIC BLOOD PRESSURE: 123 MMHG | BODY MASS INDEX: 42.7 KG/M2 | DIASTOLIC BLOOD PRESSURE: 79 MMHG | HEIGHT: 63 IN | HEART RATE: 106 BPM | WEIGHT: 241 LBS | RESPIRATION RATE: 18 BRPM

## 2020-01-22 DIAGNOSIS — M79.672 LEFT FOOT PAIN: Primary | ICD-10-CM

## 2020-01-22 DIAGNOSIS — M79.672 LEFT FOOT PAIN: ICD-10-CM

## 2020-01-22 PROCEDURE — 73630 X-RAY EXAM OF FOOT: CPT | Performed by: INTERNAL MEDICINE

## 2020-01-22 PROCEDURE — 99213 OFFICE O/P EST LOW 20 MIN: CPT | Performed by: INTERNAL MEDICINE

## 2020-01-22 NOTE — PATIENT INSTRUCTIONS
Await results of left foot x-ray. Please rest and elevate your left foot, and apply ice 2-3 times daily. Take ibuprofen as needed for pain relief. Wearing an Ace wrap may also help. Call if no better.

## 2020-01-22 NOTE — PROGRESS NOTES
Brittnee Cobb is a 38year old female. Patient presents with: Foot Pain: left x 1 wk    HPI:   She injured her left foot while exercising at [employer] class about 1 week ago.   Since that time she has had persistent pain on the dorsal aspect of her left foot % External Cream Apply topically 2 (two) times daily as needed.  45 g 3       Cat Dander [Dander]       Iodine (Topical)          Seasonal                  Nickel                  RASH   Past Medical History:   Diagnosis Date   • Allergic rhinitis    • Asth localizing tenderness      ASSESSMENT AND PLAN:   1. Left foot pain  Likely sprain. Rule out fracture. X-ray left foot today. Order sent. Recommend rest, foot elevation, ice application 2-3 times daily. Also recommend ibuprofen and Ace wrap as needed.

## 2020-03-02 ENCOUNTER — PATIENT MESSAGE (OUTPATIENT)
Dept: FAMILY MEDICINE CLINIC | Facility: CLINIC | Age: 45
End: 2020-03-02

## 2020-03-02 DIAGNOSIS — E11.65 UNCONTROLLED TYPE 2 DIABETES MELLITUS WITH HYPERGLYCEMIA (HCC): Primary | ICD-10-CM

## 2020-03-03 NOTE — TELEPHONE ENCOUNTER
From: Krystal Ca  To: Iker Serrano MD  Sent: 3/2/2020 3:28 PM CST  Subject: Referral Request    I need a referral to follow up with Dr. Lizette Hartley. My referral  a few days before my appointment so I had to reschedule for May. Thank you!

## 2020-03-04 NOTE — TELEPHONE ENCOUNTER
Patient acknowledged letty    RE: Referral Request   Message [phone]   From  Shannon Correa, [address] To  [facility] and Delivered  3/3/2020  2:22 PM   Last Read in [address]  3/3/2020  4:11 PM by Arun Bailon

## 2020-03-16 RX ORDER — DAPAGLIFLOZIN 10 MG/1
TABLET, FILM COATED ORAL
Qty: 90 TABLET | Refills: 0 | Status: SHIPPED | OUTPATIENT
Start: 2020-03-16 | End: 2020-06-11

## 2020-04-19 RX ORDER — TRAZODONE HYDROCHLORIDE 50 MG/1
TABLET ORAL
Qty: 30 TABLET | Refills: 1 | Status: SHIPPED | OUTPATIENT
Start: 2020-04-19 | End: 2021-01-24

## 2020-04-21 ENCOUNTER — TELEPHONE (OUTPATIENT)
Dept: ENDOCRINOLOGY CLINIC | Facility: CLINIC | Age: 45
End: 2020-04-21

## 2020-04-21 NOTE — TELEPHONE ENCOUNTER
LOV 8/9/19 with Dr. Prtaibha Frias; RTC in 3 months with provider. No follow up. A1C: 8.4% on 8/9/19     Patient needs an in-person f/u with a provider.  Please help patient schedule an apt with me on any of the following days: 5/4, 5/7, 5/8, or 5/13     Th

## 2020-04-23 NOTE — TELEPHONE ENCOUNTER
Future Appointments   Date Time Provider Salvador Duran   5/4/2020  2:30 PM Justyna Day, APRN [facility] [address]     Patient was contacted and relayed below message.   States she had to cancel last appointment because she didn't have a referral. She sta

## 2020-05-06 ENCOUNTER — TELEPHONE (OUTPATIENT)
Dept: FAMILY MEDICINE CLINIC | Facility: CLINIC | Age: 45
End: 2020-05-06

## 2020-05-06 NOTE — TELEPHONE ENCOUNTER
Current Outpatient Medications   Medication Sig Dispense Refill   • TRAZODONE HCL 50 MG Oral Tab TAKE 1 TABLET BY MOUTH NIGHTLY AS NEEDED FOR SLEEP 30 tablet 1

## 2020-05-21 ENCOUNTER — OFFICE VISIT (OUTPATIENT)
Dept: ENDOCRINOLOGY CLINIC | Facility: CLINIC | Age: 45
End: 2020-05-21

## 2020-05-21 ENCOUNTER — TELEPHONE (OUTPATIENT)
Dept: ENDOCRINOLOGY CLINIC | Facility: CLINIC | Age: 45
End: 2020-05-21

## 2020-05-21 VITALS
TEMPERATURE: 98 F | SYSTOLIC BLOOD PRESSURE: 124 MMHG | DIASTOLIC BLOOD PRESSURE: 73 MMHG | HEART RATE: 111 BPM | BODY MASS INDEX: 43 KG/M2 | WEIGHT: 244.63 LBS

## 2020-05-21 DIAGNOSIS — E11.9 TYPE 2 DIABETES MELLITUS WITHOUT COMPLICATION, WITH LONG-TERM CURRENT USE OF INSULIN (HCC): Primary | ICD-10-CM

## 2020-05-21 DIAGNOSIS — Z79.4 TYPE 2 DIABETES MELLITUS WITHOUT COMPLICATION, WITH LONG-TERM CURRENT USE OF INSULIN (HCC): Primary | ICD-10-CM

## 2020-05-21 DIAGNOSIS — E78.5 DYSLIPIDEMIA: ICD-10-CM

## 2020-05-21 PROCEDURE — 82962 GLUCOSE BLOOD TEST: CPT | Performed by: NURSE PRACTITIONER

## 2020-05-21 PROCEDURE — 83036 HEMOGLOBIN GLYCOSYLATED A1C: CPT | Performed by: NURSE PRACTITIONER

## 2020-05-21 PROCEDURE — 3078F DIAST BP <80 MM HG: CPT | Performed by: NURSE PRACTITIONER

## 2020-05-21 PROCEDURE — 99214 OFFICE O/P EST MOD 30 MIN: CPT | Performed by: NURSE PRACTITIONER

## 2020-05-21 PROCEDURE — 36416 COLLJ CAPILLARY BLOOD SPEC: CPT | Performed by: NURSE PRACTITIONER

## 2020-05-21 PROCEDURE — 3074F SYST BP LT 130 MM HG: CPT | Performed by: NURSE PRACTITIONER

## 2020-05-21 NOTE — PROGRESS NOTES
Name: Yudelka Birmingham  Date: 5/21/2020    Referring Physician: Doyle Geiger    CHIEF COMPLAINT   No chief complaint on file. HISTORY OF PRESENT ILLNESS   Yudelka Birmingham is a 38year old female who presents for follow up on diabetes management.  In th tomatoes,   Dinner 5-7pm: chicken, shrimp and veggies, grilled food sometimes  Before bed snack: cheese and crackers, sometimes chips and salsa.   + diet soda, +sweets on occasion.   -fruit juice, -milk,      EXERCISE:   No     Polyuria, polyphagia, polydi Oral Tab, TAKE ONE TABLET BY MOUTH NIGHTLY, Disp: 90 tablet, Rfl: 4  •  Insulin Pen Needle (CAREFINE PEN NEEDLES) 32G X 4 MM Does not apply Misc, Inject three times a day., Disp: 300 each, Rfl: 0  •  Levonorgestrel (MIRENA, 52 MG,) 20 MCG/24HR Intrauterine Seasonal allergies    • Shoulder tendonitis    • Type II or unspecified type diabetes mellitus without mention of complication, uncontrolled 2/7/2014     PAST SURGICAL HISTORY:   Past Surgical History:   Procedure Laterality Date   • ADENOIDECTOMY  1980 therapy.   -Discussed with patient glucose targets ranges (Fasting  and post prandial <180).      1.Type 2 Diabetes Mellitus without complications on  long-term insulin   -LAB DATA  HbA,C: 7.2% today  a) Medications  -Metformin XR  2,000mg daily with sooner if she develops blood sugar readings <70 or has readings persistently >250. The risks and benefits of my recommendations, as well as other treatment options were discussed with the patient today.  Questions were also answered to the best of my kn

## 2020-05-21 NOTE — PATIENT INSTRUCTIONS
Your A1C is 7.2% today!  Great job!!     Continue with Metformin XR  2,000mg daily with breakfast   Continue with U500 120 units with Breakfast, 120 units with lunch and 120 units with dinner            -> start using thigh areas as sites for insulin admini

## 2020-05-21 NOTE — TELEPHONE ENCOUNTER
Patient called back;     AVS reviewed. Patient verbalized understanding, denied any questions and was in agreement with plan.      Thank you

## 2020-06-11 RX ORDER — DAPAGLIFLOZIN 10 MG/1
TABLET, FILM COATED ORAL
Qty: 90 TABLET | Refills: 0 | Status: SHIPPED | OUTPATIENT
Start: 2020-06-11 | End: 2020-09-09

## 2020-07-22 ENCOUNTER — NURSE TRIAGE (OUTPATIENT)
Dept: FAMILY MEDICINE CLINIC | Facility: CLINIC | Age: 45
End: 2020-07-22

## 2020-07-22 NOTE — TELEPHONE ENCOUNTER
Please triage this patient, MyUnfoldt message sent         ----- Message from Tika Keys sent at 7/22/2020  5:46 PM CDT -----  Regarding: Non-Urgent Medical Question  Contact: [phone]  Hello - not sure what I have going on, but I have had a very i

## 2020-07-23 ENCOUNTER — OFFICE VISIT (OUTPATIENT)
Dept: INTERNAL MEDICINE CLINIC | Facility: CLINIC | Age: 45
End: 2020-07-23

## 2020-07-23 VITALS
DIASTOLIC BLOOD PRESSURE: 77 MMHG | HEIGHT: 63 IN | HEART RATE: 90 BPM | SYSTOLIC BLOOD PRESSURE: 110 MMHG | BODY MASS INDEX: 42.52 KG/M2 | WEIGHT: 240 LBS

## 2020-07-23 DIAGNOSIS — R21 RASH AND NONSPECIFIC SKIN ERUPTION: Primary | ICD-10-CM

## 2020-07-23 PROCEDURE — 3008F BODY MASS INDEX DOCD: CPT | Performed by: INTERNAL MEDICINE

## 2020-07-23 PROCEDURE — 3078F DIAST BP <80 MM HG: CPT | Performed by: INTERNAL MEDICINE

## 2020-07-23 PROCEDURE — 99213 OFFICE O/P EST LOW 20 MIN: CPT | Performed by: INTERNAL MEDICINE

## 2020-07-23 PROCEDURE — 3074F SYST BP LT 130 MM HG: CPT | Performed by: INTERNAL MEDICINE

## 2020-07-23 RX ORDER — PREDNISONE 20 MG/1
TABLET ORAL
Qty: 6 TABLET | Refills: 0 | Status: SHIPPED | OUTPATIENT
Start: 2020-07-23 | End: 2020-07-28

## 2020-07-23 RX ORDER — LEVOCETIRIZINE DIHYDROCHLORIDE 5 MG/1
5 TABLET, FILM COATED ORAL EVERY EVENING
Qty: 10 TABLET | Refills: 0 | Status: SHIPPED | OUTPATIENT
Start: 2020-07-23 | End: 2020-08-02

## 2020-07-23 NOTE — PROGRESS NOTES
Keyonna Price is a 41year old female. Patient presents with:  Rash: rash on arms, neck, legs, very itchy, onset 2 weeks ago, getting worse    HPI:   52-year-old female with a past medical history of diabetes here for evaluation of rash.   She reports that Inject three times daily 100 each 0   • Blood Glucose Monitoring Suppl ([employer] CONTOUR MONITOR) W/DEVICE Does not apply Kit by Does not apply route. • triamcinolone acetonide 0.1 % External Cream Apply topically 2 (two) times daily as needed.  (Patient n and fever. HENT: Negative for congestion and voice change. Respiratory: Negative for cough and shortness of breath. Cardiovascular: Negative for chest pain. Gastrointestinal: Negative for vomiting, abdominal pain and abdominal distention.    Genit

## 2020-07-23 NOTE — TELEPHONE ENCOUNTER
Action Requested: Summary for Provider     []  Critical Lab, Recommendations Needed  [] Need Additional Advice  []   FYI    []   Need Orders  [] Need Medications Sent to Pharmacy  []  Other     SUMMARY: Per protocol advised office visit today and pt schedu

## 2020-07-28 ENCOUNTER — OFFICE VISIT (OUTPATIENT)
Dept: FAMILY MEDICINE CLINIC | Facility: CLINIC | Age: 45
End: 2020-07-28

## 2020-07-28 ENCOUNTER — NURSE TRIAGE (OUTPATIENT)
Dept: FAMILY MEDICINE CLINIC | Facility: CLINIC | Age: 45
End: 2020-07-28

## 2020-07-28 ENCOUNTER — HOSPITAL ENCOUNTER (OUTPATIENT)
Dept: GENERAL RADIOLOGY | Age: 45
Discharge: HOME | End: 2020-07-28
Attending: FAMILY MEDICINE
Payer: COMMERCIAL

## 2020-07-28 VITALS
HEIGHT: 63 IN | BODY MASS INDEX: 42.7 KG/M2 | DIASTOLIC BLOOD PRESSURE: 80 MMHG | SYSTOLIC BLOOD PRESSURE: 118 MMHG | TEMPERATURE: 98 F | HEART RATE: 98 BPM | WEIGHT: 241 LBS

## 2020-07-28 DIAGNOSIS — S93.105D DISLOCATION OF PHALANX OF LEFT FOOT, SUBSEQUENT ENCOUNTER: ICD-10-CM

## 2020-07-28 DIAGNOSIS — S99.922A INJURY OF TOE ON LEFT FOOT, INITIAL ENCOUNTER: Primary | ICD-10-CM

## 2020-07-28 DIAGNOSIS — S99.922A INJURY OF TOE ON LEFT FOOT, INITIAL ENCOUNTER: ICD-10-CM

## 2020-07-28 PROCEDURE — L3260 AMBULATORY SURGICAL BOOT EAC: HCPCS | Performed by: FAMILY MEDICINE

## 2020-07-28 PROCEDURE — 3074F SYST BP LT 130 MM HG: CPT | Performed by: FAMILY MEDICINE

## 2020-07-28 PROCEDURE — 3008F BODY MASS INDEX DOCD: CPT | Performed by: FAMILY MEDICINE

## 2020-07-28 PROCEDURE — 3079F DIAST BP 80-89 MM HG: CPT | Performed by: FAMILY MEDICINE

## 2020-07-28 PROCEDURE — 99213 OFFICE O/P EST LOW 20 MIN: CPT | Performed by: FAMILY MEDICINE

## 2020-07-28 PROCEDURE — 73660 X-RAY EXAM OF TOE(S): CPT | Performed by: FAMILY MEDICINE

## 2020-07-28 NOTE — PROGRESS NOTES
Caren Bourgeois is a 41year old female. Patient presents with: Toe Injury: left pinky toe    HPI:   3 days ago rammed into son's foot.  Left toe hurting still   Levocetirizine Dihydrochloride 5 MG Oral Tab, Take 1 tablet (5 mg total) by mouth every evenin 2011   • GDM (gestational diabetes mellitus)    • High blood pressure    • High cholesterol 2/7/2014   • HORMONE DISORDER     PCOS   • Infertility female    • Obesity    • Other and unspecified hyperlipidemia 2/7/2014   • PCOS (polycystic ovarian syndrome)

## 2020-07-28 NOTE — TELEPHONE ENCOUNTER
Action Requested: Summary for Provider     []  Critical Lab, Recommendations Needed  [] Need Additional Advice  []   FYI    []   Need Orders  [] Need Medications Sent to Pharmacy  []  Other     SUMMARY:    Appointment made for today 7/28/20  Chacho dumont

## 2020-07-29 ENCOUNTER — TELEPHONE (OUTPATIENT)
Dept: PODIATRY CLINIC | Facility: CLINIC | Age: 45
End: 2020-07-29

## 2020-07-29 ENCOUNTER — TELEPHONE (OUTPATIENT)
Dept: FAMILY MEDICINE CLINIC | Facility: CLINIC | Age: 45
End: 2020-07-29

## 2020-07-29 ENCOUNTER — OFFICE VISIT (OUTPATIENT)
Dept: PODIATRY CLINIC | Facility: CLINIC | Age: 45
End: 2020-07-29

## 2020-07-29 ENCOUNTER — HOSPITAL ENCOUNTER (OUTPATIENT)
Dept: GENERAL RADIOLOGY | Facility: HOSPITAL | Age: 45
Discharge: HOME | End: 2020-07-29
Attending: PODIATRIST
Payer: COMMERCIAL

## 2020-07-29 VITALS — SYSTOLIC BLOOD PRESSURE: 140 MMHG | DIASTOLIC BLOOD PRESSURE: 79 MMHG | HEART RATE: 103 BPM

## 2020-07-29 DIAGNOSIS — T14.8XXA FX: Primary | ICD-10-CM

## 2020-07-29 DIAGNOSIS — S93.105A DISLOCATION OF PHALANX OF LEFT FOOT, INITIAL ENCOUNTER: ICD-10-CM

## 2020-07-29 DIAGNOSIS — T14.8XXA FX: ICD-10-CM

## 2020-07-29 PROCEDURE — 3078F DIAST BP <80 MM HG: CPT | Performed by: PODIATRIST

## 2020-07-29 PROCEDURE — 73630 X-RAY EXAM OF FOOT: CPT | Performed by: PODIATRIST

## 2020-07-29 PROCEDURE — 99213 OFFICE O/P EST LOW 20 MIN: CPT | Performed by: PODIATRIST

## 2020-07-29 PROCEDURE — 3077F SYST BP >= 140 MM HG: CPT | Performed by: PODIATRIST

## 2020-07-29 NOTE — TELEPHONE ENCOUNTER
Pt states she received Dr Topher Alfaro my chart message and asking if she can see Podiatry today. Spoke with Podiatry office and Dr Dane Negro is able to see pt today at [facility] at 2:50. Called and left detailed message regarding appointment with Podiatry.       Re

## 2020-07-29 NOTE — PROGRESS NOTES
John Mensah - Your small toe is actually not fractured but dislocated. I am sending you to podiatry to see if able to fix with office maneuver.  I have sent a message to see if able to get you in tomorrow with first available doctor.  - Dr. Noel Orozoc

## 2020-07-29 NOTE — PROGRESS NOTES
HPI:    Patient ID: Jyoti Louise is a 41year old female. 46-year-old female presents as a new patient to me on consult from [facility]. She injured her left fifth toe approximately 4 to 5 days ago.   Week because of continued discomfort and pain she had Suppl (DARON CONTOUR MONITOR) W/DEVICE Does not apply Kit by Does not apply route.        Allergies:  Cat Dander [Dander]       Iodine (Topical)          Seasonal                  Nickel                  RASH   PHYSICAL EXAM:     On physical exam pulses are

## 2020-08-04 ENCOUNTER — HOSPITAL ENCOUNTER (OUTPATIENT)
Dept: MAMMOGRAPHY | Facility: HOSPITAL | Age: 45
Discharge: HOME | End: 2020-08-04
Attending: OBSTETRICS & GYNECOLOGY
Payer: COMMERCIAL

## 2020-08-04 DIAGNOSIS — Z12.31 ENCOUNTER FOR SCREENING MAMMOGRAM FOR MALIGNANT NEOPLASM OF BREAST: ICD-10-CM

## 2020-08-04 PROCEDURE — 77063 BREAST TOMOSYNTHESIS BI: CPT | Performed by: OBSTETRICS & GYNECOLOGY

## 2020-08-04 PROCEDURE — 77067 SCR MAMMO BI INCL CAD: CPT | Performed by: OBSTETRICS & GYNECOLOGY

## 2020-08-19 ENCOUNTER — OFFICE VISIT (OUTPATIENT)
Dept: PODIATRY CLINIC | Facility: CLINIC | Age: 45
End: 2020-08-19

## 2020-08-19 DIAGNOSIS — S93.105D DISLOCATION OF PHALANX OF LEFT FOOT, SUBSEQUENT ENCOUNTER: Primary | ICD-10-CM

## 2020-08-19 PROCEDURE — 99212 OFFICE O/P EST SF 10 MIN: CPT | Performed by: PODIATRIST

## 2020-08-19 NOTE — PROGRESS NOTES
HPI:    Patient ID: Viki Rios is a 41year old female. 81-year-old female presents for follow-up in reference to the dislocation of the fifth toe left foot.   She has some minor discomforts now she is not aware of swelling or redness and has no feeli irritations on this fifth toe. Weightbearing demonstrates a rectus and normal position of the digit. Minor palpable discomfort is noted I see no evidence of concern. I expect over the course of the month that the remaining symptoms resolve.   Follow-up o

## 2020-08-21 RX ORDER — EXENATIDE 2 MG/.65ML
INJECTION, SUSPENSION, EXTENDED RELEASE SUBCUTANEOUS
Qty: 12 EACH | Refills: 0 | Status: SHIPPED | OUTPATIENT
Start: 2020-08-21 | End: 2020-12-08

## 2020-09-09 RX ORDER — DAPAGLIFLOZIN 10 MG/1
TABLET, FILM COATED ORAL
Qty: 90 TABLET | Refills: 0 | Status: SHIPPED | OUTPATIENT
Start: 2020-09-09 | End: 2020-12-15

## 2020-09-11 RX ORDER — METFORMIN HYDROCHLORIDE 500 MG/1
TABLET, EXTENDED RELEASE ORAL
Qty: 360 TABLET | Refills: 4 | Status: SHIPPED | OUTPATIENT
Start: 2020-09-11 | End: 2021-12-09

## 2020-09-11 RX ORDER — ATORVASTATIN CALCIUM 40 MG/1
TABLET, FILM COATED ORAL
Qty: 90 TABLET | Refills: 4 | Status: SHIPPED | OUTPATIENT
Start: 2020-09-11 | End: 2021-12-09

## 2020-09-11 RX ORDER — LISINOPRIL 5 MG/1
TABLET ORAL
Qty: 90 TABLET | Refills: 4 | Status: SHIPPED | OUTPATIENT
Start: 2020-09-11 | End: 2021-08-06

## 2020-10-02 ENCOUNTER — OFFICE VISIT (OUTPATIENT)
Dept: ENDOCRINOLOGY CLINIC | Facility: CLINIC | Age: 45
End: 2020-10-02

## 2020-10-02 VITALS
BODY MASS INDEX: 43.23 KG/M2 | WEIGHT: 244 LBS | HEART RATE: 91 BPM | SYSTOLIC BLOOD PRESSURE: 116 MMHG | DIASTOLIC BLOOD PRESSURE: 80 MMHG | HEIGHT: 63 IN

## 2020-10-02 DIAGNOSIS — Z79.4 TYPE 2 DIABETES MELLITUS WITH HYPERGLYCEMIA, WITH LONG-TERM CURRENT USE OF INSULIN (HCC): Primary | ICD-10-CM

## 2020-10-02 DIAGNOSIS — E11.65 TYPE 2 DIABETES MELLITUS WITH HYPERGLYCEMIA, WITH LONG-TERM CURRENT USE OF INSULIN (HCC): Primary | ICD-10-CM

## 2020-10-02 PROCEDURE — 36416 COLLJ CAPILLARY BLOOD SPEC: CPT | Performed by: NURSE PRACTITIONER

## 2020-10-02 PROCEDURE — 3074F SYST BP LT 130 MM HG: CPT | Performed by: NURSE PRACTITIONER

## 2020-10-02 PROCEDURE — 82962 GLUCOSE BLOOD TEST: CPT | Performed by: NURSE PRACTITIONER

## 2020-10-02 PROCEDURE — 3079F DIAST BP 80-89 MM HG: CPT | Performed by: NURSE PRACTITIONER

## 2020-10-02 PROCEDURE — 99213 OFFICE O/P EST LOW 20 MIN: CPT | Performed by: NURSE PRACTITIONER

## 2020-10-02 PROCEDURE — 3008F BODY MASS INDEX DOCD: CPT | Performed by: NURSE PRACTITIONER

## 2020-10-02 PROCEDURE — 83036 HEMOGLOBIN GLYCOSYLATED A1C: CPT | Performed by: NURSE PRACTITIONER

## 2020-10-02 NOTE — PATIENT INSTRUCTIONS
Your A1C: 7.3% today --> 7.2% on 5/21/2020. The main goal of diabetes treatment is to keep your sugar from going too high.  We measure your overall blood sugar trends with a Hemoglobin A1C test. (also called an A1C)  For most people the target is less t glucose anyway. 2. Take 15 grams of carbohydrate (carb). Here are some choices:  4 oz. regular fruit juice  3-4 glucose tablets  6 oz. regular soda   7-8 jelly beans  3. Recheck blood glucose after 10-15 minutes.  If blood glucose is still low (less than 7

## 2020-10-02 NOTE — PROGRESS NOTES
Name: Salma Kirby  Date: 10/2/2020    Referring Physician: Cheyenne Polanco    CHIEF COMPLAINT   No chief complaint on file. HISTORY OF PRESENT ILLNESS   Salma Kirby is a 41year old female who presents for follow up on diabetes management.  In th COMPLIANCE:  Good; follows low carbohydrate diet.; eats 3 meals per day.      EXERCISE:   No     Polyuria, polyphagia, polydipsia: no  Paresthesias: no  Blurred vision: no  Recent steroids, illness or infections: no     REVIEW OF SYSTEMS  Constitutional: Ne times a day., Disp: 300 each, Rfl: 0  •  Levonorgestrel (MIRENA, 52 MG,) 20 MCG/24HR Intrauterine IUD, Mirena 20 mcg/24 hr (5 years) intrauterine device  Take by intrauterine route., Disp: , Rfl:   •  Glucose Blood In Vitro Strip, Check sugars four times a    •      • CARPAL TUNNEL RELEASE Right    • LAPAROSCOPIC APPENDECTOMY  2/27/15   • MYOMECTOMY HYSTEROSCOPIC N/A 2017    Performed by Jaqueline Casas MD at [facility] OR   • OTHER      carpal tunnel release   • OTHER      d& insulin   LAB DATA  HbA,C: 7.3% today --> stable from 7.2% on 5/21/2020  a) Medications  -continue with Metformin XR  2,000mg daily with breakfast   -increase U500 120 units Breakfast, 120 unit with lunch and 120 -->125 unit dinner   -continue with [facility] readings persistently >250. The risks and benefits of my recommendations were discussed with the patient today. Questions were also answered to the best of my knowledge. Patient verbalizes understanding of these issues and agrees to the plan.       10/2

## 2020-10-10 ENCOUNTER — LAB ENCOUNTER (OUTPATIENT)
Dept: LAB | Facility: HOSPITAL | Age: 45
End: 2020-10-10
Attending: NURSE PRACTITIONER
Payer: COMMERCIAL

## 2020-10-10 DIAGNOSIS — E11.9 TYPE 2 DIABETES MELLITUS WITHOUT COMPLICATION, WITH LONG-TERM CURRENT USE OF INSULIN (HCC): ICD-10-CM

## 2020-10-10 DIAGNOSIS — E78.5 DYSLIPIDEMIA: ICD-10-CM

## 2020-10-10 DIAGNOSIS — Z79.4 TYPE 2 DIABETES MELLITUS WITHOUT COMPLICATION, WITH LONG-TERM CURRENT USE OF INSULIN (HCC): ICD-10-CM

## 2020-10-10 PROCEDURE — 36415 COLL VENOUS BLD VENIPUNCTURE: CPT

## 2020-10-10 PROCEDURE — 80061 LIPID PANEL: CPT

## 2020-10-10 PROCEDURE — 80053 COMPREHEN METABOLIC PANEL: CPT

## 2020-10-10 PROCEDURE — 82570 ASSAY OF URINE CREATININE: CPT

## 2020-10-10 PROCEDURE — 82043 UR ALBUMIN QUANTITATIVE: CPT

## 2020-10-12 ENCOUNTER — IMMUNIZATION (OUTPATIENT)
Dept: INTERNAL MEDICINE CLINIC | Facility: CLINIC | Age: 45
End: 2020-10-12

## 2020-10-12 ENCOUNTER — TELEPHONE (OUTPATIENT)
Dept: INTERNAL MEDICINE CLINIC | Facility: CLINIC | Age: 45
End: 2020-10-12

## 2020-10-12 DIAGNOSIS — Z23 NEED FOR VACCINATION: ICD-10-CM

## 2020-10-12 PROCEDURE — 90686 IIV4 VACC NO PRSV 0.5 ML IM: CPT | Performed by: NURSE PRACTITIONER

## 2020-10-12 PROCEDURE — 90471 IMMUNIZATION ADMIN: CPT | Performed by: NURSE PRACTITIONER

## 2020-10-12 NOTE — TELEPHONE ENCOUNTER
Pt. Has a nurse visit today and requesting a pneumonia inj. Had Pneumovac 23 on 9/4/2019. Please advise and put order in for what is needed.   Thanks

## 2020-12-08 ENCOUNTER — PATIENT MESSAGE (OUTPATIENT)
Dept: ENDOCRINOLOGY CLINIC | Facility: CLINIC | Age: 45
End: 2020-12-08

## 2020-12-08 RX ORDER — SEMAGLUTIDE 1.34 MG/ML
0.5 INJECTION, SOLUTION SUBCUTANEOUS WEEKLY
Qty: 3 PEN | Refills: 0 | Status: SHIPPED | OUTPATIENT
Start: 2020-12-08 | End: 2021-03-03

## 2020-12-08 NOTE — TELEPHONE ENCOUNTER
Christina PEREYRA,     Please advise on below. Would you like to try ozempic or trulicity? We really won't know the cost until pharmacy runs a script.

## 2020-12-08 NOTE — TELEPHONE ENCOUNTER
From: Kaylene Pandey  To:  JUVENCIO Benson  Sent: 12/8/2020 8:08 AM CST  Subject: Prescription Question    Good morning - my pharmacy told me that the Bydureon that I have been using is no longer covered by the coupon I had (I was paying nothing for it

## 2020-12-08 NOTE — TELEPHONE ENCOUNTER
RN responded to patient with below information. Also provided her with the link to obtain savings card. Also advised her to contact the office if she hears back from the pharmacy with an issue before the office does.

## 2020-12-08 NOTE — TELEPHONE ENCOUNTER
Yes, lets try ozempic 0.5mg once weekly for 4 weeks. We will plan to review BG readings at that time and if stable we will either continue dose or if BG readings are higher, we will increase dose to 1mg once weekly. Please give patient ozempic coupon.

## 2020-12-15 RX ORDER — DAPAGLIFLOZIN 10 MG/1
TABLET, FILM COATED ORAL
Qty: 90 TABLET | Refills: 0 | Status: SHIPPED | OUTPATIENT
Start: 2020-12-15 | End: 2021-03-08

## 2021-01-21 RX ORDER — INSULIN HUMAN 500 [IU]/ML
INJECTION, SOLUTION SUBCUTANEOUS
Qty: 68 ML | Refills: 0 | Status: SHIPPED | OUTPATIENT
Start: 2021-01-21 | End: 2021-06-01

## 2021-01-24 RX ORDER — TRAZODONE HYDROCHLORIDE 50 MG/1
TABLET ORAL
Qty: 30 TABLET | Refills: 1 | Status: SHIPPED | OUTPATIENT
Start: 2021-01-24 | End: 2021-04-06

## 2021-02-02 ENCOUNTER — OFFICE VISIT (OUTPATIENT)
Dept: ENDOCRINOLOGY CLINIC | Facility: CLINIC | Age: 46
End: 2021-02-02

## 2021-02-02 ENCOUNTER — PATIENT MESSAGE (OUTPATIENT)
Dept: FAMILY MEDICINE CLINIC | Facility: CLINIC | Age: 46
End: 2021-02-02

## 2021-02-02 ENCOUNTER — TELEPHONE (OUTPATIENT)
Dept: FAMILY MEDICINE CLINIC | Facility: CLINIC | Age: 46
End: 2021-02-02

## 2021-02-02 VITALS
SYSTOLIC BLOOD PRESSURE: 116 MMHG | BODY MASS INDEX: 43.59 KG/M2 | WEIGHT: 246 LBS | HEIGHT: 63 IN | DIASTOLIC BLOOD PRESSURE: 82 MMHG | HEART RATE: 101 BPM | RESPIRATION RATE: 20 BRPM

## 2021-02-02 DIAGNOSIS — E11.9 TYPE 2 DIABETES MELLITUS WITHOUT COMPLICATION, WITH LONG-TERM CURRENT USE OF INSULIN (HCC): Primary | ICD-10-CM

## 2021-02-02 DIAGNOSIS — Z79.4 TYPE 2 DIABETES MELLITUS WITHOUT COMPLICATION, WITH LONG-TERM CURRENT USE OF INSULIN (HCC): Primary | ICD-10-CM

## 2021-02-02 LAB
CARTRIDGE LOT#: ABNORMAL NUMERIC
GLUCOSE BLOOD: 76
HEMOGLOBIN A1C: 6.5 % (ref 4.3–5.6)
TEST STRIP LOT #: NORMAL NUMERIC

## 2021-02-02 PROCEDURE — 82947 ASSAY GLUCOSE BLOOD QUANT: CPT | Performed by: NURSE PRACTITIONER

## 2021-02-02 PROCEDURE — 3008F BODY MASS INDEX DOCD: CPT | Performed by: NURSE PRACTITIONER

## 2021-02-02 PROCEDURE — 3079F DIAST BP 80-89 MM HG: CPT | Performed by: NURSE PRACTITIONER

## 2021-02-02 PROCEDURE — 83036 HEMOGLOBIN GLYCOSYLATED A1C: CPT | Performed by: NURSE PRACTITIONER

## 2021-02-02 PROCEDURE — 3044F HG A1C LEVEL LT 7.0%: CPT | Performed by: NURSE PRACTITIONER

## 2021-02-02 PROCEDURE — 99214 OFFICE O/P EST MOD 30 MIN: CPT | Performed by: NURSE PRACTITIONER

## 2021-02-02 PROCEDURE — 36416 COLLJ CAPILLARY BLOOD SPEC: CPT | Performed by: NURSE PRACTITIONER

## 2021-02-02 PROCEDURE — 3074F SYST BP LT 130 MM HG: CPT | Performed by: NURSE PRACTITIONER

## 2021-02-02 NOTE — TELEPHONE ENCOUNTER
From: Brittnee Cobb  To: Chares Kehr, MD  Sent: 2/2/2021 11:59 AM CST  Subject: Non-Urgent Medical Question    Hello - I have an appointment next week and have a few things I want to mention.  I have a bump/lump on the side/back of my upper left thigh

## 2021-02-02 NOTE — PATIENT INSTRUCTIONS
A1C: 6.5% today --> improved from 7.3% on 10/2/2020  Blood glucose: 76 in clinic today       Medications:   -continue with Metformin XR  2,000mg daily with breakfast   -continue with U500 120 units Breakfast, 120 unit with lunch and 125 unit dinner   -cont

## 2021-02-02 NOTE — TELEPHONE ENCOUNTER
Routed to the triage pool for RN follow-up.         ----- Message from Ruddy Cronin sent at 2/2/2021 11:59 AM CST -----  Regarding: Non-Urgent Medical Question  Contact: [phone]  Hello - I have an appointment next week and have a few things I want

## 2021-02-02 NOTE — PROGRESS NOTES
Name: Mary Jasso  Date: 2/2/2021    CHIEF COMPLAINT   Patient presents with:  Diabetes: follow up visit. Blood sugar today at POC test was 76.        HISTORY OF PRESENT ILLNESS   Mary Jasso is a 41year old female who presents for follow up on adolph Disease: no   Peripheral Vascular Disease: no     DIETARY COMPLIANCE:  Good; follows low carbohydrate diet.; eats 3 meals per day.      EXERCISE:   Yes- stays active with her children     Polyuria, polyphagia, polydipsia: no  Paresthesias: no  Blurred visio atorvastatin 40 MG Oral Tab, TAKE 1 TABLET BY MOUTH EVERY DAY AT NIGHT, Disp: 90 tablet, Rfl: 4  •  Insulin Pen Needle (CAREFINE PEN NEEDLES) 32G X 4 MM Does not apply Misc, Inject three times a day., Disp: 300 each, Rfl: 0  •  Levonorgestrel (MIRENA, 46 M mellitus without mention of complication, uncontrolled 2014     PAST SURGICAL HISTORY:   Past Surgical History:   Procedure Laterality Date   • ADENOIDECTOMY     • APPENDECTOMY  2015   •   ,   • CARPAL TUNNEL RELEASE Right with lunch and 125 unit dinner   -continue with ozempic 0.5mg once weekly   -continue with Farxiga 10mg once daily     -reviewed importance of starting to test BG 3x daily before each meal.   -reviewed target goal BG readings and A1C  -reviewed when to yovany

## 2021-02-03 NOTE — TELEPHONE ENCOUNTER
Verified name and . Patient reports that bump on knuckle has been there for a couple of months and that bump to upper thigh has been present since the summer.  She denies any pain to either bumps and states that they are both chronic issues that she wou

## 2021-02-10 ENCOUNTER — OFFICE VISIT (OUTPATIENT)
Dept: FAMILY MEDICINE CLINIC | Facility: CLINIC | Age: 46
End: 2021-02-10

## 2021-02-10 VITALS
SYSTOLIC BLOOD PRESSURE: 121 MMHG | DIASTOLIC BLOOD PRESSURE: 80 MMHG | HEART RATE: 102 BPM | WEIGHT: 246.63 LBS | BODY MASS INDEX: 43.7 KG/M2 | TEMPERATURE: 97 F | HEIGHT: 63 IN

## 2021-02-10 DIAGNOSIS — Z00.00 ROUTINE MEDICAL EXAM: Primary | ICD-10-CM

## 2021-02-10 DIAGNOSIS — E28.2 PCOS (POLYCYSTIC OVARIAN SYNDROME): ICD-10-CM

## 2021-02-10 DIAGNOSIS — E78.5 DYSLIPIDEMIA: ICD-10-CM

## 2021-02-10 DIAGNOSIS — Z79.4 TYPE 2 DIABETES MELLITUS WITHOUT COMPLICATION, WITH LONG-TERM CURRENT USE OF INSULIN (HCC): ICD-10-CM

## 2021-02-10 DIAGNOSIS — E11.9 TYPE 2 DIABETES MELLITUS WITHOUT COMPLICATION, WITH LONG-TERM CURRENT USE OF INSULIN (HCC): ICD-10-CM

## 2021-02-10 DIAGNOSIS — L98.9 SKIN LESION: ICD-10-CM

## 2021-02-10 PROCEDURE — 99396 PREV VISIT EST AGE 40-64: CPT | Performed by: FAMILY MEDICINE

## 2021-02-10 PROCEDURE — 3079F DIAST BP 80-89 MM HG: CPT | Performed by: FAMILY MEDICINE

## 2021-02-10 PROCEDURE — 3074F SYST BP LT 130 MM HG: CPT | Performed by: FAMILY MEDICINE

## 2021-02-10 PROCEDURE — 3008F BODY MASS INDEX DOCD: CPT | Performed by: FAMILY MEDICINE

## 2021-02-10 RX ORDER — BETAMETHASONE DIPROPIONATE 0.5 MG/G
CREAM TOPICAL
Qty: 30 G | Refills: 0 | Status: SHIPPED | OUTPATIENT
Start: 2021-02-10 | End: 2021-11-03

## 2021-02-10 NOTE — PROGRESS NOTES
HPI:   Ace Houston is a 41year old female who presents for a complete physical exam.    Pt here for regular physical. Diabetes has been better controlled. Received Pfizer vaccine on 1/22/2021  - 2nd one this week.      Wt Readings from Last 3 Encounter [facility])     diabetes for 2011   • GDM (gestational diabetes mellitus)    • High blood pressure    • High cholesterol 2/7/2014   • HORMONE DISORDER     PCOS   • Infertility female    • Obesity    • Other and unspecified hyperlipidemia 2/7/2014   • PCOS (polyc Pulse 102   Temp 97.4 °F (36.3 °C) (Temporal)   Ht 5' 3\" (1.6 m)   Wt 246 lb 9.6 oz (111.9 kg)   BMI 43.68 kg/m²     GENERAL: well developed, well nourished,in no apparent distress  SKIN: pos skin lesion on face bluish lesion on left cheek.  Left buttock s

## 2021-03-03 RX ORDER — SEMAGLUTIDE 1.34 MG/ML
0.5 INJECTION, SOLUTION SUBCUTANEOUS WEEKLY
Qty: 1.5 ML | Refills: 2 | Status: SHIPPED | OUTPATIENT
Start: 2021-03-03 | End: 2021-04-08

## 2021-03-04 DIAGNOSIS — Z23 NEED FOR VACCINATION: ICD-10-CM

## 2021-03-08 RX ORDER — DAPAGLIFLOZIN 10 MG/1
TABLET, FILM COATED ORAL
Qty: 90 TABLET | Refills: 0 | Status: SHIPPED | OUTPATIENT
Start: 2021-03-08 | End: 2021-06-09

## 2021-03-17 ENCOUNTER — OFFICE VISIT (OUTPATIENT)
Dept: DERMATOLOGY CLINIC | Facility: CLINIC | Age: 46
End: 2021-03-17
Payer: COMMERCIAL

## 2021-03-17 DIAGNOSIS — D23.9 BLUE NEVUS: Primary | ICD-10-CM

## 2021-03-17 DIAGNOSIS — L72.0 EPIDERMAL CYST: ICD-10-CM

## 2021-03-17 DIAGNOSIS — D23.5 BENIGN NEOPLASM OF SKIN OF TRUNK, EXCEPT SCROTUM: ICD-10-CM

## 2021-03-17 DIAGNOSIS — D18.01 CHERRY ANGIOMA: ICD-10-CM

## 2021-03-17 DIAGNOSIS — D22.9 MULTIPLE NEVI: ICD-10-CM

## 2021-03-17 PROCEDURE — 99203 OFFICE O/P NEW LOW 30 MIN: CPT | Performed by: DERMATOLOGY

## 2021-03-28 NOTE — PROGRESS NOTES
Santiago Rivera is a 41year old female. HPI:     CC:  Patient presents with:  Derm Problem: New patient presents with lesion to left cheek, left temple, and scalp. Dr. Jorgito Park referred for lesions. Denies bleeding or changing.  No personal or family hx of sk Medications   Medication Sig Dispense Refill   • FARXIGA 10 MG Oral Tab TAKE 1 TABLET BY MOUTH EVERY DAY 90 tablet 0   • OZEMPIC, 0.25 OR 0.5 MG/DOSE, 2 MG/1.5ML Subcutaneous Solution Pen-injector INJECT 0.5 MG INTO THE SKIN ONCE A WEEK.  1.5 mL 2   • betam hyperlipidemia 2/7/2014   • PCOS (polycystic ovarian syndrome)    • Seasonal allergies    • Shoulder tendonitis    • Type II or unspecified type diabetes mellitus without mention of complication, uncontrolled 2/7/2014     Past Surgical History:   Procedure Outdoor occupation: Not Asked        Breast feeding: Not Asked        Reaction to local anesthetic: No    Social History Narrative      Not on file    Social Determinants of Health  Financial Resource Strain:       Difficulty of Paying Living Expenses:   F reviewed as noted. Physical Examination:     Well-developed well-nourished patient alert oriented in no acute distress.   Exam total-body performed, including scalp, head, neck, face,nails, hair, external eyes, including conjunctival mucosa, eyelids, specific lesions. See additional diagnoses. Pros cons of various therapies, risks benefits discussed. Pathophysiology discussed with patient. Therapeutic options reviewed. See  Medications in grid. Instructions reviewed at length.     Benign nevi, tata

## 2021-04-06 RX ORDER — TRAZODONE HYDROCHLORIDE 50 MG/1
50 TABLET ORAL NIGHTLY PRN
Qty: 30 TABLET | Refills: 1 | Status: SHIPPED | OUTPATIENT
Start: 2021-04-06 | End: 2021-04-29

## 2021-04-08 RX ORDER — SEMAGLUTIDE 1.34 MG/ML
0.5 INJECTION, SOLUTION SUBCUTANEOUS WEEKLY
Qty: 4.5 ML | Refills: 1 | Status: SHIPPED | OUTPATIENT
Start: 2021-04-08 | End: 2022-01-03

## 2021-04-29 RX ORDER — TRAZODONE HYDROCHLORIDE 50 MG/1
50 TABLET ORAL NIGHTLY PRN
Qty: 90 TABLET | Refills: 1 | Status: SHIPPED | OUTPATIENT
Start: 2021-04-29 | End: 2022-12-27

## 2021-05-19 ENCOUNTER — OFFICE VISIT (OUTPATIENT)
Dept: DERMATOLOGY CLINIC | Facility: CLINIC | Age: 46
End: 2021-05-19

## 2021-05-19 DIAGNOSIS — D48.5 NEOPLASM OF UNCERTAIN BEHAVIOR OF SKIN: Primary | ICD-10-CM

## 2021-05-19 PROCEDURE — 11402 EXC TR-EXT B9+MARG 1.1-2 CM: CPT | Performed by: DERMATOLOGY

## 2021-05-19 PROCEDURE — 12031 INTMD RPR S/A/T/EXT 2.5 CM/<: CPT | Performed by: DERMATOLOGY

## 2021-05-19 PROCEDURE — 88304 TISSUE EXAM BY PATHOLOGIST: CPT | Performed by: DERMATOLOGY

## 2021-05-19 NOTE — PROGRESS NOTES
Operative Report Excision      Clinical diagnosis:    Size of lesion:  pt with painful cyst  Spec 1 Description >>>>>: left hip  Spec 1 Comment: epidermal cyst r/o other inflamed previously 1.5 cm  L

## 2021-05-20 ENCOUNTER — NURSE TRIAGE (OUTPATIENT)
Dept: FAMILY MEDICINE CLINIC | Facility: CLINIC | Age: 46
End: 2021-05-20

## 2021-05-20 NOTE — TELEPHONE ENCOUNTER
Action Requested: Summary for Provider     []  Critical Lab, Recommendations Needed  [] Need Additional Advice  []   FYI    []   Need Orders  [] Need Medications Sent to Pharmacy  []  Other     SUMMARY: Per protocol disposition advised office visit within

## 2021-05-21 ENCOUNTER — OFFICE VISIT (OUTPATIENT)
Dept: FAMILY MEDICINE CLINIC | Facility: CLINIC | Age: 46
End: 2021-05-21
Payer: COMMERCIAL

## 2021-05-21 VITALS
HEART RATE: 109 BPM | HEIGHT: 63 IN | BODY MASS INDEX: 43.77 KG/M2 | DIASTOLIC BLOOD PRESSURE: 65 MMHG | WEIGHT: 247 LBS | TEMPERATURE: 98 F | SYSTOLIC BLOOD PRESSURE: 102 MMHG

## 2021-05-21 DIAGNOSIS — M25.512 ACUTE PAIN OF LEFT SHOULDER: Primary | ICD-10-CM

## 2021-05-21 PROCEDURE — 3074F SYST BP LT 130 MM HG: CPT | Performed by: STUDENT IN AN ORGANIZED HEALTH CARE EDUCATION/TRAINING PROGRAM

## 2021-05-21 PROCEDURE — 3078F DIAST BP <80 MM HG: CPT | Performed by: STUDENT IN AN ORGANIZED HEALTH CARE EDUCATION/TRAINING PROGRAM

## 2021-05-21 PROCEDURE — 99213 OFFICE O/P EST LOW 20 MIN: CPT | Performed by: STUDENT IN AN ORGANIZED HEALTH CARE EDUCATION/TRAINING PROGRAM

## 2021-05-21 PROCEDURE — 3008F BODY MASS INDEX DOCD: CPT | Performed by: STUDENT IN AN ORGANIZED HEALTH CARE EDUCATION/TRAINING PROGRAM

## 2021-05-21 NOTE — PROGRESS NOTES
HPI:    Patient ID: Viki Rios is a 41year old female. HPI  Pt presenting with Left shoulder pain. She reports increased anterior Left shoulder pain over the last few weeks, worse when sleeping on her Left side or with increased activity.  She is R Syringe-Needle U-100 31G X 5/16\" 0.5 ML Does not apply Misc Inject three times daily 100 each 0   • Blood Glucose Monitoring Suppl (DARON CONTOUR MONITOR) W/DEVICE Does not apply Kit by Does not apply route.        Allergies:  Cat Dander [Dander]       Iod as needed  - follow-up with Ortho in 4-6 weeks if no improvement  - PHYSICAL THERAPY - INTERNAL  - ORTHOPEDIC - INTERNAL    Pt verbalized understanding and agrees with plan. No orders of the defined types were placed in this encounter.       Meds This Vi

## 2021-05-27 NOTE — PROGRESS NOTES
The pathology report from last visit showed left hip Epidermoid cyst. . Please log in test results, send biopsy results letter. Pt to rtc for sr or prn.

## 2021-05-30 NOTE — PROGRESS NOTES
Patient here for excision of above lesion. Physical examination: Tender cystic nodule 1.5 cm at left hip    Assessment/ Plan : This lesion should be excised. This will result in a permanent scar, which will be longer than the size of the lesion.  Rajni Scott

## 2021-06-01 RX ORDER — INSULIN HUMAN 500 [IU]/ML
INJECTION, SOLUTION SUBCUTANEOUS
Qty: 75 ML | Refills: 0 | Status: SHIPPED | OUTPATIENT
Start: 2021-06-01 | End: 2021-09-15

## 2021-06-01 NOTE — TELEPHONE ENCOUNTER
LOV: 02/02/21  LR: 01/21/21    Future Appointments   Date Time Provider Salvador Duran   8/2/2021  8:30 AM JUVENCIO Melvin [facility]     Refilled per protocol.

## 2021-06-02 ENCOUNTER — NURSE ONLY (OUTPATIENT)
Dept: DERMATOLOGY CLINIC | Facility: CLINIC | Age: 46
End: 2021-06-02

## 2021-06-09 RX ORDER — DAPAGLIFLOZIN 10 MG/1
TABLET, FILM COATED ORAL
Qty: 90 TABLET | Refills: 1 | Status: SHIPPED | OUTPATIENT
Start: 2021-06-09 | End: 2021-12-07

## 2021-06-09 NOTE — TELEPHONE ENCOUNTER
LOV: 02/02/21  LR: 03/08/21    Future Appointments   Date Time Provider Salvador Duran   8/2/2021  8:30 AM JUVENCIO Cameron [facility]     Refilled per protocol.

## 2021-06-15 ENCOUNTER — OFFICE VISIT (OUTPATIENT)
Dept: PHYSICAL THERAPY | Facility: HOSPITAL | Age: 46
End: 2021-06-15
Attending: STUDENT IN AN ORGANIZED HEALTH CARE EDUCATION/TRAINING PROGRAM
Payer: COMMERCIAL

## 2021-06-15 DIAGNOSIS — M25.512 ACUTE PAIN OF LEFT SHOULDER: ICD-10-CM

## 2021-06-15 PROCEDURE — 97110 THERAPEUTIC EXERCISES: CPT

## 2021-06-15 PROCEDURE — 97162 PT EVAL MOD COMPLEX 30 MIN: CPT

## 2021-06-15 NOTE — PROGRESS NOTES
SHOULDER EVALUATION:   Referring Physician: Dr. Charmayne Gutting  Diagnosis: Acute pain of left shoulder (M25.512)     Date of Service: 6/15/2021     PATIENT SUMMARY   Caffie Buerger is a 41year old female who presents to therapy today with complaints of L shoul bike ride, play tennis and swim. Past medical history was reviewed with Ritu Sargent.  Significant findings include:  Past Medical History:   Diagnosis Date   • Allergic rhinitis    • Asthma     allergy induced   • Carpal tunnel syndrome    • Diabetes ([address] WNL, decreased post glide [address] jt; will assess AC jt NV     Flexibility: mod tightness UT and levator scap    Strength/MMT: (* denotes performed with pain)  Shoulder Elbow Scapular   Flexion: R 5/5; L 4/5*  Abduction: R 5/5; L 4-/5*  ER: R 5/5; L 4+/5  IR: R demonstrate increased functional ability. Pt will be able to lift light-mod weights/objects with proper mechanics and min to no pain. Pt will be able to reach [facility] to perform ADL's and other tasks with min difficulty.        Frequency / Duration: Patient

## 2021-06-16 ENCOUNTER — TELEPHONE (OUTPATIENT)
Dept: PHYSICAL THERAPY | Facility: HOSPITAL | Age: 46
End: 2021-06-16

## 2021-06-22 ENCOUNTER — OFFICE VISIT (OUTPATIENT)
Dept: PHYSICAL THERAPY | Facility: HOSPITAL | Age: 46
End: 2021-06-22
Attending: STUDENT IN AN ORGANIZED HEALTH CARE EDUCATION/TRAINING PROGRAM
Payer: COMMERCIAL

## 2021-06-22 DIAGNOSIS — M25.512 ACUTE PAIN OF LEFT SHOULDER: ICD-10-CM

## 2021-06-22 PROCEDURE — 97112 NEUROMUSCULAR REEDUCATION: CPT

## 2021-06-22 PROCEDURE — 97110 THERAPEUTIC EXERCISES: CPT

## 2021-06-22 PROCEDURE — 97140 MANUAL THERAPY 1/> REGIONS: CPT

## 2021-06-22 NOTE — PROGRESS NOTES
Dx: Acute pain of left shoulder (M25.512)             Insurance (Authorized # of Visits):  BENJA Taveras 53 (8 visits)            Authorizing Physician: Dr. Nory Alfonso  Next MD visit: as needed   Fall Risk: standard         Precautions:  HTN and DM - controlled we with red band - 2x15  Pendulums ~30 seconds       Neuro Sondra:  Scap squeezes seated - 10x5\"  Mid rows with green band - 2x15  Low rows with green band - 2x15       HEP: see above    Charges: Man - 1, Neuro Sondra - 1, TherEx - 1         Total Timed Treatmen

## 2021-06-23 ENCOUNTER — PATIENT MESSAGE (OUTPATIENT)
Dept: DERMATOLOGY CLINIC | Facility: CLINIC | Age: 46
End: 2021-06-23

## 2021-06-23 NOTE — TELEPHONE ENCOUNTER
Dr. Hancock Resides pt seen 5/19/21, please see her msg/attached photo - please advise on removal of this. Ok to schedule here vs plastics?

## 2021-06-23 NOTE — TELEPHONE ENCOUNTER
From: Yudelka Birmingham  To: Charbel Patiño MD  Sent: 6/23/2021 2:54 PM CDT  Subject: Visit Kameron Langley - I am feeling concerned about the mole on my left cheek, the one that looks a little blueish?  I have had two different people te

## 2021-06-24 ENCOUNTER — OFFICE VISIT (OUTPATIENT)
Dept: PHYSICAL THERAPY | Facility: HOSPITAL | Age: 46
End: 2021-06-24
Attending: STUDENT IN AN ORGANIZED HEALTH CARE EDUCATION/TRAINING PROGRAM
Payer: COMMERCIAL

## 2021-06-24 PROCEDURE — 97140 MANUAL THERAPY 1/> REGIONS: CPT

## 2021-06-24 PROCEDURE — 97112 NEUROMUSCULAR REEDUCATION: CPT

## 2021-06-24 PROCEDURE — 97110 THERAPEUTIC EXERCISES: CPT

## 2021-06-24 NOTE — TELEPHONE ENCOUNTER
Possible punch biopsy.-this cannot be doubled in. Needs 15 min appt.   This looks about the same as lov's

## 2021-06-24 NOTE — PROGRESS NOTES
Dx: Acute pain of left shoulder (M25.512)             Insurance (Authorized # of Visits):  BENJA Taveras 53 (8 visits)            Authorizing Physician: Dr. Pat Bond MD visit: as needed   Fall Risk: standard         Precautions:  HTN and DM - controlled we all directions - gr II      TherEx:   Pulleys into flex - 20  Chest press with cane - 15  Sidelying shoulder ER - 15  No money with red band - 2x15  Pendulums ~30 seconds TherEx:   -Supine wand flexion x20  -Chest press with cane - 15  -Sidelying shoulder

## 2021-06-25 ENCOUNTER — APPOINTMENT (OUTPATIENT)
Dept: PHYSICAL THERAPY | Facility: HOSPITAL | Age: 46
End: 2021-06-25
Attending: STUDENT IN AN ORGANIZED HEALTH CARE EDUCATION/TRAINING PROGRAM
Payer: COMMERCIAL

## 2021-06-25 NOTE — TELEPHONE ENCOUNTER
Spoke with patient. Verified name and . Informed patient per Dr. Bennie Hernandez will need a punch biopsy- explained procedure to patient. Patient verbalizes understanding. Patient scheduled for  at 4 pm.no further questions at this time.

## 2021-06-25 NOTE — TELEPHONE ENCOUNTER
Regarding: FW: Visit Follow-up Question  Contact: [phone]      ----- Message -----  From: Yazmin Sam RN  Sent: 6/23/2021   3:36 PM CDT  To: Shari Perez MD  Subject: RE: Visit Follow-up Question                     ----- Message from Danny

## 2021-06-30 ENCOUNTER — OFFICE VISIT (OUTPATIENT)
Dept: PHYSICAL THERAPY | Facility: HOSPITAL | Age: 46
End: 2021-06-30
Attending: STUDENT IN AN ORGANIZED HEALTH CARE EDUCATION/TRAINING PROGRAM
Payer: COMMERCIAL

## 2021-06-30 PROCEDURE — 97110 THERAPEUTIC EXERCISES: CPT

## 2021-06-30 PROCEDURE — 97112 NEUROMUSCULAR REEDUCATION: CPT

## 2021-06-30 PROCEDURE — 97140 MANUAL THERAPY 1/> REGIONS: CPT

## 2021-06-30 NOTE — PROGRESS NOTES
Dx: Acute pain of left shoulder (M25.512)             Insurance (Authorized # of Visits):  BENJA Taveras 53 (8 visits)            Authorizing Physician: Dr. Paula Alvarez  Next MD visit: as needed   Fall Risk: standard         Precautions:  HTN and DM - controlled we glide - gr II  Scap mobilization all directions - gr II Manual:  DTM to biceps  GH distraction - gr II-III  Post glide - gr II  Scap mobilization all directions - gr II     TherEx:   Pulleys into flex - 20  Chest press with cane - 15  Sidelying shoulder ER

## 2021-07-02 ENCOUNTER — OFFICE VISIT (OUTPATIENT)
Dept: PHYSICAL THERAPY | Facility: HOSPITAL | Age: 46
End: 2021-07-02
Attending: STUDENT IN AN ORGANIZED HEALTH CARE EDUCATION/TRAINING PROGRAM
Payer: COMMERCIAL

## 2021-07-02 PROCEDURE — 97110 THERAPEUTIC EXERCISES: CPT

## 2021-07-02 PROCEDURE — 97112 NEUROMUSCULAR REEDUCATION: CPT

## 2021-07-02 PROCEDURE — 97140 MANUAL THERAPY 1/> REGIONS: CPT

## 2021-07-02 NOTE — PROGRESS NOTES
Dx: Acute pain of left shoulder (M25.512)             Insurance (Authorized # of Visits):  BENJA Taveras 53 (8 visits)            Authorizing Physician: Dr. Jaspreet Bond MD visit: as needed   Fall Risk: standard         Precautions:  HTN and DM - controlled we RTC strengthening and shoulder ROM. Discussed patient POC with the PT. Date: 6/22/2021  TX#: 2/8 Date: 6/24/21                TX#: 3/8 Date: 6/30/21               TX#: 4/8 Date:  7/2/21               TX#: 5/8 Date:    Tx#: 6/   Manual:  DTM to biceps  [address] Treatment: 42 min  Total Treatment Time: 43 min

## 2021-07-07 ENCOUNTER — OFFICE VISIT (OUTPATIENT)
Dept: PHYSICAL THERAPY | Facility: HOSPITAL | Age: 46
End: 2021-07-07
Attending: STUDENT IN AN ORGANIZED HEALTH CARE EDUCATION/TRAINING PROGRAM
Payer: COMMERCIAL

## 2021-07-07 PROCEDURE — 97110 THERAPEUTIC EXERCISES: CPT

## 2021-07-07 PROCEDURE — 97112 NEUROMUSCULAR REEDUCATION: CPT

## 2021-07-07 PROCEDURE — 97140 MANUAL THERAPY 1/> REGIONS: CPT

## 2021-07-07 NOTE — PROGRESS NOTES
Dx: Acute pain of left shoulder (M25.512)             Insurance (Authorized # of Visits):  BENJA Taveras 53 (8 visits)            Authorizing Physician: Dr. Mariposa Bond MD visit: as needed   Fall Risk: standard         Precautions:  HTN and DM - controlled we display improved score on the outcome measure to 75% to demonstrate increased functional ability. Pt will be able to lift light-mod weights/objects with proper mechanics and min to no pain.    Pt will be able to reach [facility] to perform ADL's and other tasks w abd x5 (up to 22)   -Seated flexion x10  -Seated abd x10 up to 90 deg  -Seated bicep curls x10 with 2# (c/o pulling muscle)  -Prone scap squeeze 10x5\"  -Prone ext x15  -Prone rows x15  -Prone hori abd x15  -Prone T with thumb up/down x15 ea (c/o slight Pu

## 2021-07-09 ENCOUNTER — APPOINTMENT (OUTPATIENT)
Dept: PHYSICAL THERAPY | Facility: HOSPITAL | Age: 46
End: 2021-07-09
Attending: STUDENT IN AN ORGANIZED HEALTH CARE EDUCATION/TRAINING PROGRAM
Payer: COMMERCIAL

## 2021-07-12 ENCOUNTER — OFFICE VISIT (OUTPATIENT)
Dept: PHYSICAL THERAPY | Facility: HOSPITAL | Age: 46
End: 2021-07-12
Attending: STUDENT IN AN ORGANIZED HEALTH CARE EDUCATION/TRAINING PROGRAM
Payer: COMMERCIAL

## 2021-07-12 PROCEDURE — 97140 MANUAL THERAPY 1/> REGIONS: CPT

## 2021-07-12 PROCEDURE — 97110 THERAPEUTIC EXERCISES: CPT

## 2021-07-12 NOTE — PROGRESS NOTES
ProgressSummary  Pt has attended 7 visits in Physical Therapy.      Dx: Acute pain of left shoulder (M25.512)             Insurance (Authorized # of Visits):  BENJA Taveras 53 (8 visits)            Authorizing Physician: Dr. Jaspreet Bond MD visit: as needed independent with HEP to assist with pain management and continue to improve function.  - met for current program  Pt will demonstrate decreased pain to <3/10 during functional tasks and ADL's. - almost met  Pt will display improved score on the outcome tiffany 15  -Sidelying shoulder ER -20 with towe roll under armpit  -No money with red band - 2x20  -Pulley flexion/scaption 2x20 ea   TherEx:   -Pulley flexion/scaption 2x20 ea  -Wall ladder flexion x10 (up to 30)  -Sidelying shoulder ER -20 with towe roll under

## 2021-07-23 ENCOUNTER — OFFICE VISIT (OUTPATIENT)
Dept: DERMATOLOGY CLINIC | Facility: CLINIC | Age: 46
End: 2021-07-23

## 2021-07-23 DIAGNOSIS — D18.01 CHERRY ANGIOMA: ICD-10-CM

## 2021-07-23 DIAGNOSIS — D23.9 BLUE NEVUS: Primary | ICD-10-CM

## 2021-07-23 DIAGNOSIS — D22.9 MULTIPLE NEVI: ICD-10-CM

## 2021-07-23 PROCEDURE — 99213 OFFICE O/P EST LOW 20 MIN: CPT | Performed by: DERMATOLOGY

## 2021-07-26 ENCOUNTER — OFFICE VISIT (OUTPATIENT)
Dept: PHYSICAL THERAPY | Facility: HOSPITAL | Age: 46
End: 2021-07-26
Attending: STUDENT IN AN ORGANIZED HEALTH CARE EDUCATION/TRAINING PROGRAM
Payer: COMMERCIAL

## 2021-07-26 PROCEDURE — 97140 MANUAL THERAPY 1/> REGIONS: CPT

## 2021-07-26 PROCEDURE — 97110 THERAPEUTIC EXERCISES: CPT

## 2021-07-26 NOTE — PROGRESS NOTES
Discharge Summary  Pt has attended 8 visits in Physical Therapy.      Dx: Acute pain of left shoulder (M25.512)             Insurance (Authorized # of Visits):  BENJA Taveras 53 (8 visits)            Authorizing Physician: Dr. Toi Dobbs  Next MD visit: as needed during functional tasks and ADL's. - met  Pt will display improved score on the outcome measure to 75% to demonstrate increased functional ability. - mostly met  Pt will be able to lift light-mod weights/objects with proper mechanics and min to no pain.  - - 5x15\"   -Open books with hand rested on ribcage - 5x15\" B  -Wall slides into flex with focus on posture and decreased UT activation - 10  -Reassessment performed  -Discussed posture with pt and importance of position prior to reaching OH to assist with

## 2021-07-29 NOTE — Clinical Note
5/25/2025  I had the pleasure of seeing your patient,  Kay Tracy, Thank you for allowing me to participate in the care for this patient.   She will need a referral for excision of cyst we will plan this at her convenience discussed this will need to be kept dry no swimming while sutures in place.  Please see attached visit notes.  Please contact me with any questions or concerns.  Sincerely,  Marlene Zambrano MD, FAAD, [facility]     No

## 2021-08-01 NOTE — PROGRESS NOTES
Alicia Graham is a 40year old female. HPI:     CC:  Patient presents with:  Lesion: LOV 5/19/21. pt presenting today with lesion to L cheek for 2 years. pt states lesion has changed in color. Dneies itching or pain.  KMT to reasses and Punch BX        A with lunch AND 0.25 mL (125 Units total) daily with dinner. 75 mL 0   • traZODone HCl 50 MG Oral Tab Take 1 tablet (50 mg total) by mouth nightly as needed for Sleep.  90 tablet 1   • OZEMPIC, 0.25 OR 0.5 MG/DOSE, 2 MG/1.5ML Subcutaneous Solution Pen-inject complication, uncontrolled 2014     Past Surgical History:   Procedure Laterality Date   • ADENOIDECTOMY     • APPENDECTOMY  2015   •   ,   • CARPAL TUNNEL RELEASE Right    • LAPAROSCOPIC APPENDECTOMY  2/27/15   • OTHER  2008 Transportation Needs:       Lack of Transportation (Medical):       Lack of Transportation (Non-Medical):   Physical Activity:       Days of Exercise per Week:       Minutes of Exercise per Session:   Stress:       Feeling of Stress :   Social Connection exam. pigmented lesions examined with dermoscopy benign-appearing patterns. Waxy tannish keratotic papules scattered, cherry-red vascular papules scattered. See map today's date for lesions noted .       Otherwise remarkable for lesions as noted on m grid.  Instructions reviewed at length. Benign nevi, seborrheic  keratoses, cherry angiomas:  Reassurance regarding other benign skin lesions. Signs and symptoms of skin cancer, ABCDE's of melanoma discussed with patient.  Sunscreen use, sun protection, s

## 2021-08-06 RX ORDER — LISINOPRIL 5 MG/1
5 TABLET ORAL DAILY
Qty: 90 TABLET | Refills: 1 | Status: SHIPPED | OUTPATIENT
Start: 2021-08-06 | End: 2022-01-24

## 2021-08-06 NOTE — TELEPHONE ENCOUNTER
Refill passed per [facility] protocol.     Requested Prescriptions   Pending Prescriptions Disp Refills    LISINOPRIL 5 MG Oral Tab [Pharmacy Med Name: LISINOPRIL 5 MG TABLET] 90 tablet 4     Sig: TAKE 1 TABLET BY MOUTH EVERY DAY        Hypertensive Medications Protocol Passed - 8/6/2021  2:04 AM        Passed - CMP or BMP in past 12 months        Passed - Appointment in past 6 or next 3 months        Passed - GFR Non- > 50     Lab Results   Component Value Date    GFRNAA 107 10/10/2020                     Recent Outpatient Visits              1 week ago     [address] [place], [place]    Office Visit    2 weeks ago [facility] Camilo Black MD    Office Visit    3 weeks ago     [address] [place], [place]    Office Visit    1 month ago       [place], [place]    Office Visit    1 month ago       [place], [place]    Office Visit

## 2021-08-10 ENCOUNTER — APPOINTMENT (OUTPATIENT)
Dept: PHYSICAL THERAPY | Facility: HOSPITAL | Age: 46
End: 2021-08-10
Attending: STUDENT IN AN ORGANIZED HEALTH CARE EDUCATION/TRAINING PROGRAM
Payer: COMMERCIAL

## 2021-08-17 ENCOUNTER — LAB REQUISITION (OUTPATIENT)
Dept: LAB | Facility: HOSPITAL | Age: 46
End: 2021-08-17
Payer: COMMERCIAL

## 2021-08-17 DIAGNOSIS — Z01.419 ENCOUNTER FOR GYNECOLOGICAL EXAMINATION (GENERAL) (ROUTINE) WITHOUT ABNORMAL FINDINGS: ICD-10-CM

## 2021-08-17 DIAGNOSIS — Z11.51 ENCOUNTER FOR SCREENING FOR HUMAN PAPILLOMAVIRUS (HPV): ICD-10-CM

## 2021-08-17 PROCEDURE — 87624 HPV HI-RISK TYP POOLED RSLT: CPT | Performed by: OBSTETRICS & GYNECOLOGY

## 2021-08-17 PROCEDURE — 88175 CYTOPATH C/V AUTO FLUID REDO: CPT | Performed by: OBSTETRICS & GYNECOLOGY

## 2021-08-18 LAB — HPV I/H RISK 1 DNA SPEC QL NAA+PROBE: NEGATIVE

## 2021-08-20 ENCOUNTER — APPOINTMENT (OUTPATIENT)
Dept: PHYSICAL THERAPY | Facility: HOSPITAL | Age: 46
End: 2021-08-20
Attending: STUDENT IN AN ORGANIZED HEALTH CARE EDUCATION/TRAINING PROGRAM
Payer: COMMERCIAL

## 2021-08-21 ENCOUNTER — HOSPITAL ENCOUNTER (OUTPATIENT)
Dept: MAMMOGRAPHY | Facility: HOSPITAL | Age: 46
Discharge: HOME | End: 2021-08-21
Attending: OBSTETRICS & GYNECOLOGY
Payer: COMMERCIAL

## 2021-08-21 DIAGNOSIS — Z12.31 ENCOUNTER FOR SCREENING MAMMOGRAM FOR MALIGNANT NEOPLASM OF BREAST: ICD-10-CM

## 2021-08-21 PROCEDURE — 77063 BREAST TOMOSYNTHESIS BI: CPT | Performed by: OBSTETRICS & GYNECOLOGY

## 2021-08-21 PROCEDURE — 77067 SCR MAMMO BI INCL CAD: CPT | Performed by: OBSTETRICS & GYNECOLOGY

## 2021-09-16 RX ORDER — INSULIN HUMAN 500 [IU]/ML
INJECTION, SOLUTION SUBCUTANEOUS
Qty: 75 ML | Refills: 0 | Status: SHIPPED | OUTPATIENT
Start: 2021-09-16 | End: 2022-02-07

## 2021-09-29 NOTE — TELEPHONE ENCOUNTER
Spoke with patient who states her son has been in the hospital since august, states she will call back to schedule once they are back home.

## 2021-10-21 ENCOUNTER — IMMUNIZATION (OUTPATIENT)
Dept: LAB | Facility: HOSPITAL | Age: 46
End: 2021-10-21
Attending: EMERGENCY MEDICINE
Payer: COMMERCIAL

## 2021-10-21 DIAGNOSIS — Z23 NEED FOR VACCINATION: Primary | ICD-10-CM

## 2021-10-21 PROCEDURE — 0003A SARSCOV2 VAC 30MCG/0.3ML IM: CPT

## 2021-11-01 ENCOUNTER — OFFICE VISIT (OUTPATIENT)
Dept: ENDOCRINOLOGY CLINIC | Facility: CLINIC | Age: 46
End: 2021-11-01

## 2021-11-01 VITALS
WEIGHT: 254 LBS | SYSTOLIC BLOOD PRESSURE: 128 MMHG | BODY MASS INDEX: 45 KG/M2 | DIASTOLIC BLOOD PRESSURE: 77 MMHG | HEART RATE: 96 BPM

## 2021-11-01 DIAGNOSIS — E11.9 TYPE 2 DIABETES MELLITUS WITHOUT COMPLICATION, WITH LONG-TERM CURRENT USE OF INSULIN (HCC): Primary | ICD-10-CM

## 2021-11-01 DIAGNOSIS — Z79.4 TYPE 2 DIABETES MELLITUS WITHOUT COMPLICATION, WITH LONG-TERM CURRENT USE OF INSULIN (HCC): Primary | ICD-10-CM

## 2021-11-01 PROCEDURE — 3078F DIAST BP <80 MM HG: CPT | Performed by: NURSE PRACTITIONER

## 2021-11-01 PROCEDURE — 83036 HEMOGLOBIN GLYCOSYLATED A1C: CPT | Performed by: NURSE PRACTITIONER

## 2021-11-01 PROCEDURE — 3074F SYST BP LT 130 MM HG: CPT | Performed by: NURSE PRACTITIONER

## 2021-11-01 PROCEDURE — 82947 ASSAY GLUCOSE BLOOD QUANT: CPT | Performed by: NURSE PRACTITIONER

## 2021-11-01 PROCEDURE — 3044F HG A1C LEVEL LT 7.0%: CPT | Performed by: INTERNAL MEDICINE

## 2021-11-01 PROCEDURE — 36416 COLLJ CAPILLARY BLOOD SPEC: CPT | Performed by: NURSE PRACTITIONER

## 2021-11-01 PROCEDURE — 99213 OFFICE O/P EST LOW 20 MIN: CPT | Performed by: NURSE PRACTITIONER

## 2021-11-01 NOTE — PATIENT INSTRUCTIONS
A1C: 6.4% today -->slight decrease from 6.5% on 2/2/2021  Blood glucose: 71 -->61--> 106 in clinic today     Medications:   -continue with Metformin XR  2,000mg daily with breakfast   -continue with U500 120 units Breakfast, 120 unit with lunch and 125 uni

## 2021-11-01 NOTE — PROGRESS NOTES
Name: Adriana Sepulveda  Date: 11/1/2021    CHIEF COMPLAINT   No chief complaint on file. HISTORY OF PRESENT ILLNESS   Adriana Sepulveda is a 40year old female who presents for follow up on diabetes management.   In the past 9 months DM has been well cont no     DIETARY COMPLIANCE:  Fair; tries to eat low carb diet when she is able   Eats 3 meals per day    EXERCISE:   No -- has been busy taking care of her son    Polyuria, polyphagia, polydipsia: no  Paresthesias: no  Blurred vision: no  Recent steroids, i Reported on 7/23/2021 ), Disp: 30 g, Rfl: 0  •  METFORMIN HCL  MG Oral Tablet 24 Hr, TAKE FOUR TABLETS BY MOUTH DAILY WITH BREAKFAST., Disp: 360 tablet, Rfl: 4  •  atorvastatin 40 MG Oral Tab, TAKE 1 TABLET BY MOUTH EVERY DAY AT NIGHT, Disp: 90 table 2/7/2014   • PCOS (polycystic ovarian syndrome)    • Seasonal allergies    • Shoulder tendonitis    • Type II or unspecified type diabetes mellitus without mention of complication, uncontrolled 2/7/2014     PAST SURGICAL HISTORY:   Past Surgical History: understanding of the importance of glycemic control and the goals of therapy.   -Discussed with patient glucose targets ranges (Fasting  and post prandial <180).    1.Type 2 Diabetes Mellitus without complications on  long-term insulin   LAB DATA  HbA best of my knowledge. Patient verbalizes understanding of these issues and agrees to the plan.       11/1/2021  JUVENCIO Cochran

## 2021-11-02 ENCOUNTER — NURSE ONLY (OUTPATIENT)
Dept: GASTROENTEROLOGY | Facility: CLINIC | Age: 46
End: 2021-11-02

## 2021-11-02 DIAGNOSIS — Z12.12 SCREENING FOR COLORECTAL CANCER: Primary | ICD-10-CM

## 2021-11-02 DIAGNOSIS — Z12.11 SCREENING FOR COLORECTAL CANCER: Primary | ICD-10-CM

## 2021-11-02 NOTE — PROGRESS NOTES
Toby Casillas patient for her scheduled telephone colon screening.      Referred to GI for her first colonoscopy     Anticoagulants: no   Diabetic Meds: Farxiga, humulin R, metformin, ozempic   BP meds(Ace inhibitors/ARB's): lisinopril am dose   Weight

## 2021-11-03 ENCOUNTER — NURSE TRIAGE (OUTPATIENT)
Dept: FAMILY MEDICINE CLINIC | Facility: CLINIC | Age: 46
End: 2021-11-03

## 2021-11-03 ENCOUNTER — OFFICE VISIT (OUTPATIENT)
Dept: INTERNAL MEDICINE CLINIC | Facility: CLINIC | Age: 46
End: 2021-11-03

## 2021-11-03 VITALS
SYSTOLIC BLOOD PRESSURE: 115 MMHG | HEIGHT: 63 IN | WEIGHT: 256 LBS | BODY MASS INDEX: 45.36 KG/M2 | DIASTOLIC BLOOD PRESSURE: 74 MMHG | HEART RATE: 98 BPM

## 2021-11-03 DIAGNOSIS — M54.9 MID BACK PAIN ON LEFT SIDE: Primary | ICD-10-CM

## 2021-11-03 PROCEDURE — 99213 OFFICE O/P EST LOW 20 MIN: CPT | Performed by: INTERNAL MEDICINE

## 2021-11-03 PROCEDURE — 3078F DIAST BP <80 MM HG: CPT | Performed by: INTERNAL MEDICINE

## 2021-11-03 PROCEDURE — 3008F BODY MASS INDEX DOCD: CPT | Performed by: INTERNAL MEDICINE

## 2021-11-03 PROCEDURE — 3074F SYST BP LT 130 MM HG: CPT | Performed by: INTERNAL MEDICINE

## 2021-11-03 RX ORDER — CYCLOBENZAPRINE HCL 5 MG
5 TABLET ORAL 2 TIMES DAILY PRN
Qty: 20 TABLET | Refills: 0 | Status: SHIPPED | OUTPATIENT
Start: 2021-11-03

## 2021-11-03 NOTE — TELEPHONE ENCOUNTER
Action Requested: Summary for Provider     []  Critical Lab, Recommendations Needed  [] Need Additional Advice  [x]   FYI    []   Need Orders  [] Need Medications Sent to Pharmacy  []  Other     SUMMARY: Since 10/30/2021 patient has been having upper/mid b

## 2021-11-03 NOTE — PROGRESS NOTES
Subjective:     Patient ID: Jyoti Louise is a 40year old female. HPI She came in today complaining of left-sided mid back pain. According to the patient her symptoms started on Friday. She does not remember how did that happen.  She states that she s tablet 4   • atorvastatin 40 MG Oral Tab TAKE 1 TABLET BY MOUTH EVERY DAY AT NIGHT 90 tablet 4   • Insulin Pen Needle (CAREFINE PEN NEEDLES) 32G X 4 MM Does not apply Misc Inject three times a day.  300 each 0   • levonorgestrel 20 MCG/24HR Intrauterine IUD Social History    Tobacco Use      Smoking status: Never Smoker      Smokeless tobacco: Never Used    Vaping Use      Vaping Use: Never used    Alcohol use: No      Alcohol/week: 0.0 standard drinks    Drug use: No       Objective:   Physical Exam  Vitals

## 2021-11-03 NOTE — PATIENT INSTRUCTIONS
Mid  back painCareful with back. Correct lifting with legs and not with back. Turn body completely to lift something from side. Back exercises.  Correct posture when sitting with feet flat on floor and back against chair and computer at eye level., flexeril

## 2021-11-04 RX ORDER — POLYETHYLENE GLYCOL 3350, SODIUM CHLORIDE, SODIUM BICARBONATE, POTASSIUM CHLORIDE 420; 11.2; 5.72; 1.48 G/4L; G/4L; G/4L; G/4L
POWDER, FOR SOLUTION ORAL
Qty: 4000 ML | Refills: 0 | Status: SHIPPED | OUTPATIENT
Start: 2021-11-04

## 2021-11-04 NOTE — PROGRESS NOTES
Called patient to help assist with scheduling procedure.      [facility]

## 2021-11-04 NOTE — PROGRESS NOTES
Thank you oDuglas Faustin! Recent annual medical exam office visit with Dr. Cb Pappas of 2/10/2021 reviewed.       GI schedulers –    Please schedule colonoscopy exam at [address] ???[facility] (Marcus Herrera) - HIGH BMI    BMI Readings from [facility]

## 2021-11-08 NOTE — PROGRESS NOTES
Scheduled for:  Colonoscopy 50693    Provider Name:  Dr. Bhavik Bertrand  Date:  2/15/2022  Location:  [facility]  Sedation:  MAC  Time:  9:30 am (pt is aware to arrive at 8:30 am)  Prep:  Split dose golytely  Meds/Allergies Reconciled?:  Physician reviewed  Diagnosis wit

## 2021-11-20 ENCOUNTER — LAB ENCOUNTER (OUTPATIENT)
Dept: LAB | Facility: HOSPITAL | Age: 46
End: 2021-11-20
Attending: NURSE PRACTITIONER
Payer: COMMERCIAL

## 2021-11-20 DIAGNOSIS — Z79.4 TYPE 2 DIABETES MELLITUS WITHOUT COMPLICATION, WITH LONG-TERM CURRENT USE OF INSULIN (HCC): ICD-10-CM

## 2021-11-20 DIAGNOSIS — E11.9 TYPE 2 DIABETES MELLITUS WITHOUT COMPLICATION, WITH LONG-TERM CURRENT USE OF INSULIN (HCC): ICD-10-CM

## 2021-11-20 PROCEDURE — 82570 ASSAY OF URINE CREATININE: CPT

## 2021-11-20 PROCEDURE — 80053 COMPREHEN METABOLIC PANEL: CPT

## 2021-11-20 PROCEDURE — 82043 UR ALBUMIN QUANTITATIVE: CPT

## 2021-11-20 PROCEDURE — 36415 COLL VENOUS BLD VENIPUNCTURE: CPT

## 2021-11-20 PROCEDURE — 80061 LIPID PANEL: CPT

## 2021-12-07 RX ORDER — DAPAGLIFLOZIN 10 MG/1
TABLET, FILM COATED ORAL
Qty: 90 TABLET | Refills: 1 | Status: SHIPPED | OUTPATIENT
Start: 2021-12-07

## 2021-12-09 RX ORDER — ATORVASTATIN CALCIUM 40 MG/1
TABLET, FILM COATED ORAL
Qty: 90 TABLET | Refills: 1 | Status: SHIPPED | OUTPATIENT
Start: 2021-12-09

## 2021-12-09 RX ORDER — METFORMIN HYDROCHLORIDE 500 MG/1
TABLET, EXTENDED RELEASE ORAL
Qty: 360 TABLET | Refills: 1 | Status: SHIPPED | OUTPATIENT
Start: 2021-12-09

## 2021-12-09 NOTE — TELEPHONE ENCOUNTER
Refill passed per [facility] protocol.      Requested Prescriptions   Pending Prescriptions Disp Refills    METFORMIN HCL  MG Oral Tablet 24 Hr [Pharmacy Med Name: METFORMIN HCL  MG TABLET] 360 tablet 4     Sig: TAKE FOUR TABLETS BY MOUTH Dermatology Roel Helton MD    Office Visit

## 2021-12-09 NOTE — TELEPHONE ENCOUNTER
See encounter, patient was just seen in the office on 11/3/21, will passed this protocol. Please review; protocol failed/no protocol.      Requested Prescriptions   Pending Prescriptions Disp Refills    METFORMIN HCL  MG Oral Tablet 24 Hr [Pharma Future Appointments         Provider Department Appt Notes    In 2 months ROXY, PROCEDURE [address] GI PROCEDURE Colon @ [address] [place] [address]

## 2022-01-03 RX ORDER — SEMAGLUTIDE 1.34 MG/ML
0.5 INJECTION, SOLUTION SUBCUTANEOUS WEEKLY
Qty: 4.5 ML | Refills: 0 | Status: SHIPPED | OUTPATIENT
Start: 2022-01-03

## 2022-01-24 RX ORDER — LISINOPRIL 5 MG/1
5 TABLET ORAL DAILY
Qty: 90 TABLET | Refills: 1 | Status: SHIPPED | OUTPATIENT
Start: 2022-01-24 | End: 2022-09-10

## 2022-01-24 NOTE — TELEPHONE ENCOUNTER
Refill passed per [facility] protocol.     Requested Prescriptions   Pending Prescriptions Disp Refills    LISINOPRIL 5 MG Oral Tab [Pharmacy Med Name: LISINOPRIL 5 MG TABLET] 90 tablet 1     Sig: TAKE 1 TABLET BY MOUTH EVERY DAY        Hypertensive Medications Protocol Failed - 1/24/2022  3:56 PM        Failed - Appointment in past 6 or next 3 months        Passed - CMP or BMP in past 12 months        Passed - GFR Non- > 50     Lab Results   Component Value Date    GFRNAA 110 11/20/2021                       Future Appointments         Provider Department Appt Notes    In 3 weeks ROXY PROCEDURE [address] GI PROCEDURE Colon @ [address] [place] [address]            Recent Outpatient Visits              2 months ago Mid back pain on left side    Saundra Neves Austin, MD    Office Visit    2 months ago Screening for colorectal cancer    [address] GI PROCEDURE    Nurse Only    2 months ago Type 2 diabetes mellitus without complication, with long-term current use of insulin [facility])    [place] [employer], [facility] Endocrinology JUVENCIO Galvan    Office Visit    6 months ago     [address] [place], [place]    Office Visit    6 months ago [facility] [place] nevus    [address] [facility] Aleisha Fonseca MD    Office Visit

## 2022-02-07 RX ORDER — INSULIN HUMAN 500 [IU]/ML
INJECTION, SOLUTION SUBCUTANEOUS
Qty: 24 ML | Refills: 2 | Status: SHIPPED | OUTPATIENT
Start: 2022-02-07

## 2022-02-09 ENCOUNTER — TELEPHONE (OUTPATIENT)
Dept: GASTROENTEROLOGY | Facility: CLINIC | Age: 47
End: 2022-02-09

## 2022-02-09 NOTE — TELEPHONE ENCOUNTER
Sd Linder,    Please advise. LOV 11/1/21.  Patient to return again in 6 months      Medications:    Metformin XR  2,000mg daily with breakfast   U500 120 units Breakfast, 120 unit with lunch and 125 unit dinner   ozempic 0.5mg once weekly   Farxiga 10mg once daily

## 2022-02-10 NOTE — TELEPHONE ENCOUNTER
I spoke to the pt and she will read the instructions on My Chart on how to take her diabetic medications prior to her procedure

## 2022-02-10 NOTE — TELEPHONE ENCOUNTER
[id number] Beatriz Valiente noted. Day before procedure 2/14:  - decrease Metformin XR  2,000 --> 1,000mg daily with breakfast   -decrease U500 insulin:   120 --> 48 units Breakfast   120 --> 48 unit with lunch    125 --> 50 unit dinner   -continue with ozempic 0.5mg once weekly   -hold for 2 days prior to procedure Farxiga 10mg once daily       Day of procedure 2/15:     Hold all diabetes medications until able to resume normal diet        Please instruct patient to monitor her blood sugars closely the day before and the day of the procedure. As we make adjustments to her medications and her diet being temporarily changed, fluctuations in blood sugars may occur. We recommend that she checks her blood sugar before meals and at bedtime the day before the procedure, and also check her blood sugar in the morning on the day the procedure is scheduled. If she develops blood sugars <70 or is having persistent readings >250, please ask patient to call clinic and notify me.

## 2022-02-12 ENCOUNTER — LAB ENCOUNTER (OUTPATIENT)
Dept: LAB | Facility: HOSPITAL | Age: 47
End: 2022-02-12
Attending: INTERNAL MEDICINE
Payer: COMMERCIAL

## 2022-02-12 DIAGNOSIS — Z01.818 PRE-OP TESTING: ICD-10-CM

## 2022-02-12 LAB — SARS-COV-2 RNA RESP QL NAA+PROBE: NOT DETECTED

## 2022-02-15 ENCOUNTER — HOSPITAL ENCOUNTER (OUTPATIENT)
Facility: HOSPITAL | Age: 47
Setting detail: HOSPITAL OUTPATIENT SURGERY
Discharge: HOME | End: 2022-02-15
Attending: INTERNAL MEDICINE | Admitting: INTERNAL MEDICINE
Payer: COMMERCIAL

## 2022-02-15 ENCOUNTER — ANESTHESIA EVENT (OUTPATIENT)
Dept: ENDOSCOPY | Facility: HOSPITAL | Age: 47
End: 2022-02-15
Payer: COMMERCIAL

## 2022-02-15 ENCOUNTER — ANESTHESIA (OUTPATIENT)
Dept: ENDOSCOPY | Facility: HOSPITAL | Age: 47
End: 2022-02-15
Payer: COMMERCIAL

## 2022-02-15 VITALS
RESPIRATION RATE: 18 BRPM | SYSTOLIC BLOOD PRESSURE: 124 MMHG | TEMPERATURE: 98 F | BODY MASS INDEX: 42.52 KG/M2 | WEIGHT: 240 LBS | OXYGEN SATURATION: 98 % | HEIGHT: 63 IN | HEART RATE: 87 BPM | DIASTOLIC BLOOD PRESSURE: 89 MMHG

## 2022-02-15 DIAGNOSIS — Z12.12 SCREENING FOR COLORECTAL CANCER: ICD-10-CM

## 2022-02-15 DIAGNOSIS — Z01.818 PRE-OP TESTING: Primary | ICD-10-CM

## 2022-02-15 DIAGNOSIS — Z12.11 SCREENING FOR COLORECTAL CANCER: ICD-10-CM

## 2022-02-15 LAB
B-HCG UR QL: NEGATIVE
GLUCOSE BLDC GLUCOMTR-MCNC: 171 MG/DL (ref 70–99)

## 2022-02-15 PROCEDURE — 0DBN8ZX EXCISION OF SIGMOID COLON, VIA NATURAL OR ARTIFICIAL OPENING ENDOSCOPIC, DIAGNOSTIC: ICD-10-PCS | Performed by: INTERNAL MEDICINE

## 2022-02-15 PROCEDURE — 0DBH8ZX EXCISION OF CECUM, VIA NATURAL OR ARTIFICIAL OPENING ENDOSCOPIC, DIAGNOSTIC: ICD-10-PCS | Performed by: INTERNAL MEDICINE

## 2022-02-15 PROCEDURE — 45385 COLONOSCOPY W/LESION REMOVAL: CPT | Performed by: INTERNAL MEDICINE

## 2022-02-15 PROCEDURE — 0DBL8ZX EXCISION OF TRANSVERSE COLON, VIA NATURAL OR ARTIFICIAL OPENING ENDOSCOPIC, DIAGNOSTIC: ICD-10-PCS | Performed by: INTERNAL MEDICINE

## 2022-02-15 PROCEDURE — 0DBP8ZX EXCISION OF RECTUM, VIA NATURAL OR ARTIFICIAL OPENING ENDOSCOPIC, DIAGNOSTIC: ICD-10-PCS | Performed by: INTERNAL MEDICINE

## 2022-02-15 RX ORDER — NICOTINE POLACRILEX 4 MG
15 LOZENGE BUCCAL
OUTPATIENT
Start: 2022-02-15

## 2022-02-15 RX ORDER — NALOXONE HYDROCHLORIDE 0.4 MG/ML
80 INJECTION, SOLUTION INTRAMUSCULAR; INTRAVENOUS; SUBCUTANEOUS AS NEEDED
OUTPATIENT
Start: 2022-02-15 | End: 2022-02-15

## 2022-02-15 RX ORDER — NICOTINE POLACRILEX 4 MG
30 LOZENGE BUCCAL
OUTPATIENT
Start: 2022-02-15

## 2022-02-15 RX ORDER — SODIUM CHLORIDE, SODIUM LACTATE, POTASSIUM CHLORIDE, CALCIUM CHLORIDE 600; 310; 30; 20 MG/100ML; MG/100ML; MG/100ML; MG/100ML
INJECTION, SOLUTION INTRAVENOUS CONTINUOUS
OUTPATIENT
Start: 2022-02-15

## 2022-02-15 RX ORDER — DEXTROSE MONOHYDRATE 25 G/50ML
50 INJECTION, SOLUTION INTRAVENOUS
OUTPATIENT
Start: 2022-02-15

## 2022-02-15 RX ORDER — SODIUM CHLORIDE, SODIUM LACTATE, POTASSIUM CHLORIDE, CALCIUM CHLORIDE 600; 310; 30; 20 MG/100ML; MG/100ML; MG/100ML; MG/100ML
INJECTION, SOLUTION INTRAVENOUS CONTINUOUS
Status: DISCONTINUED | OUTPATIENT
Start: 2022-02-15 | End: 2022-02-15

## 2022-02-15 RX ADMIN — SODIUM CHLORIDE, SODIUM LACTATE, POTASSIUM CHLORIDE, CALCIUM CHLORIDE: 600; 310; 30; 20 INJECTION, SOLUTION INTRAVENOUS at 09:48:00

## 2022-02-15 RX ADMIN — SODIUM CHLORIDE, SODIUM LACTATE, POTASSIUM CHLORIDE, CALCIUM CHLORIDE: 600; 310; 30; 20 INJECTION, SOLUTION INTRAVENOUS at 10:25:00

## 2022-03-07 ENCOUNTER — TELEPHONE (OUTPATIENT)
Dept: GASTROENTEROLOGY | Facility: CLINIC | Age: 47
End: 2022-03-07

## 2022-03-29 NOTE — TELEPHONE ENCOUNTER
LOV 11/1/21, RTC 6 months, no f/u dillan'd, [facility]    Pended 3 month suppy

## 2022-03-31 RX ORDER — SEMAGLUTIDE 1.34 MG/ML
0.5 INJECTION, SOLUTION SUBCUTANEOUS WEEKLY
Qty: 4.5 ML | Refills: 0 | Status: SHIPPED | OUTPATIENT
Start: 2022-03-31

## 2022-05-09 ENCOUNTER — NURSE TRIAGE (OUTPATIENT)
Dept: FAMILY MEDICINE CLINIC | Facility: CLINIC | Age: 47
End: 2022-05-09

## 2022-05-09 NOTE — TELEPHONE ENCOUNTER
----- Message from Tres Candelario RN sent at 5/9/2022  2:24 PM CDT -----  Regarding: FW: Top of foot pain      ----- Message -----  From: Yury Richmond  Sent: 5/9/2022   1:47 PM CDT  To: Carmella Rich Triage  Subject: Top of foot pain                                 Hello - I have been having pain on top of my left foot, close to the base of the 2nd and 3rd toes. It doesn't hurt constantly but it has been getting stronger and sometimes kind of like a burning pain, even when not walking. I have tried icing it which helps a little, ibuprofen/tylenol did not do anything. Just wondering what I should do next? Thank you!

## 2022-05-10 ENCOUNTER — OFFICE VISIT (OUTPATIENT)
Dept: FAMILY MEDICINE CLINIC | Facility: CLINIC | Age: 47
End: 2022-05-10
Payer: COMMERCIAL

## 2022-05-10 VITALS
HEIGHT: 63 IN | WEIGHT: 254 LBS | SYSTOLIC BLOOD PRESSURE: 126 MMHG | HEART RATE: 76 BPM | BODY MASS INDEX: 45 KG/M2 | DIASTOLIC BLOOD PRESSURE: 87 MMHG

## 2022-05-10 DIAGNOSIS — M79.672 LEFT FOOT PAIN: Primary | ICD-10-CM

## 2022-05-10 PROCEDURE — 3074F SYST BP LT 130 MM HG: CPT

## 2022-05-10 PROCEDURE — 3079F DIAST BP 80-89 MM HG: CPT

## 2022-05-10 PROCEDURE — 3008F BODY MASS INDEX DOCD: CPT

## 2022-05-10 PROCEDURE — 99213 OFFICE O/P EST LOW 20 MIN: CPT

## 2022-06-10 RX ORDER — SEMAGLUTIDE 1.34 MG/ML
0.5 INJECTION, SOLUTION SUBCUTANEOUS WEEKLY
Qty: 4.5 ML | Refills: 0 | Status: SHIPPED | OUTPATIENT
Start: 2022-06-10

## 2022-06-10 RX ORDER — INSULIN HUMAN 500 [IU]/ML
INJECTION, SOLUTION SUBCUTANEOUS
Qty: 24 ML | Refills: 2 | Status: SHIPPED | OUTPATIENT
Start: 2022-06-10

## 2022-07-07 ENCOUNTER — TELEPHONE (OUTPATIENT)
Dept: CASE MANAGEMENT | Age: 47
End: 2022-07-07

## 2022-07-19 ENCOUNTER — HOSPITAL ENCOUNTER (OUTPATIENT)
Dept: GENERAL RADIOLOGY | Facility: HOSPITAL | Age: 47
Discharge: HOME | End: 2022-07-19
Attending: PODIATRIST
Payer: COMMERCIAL

## 2022-07-19 ENCOUNTER — OFFICE VISIT (OUTPATIENT)
Dept: PODIATRY CLINIC | Facility: CLINIC | Age: 47
End: 2022-07-19
Payer: COMMERCIAL

## 2022-07-19 DIAGNOSIS — M79.672 PAIN IN BOTH FEET: Primary | ICD-10-CM

## 2022-07-19 DIAGNOSIS — M77.42 METATARSALGIA OF BOTH FEET: ICD-10-CM

## 2022-07-19 DIAGNOSIS — M79.671 PAIN IN BOTH FEET: Primary | ICD-10-CM

## 2022-07-19 DIAGNOSIS — M79.672 PAIN IN BOTH FEET: ICD-10-CM

## 2022-07-19 DIAGNOSIS — M79.671 PAIN IN BOTH FEET: ICD-10-CM

## 2022-07-19 DIAGNOSIS — M77.41 METATARSALGIA OF BOTH FEET: ICD-10-CM

## 2022-07-19 PROCEDURE — 73630 X-RAY EXAM OF FOOT: CPT | Performed by: PODIATRIST

## 2022-07-19 PROCEDURE — 99243 OFF/OP CNSLTJ NEW/EST LOW 30: CPT | Performed by: PODIATRIST

## 2022-09-07 RX ORDER — SEMAGLUTIDE 1.34 MG/ML
0.5 INJECTION, SOLUTION SUBCUTANEOUS WEEKLY
Qty: 4.5 ML | Refills: 0 | Status: SHIPPED | OUTPATIENT
Start: 2022-09-07

## 2022-09-10 RX ORDER — METFORMIN HYDROCHLORIDE 500 MG/1
TABLET, EXTENDED RELEASE ORAL
Qty: 360 TABLET | Refills: 1 | Status: SHIPPED | OUTPATIENT
Start: 2022-09-10 | End: 2023-03-22

## 2022-09-10 RX ORDER — LISINOPRIL 5 MG/1
5 TABLET ORAL DAILY
Qty: 90 TABLET | Refills: 1 | Status: SHIPPED | OUTPATIENT
Start: 2022-09-10 | End: 2023-03-22

## 2022-09-10 RX ORDER — ATORVASTATIN CALCIUM 40 MG/1
TABLET, FILM COATED ORAL
Qty: 90 TABLET | Refills: 1 | Status: SHIPPED | OUTPATIENT
Start: 2022-09-10 | End: 2023-03-22

## 2022-09-10 NOTE — TELEPHONE ENCOUNTER
Refill passed per [facility] protocol. Requested Prescriptions   Pending Prescriptions Disp Refills    ATORVASTATIN 40 MG Oral Tab [Pharmacy Med Name: ATORVASTATIN 40 MG TABLET] 90 tablet 1     Sig: TAKE 1 TABLET BY MOUTH EVERY DAY AT NIGHT        Cholesterol Medication Protocol Passed - 9/10/2022 12:20 AM        Passed - ALT in past 12 months        Passed - LDL in past 12 months        Passed - Last ALT < 80       Lab Results   Component Value Date    ALT 29 11/20/2021             Passed - Last LDL < 130     Lab Results   Component Value Date    LDL 74 11/20/2021               Passed - In person appointment or virtual visit in the past 12 mos or appointment in next 3 mos       Recent Outpatient Visits              1 month ago Pain in both [address] [facility], [address] [facility],[address], [facility] [place], [place]    Office Visit    4 months ago Left foot pain    [facility], [address] Becki [place], APRN    Office Visit    10 months ago Mid back pain on left side    [facility], MD Rubén    Office Visit    10 months ago Screening for colorectal cancer    [facility] GI PROCEDURE    Nurse Only    10 months ago Type 2 diabetes mellitus without complication, with long-term current use of insulin [facility])    [place] [employer] [facility] Endocrinology JUVENCIO Campos    Office Visit     Future Appointments         Provider Department Appt Notes    In 1 month Mouna Vann, [facility] Endocrinology Follow up    In 2 months Mayra Orr MD [place] [employer] [facility], [address], [address] [facility] Annual visit                  LISINOPRIL 5 MG Oral Tab [Pharmacy Med Name: LISINOPRIL 5 MG TABLET] 90 tablet 1     Sig: Take 1 tablet (5 mg total) by mouth daily. Hypertensive Medications Protocol Failed - 9/10/2022 12:20 AM        Failed - CMP or BMP in past 6 months     No results found for this or any previous visit (from the past 4392 hour(s)). Failed - In person appointment or virtual visit in the past 6 months       Recent Outpatient Visits              1 month ago Pain in both [facility] [place] [facility], [address], [address] [place], [place]    Office Visit    4 months ago Left foot pain    [address], [place], [place], APRN    Office Visit    10 months ago Mid back pain on left side    [place], SaundraRubén MD    Office Visit    10 months ago Screening for colorectal cancer    [address] GI PROCEDURE    Nurse Only    10 months ago Type 2 diabetes mellitus without complication, with long-term current use of insulin [facility])    [facility], [facility] Endocrinology JUVENCIO Kovacs    Office Visit     Future Appointments         Provider Department Appt Notes    In 1 month KEITH Kovacs Endocrinology Follow up    In 2 months Chicho Lopez MD [place] [employer] [place], [facility], [address], [place] Annual visit               Passed - In person appointment in the past 12 or next 3 months       Recent Outpatient Visits              1 month ago Pain in both [facility], [address] [place], [place]    Office Visit    4 months ago Left foot pain    [place] [facility] [place], [facility], [address], [place], Malik, APRN    Office Visit    10 months ago Mid back pain on left side    [place], [place] Arguello MD    Office Visit    10 months ago Screening for colorectal cancer    [address] GI PROCEDURE    Nurse Only    10 months ago Type 2 diabetes mellitus without complication, with long-term current use of insulin [facility])    [facility], [facility] Endocrinology JUVENCIO Kovacs    Office Visit     Future Appointments         Provider Department Appt Notes    In 1 month KEITH Kovacs Endocrinology Follow up    In 2 months Chicho Lopez MD [place] [facility], [address], [place] Annual visit Passed - Last BP reading less than 140/90     BP Readings from Last 1 Encounters:  05/10/22 : 126/87                Passed - GFR > 50     No results found for: EGFRCR                 METFORMIN  MG Oral Tablet 24 Hr [Pharmacy Med Name: METFORMIN HCL  MG TABLET] 360 tablet 1     Sig: TAKE FOUR TABLETS BY MOUTH DAILY WITH BREAKFAST.         Diabetes Medication Protocol Failed - 9/10/2022 12:20 AM        Failed - Last A1C < 7.5 and within past 6 months     Lab Results   Component Value Date    A1C 6.4 (A) 11/01/2021               Failed - GFR in the past 12 months        Passed - In person appointment or virtual visit in the past 6 mos or appointment in next 3 mos       Recent Outpatient Visits              1 month ago Pain in both [address] [place] [address] [facility] [employer], [address], [facility] [place], [place]    Office Visit    4 months ago Left foot pain    [address], [address] [place], APRN    Office Visit    10 months ago Mid back pain on left side    Saundra García Austin, MD    Office Visit    10 months ago Screening for colorectal cancer    [address] GI PROCEDURE    Nurse Only    10 months ago Type 2 diabetes mellitus without complication, with long-term current use of insulin [facility])    [address] Endocrinology JUVENCIO Portillo    Office Visit     Future Appointments         Provider Department Appt Notes    In 1 month [employer] Endocrinology Follow up    In 2 months Luis Fernando Davis MD [address], [address], [address] [facility] Annual visit               Passed - GFR > 50     No results found for: [facility]                  Recent Outpatient Visits              1 month ago [facility] both [facility] [employer], [address], [address] [place], [place]    Office Visit    4 months ago Left foot pain    [address] Can[address] [place], APRMARIAH Office Visit    10 months ago Mid back pain on left side    [facility], MD Rubén    Office Visit    10 months ago Screening for colorectal cancer    [address] GI PROCEDURE    Nurse Only    10 months ago Type 2 diabetes mellitus without complication, with long-term current use of insulin [facility])    [address] Endocrinology JUVENCIO Jones    Office Visit          Future Appointments         Provider Department Appt Notes    In 1 month [employer] Endocrinology Follow up    In 2 months Dieter Miller MD [address], [place], [address] [place] [address] Annual visit

## 2022-09-12 RX ORDER — DAPAGLIFLOZIN 10 MG/1
TABLET, FILM COATED ORAL
Qty: 90 TABLET | Refills: 1 | Status: SHIPPED | OUTPATIENT
Start: 2022-09-12

## 2022-10-06 RX ORDER — INSULIN HUMAN 500 [IU]/ML
INJECTION, SOLUTION SUBCUTANEOUS
Qty: 24 ML | Refills: 2 | Status: SHIPPED | OUTPATIENT
Start: 2022-10-06

## 2022-10-06 NOTE — TELEPHONE ENCOUNTER
LOV:  11/1/2021    RTC: 6 months     F/U: 10/11/2022    RODDY Chavira-- orders pending, approve if appropriate

## 2022-10-12 ENCOUNTER — TELEPHONE (OUTPATIENT)
Dept: FAMILY MEDICINE CLINIC | Facility: CLINIC | Age: 47
End: 2022-10-12

## 2022-10-12 ENCOUNTER — TELEPHONE (OUTPATIENT)
Dept: CASE MANAGEMENT | Age: 47
End: 2022-10-12

## 2022-10-12 DIAGNOSIS — Z79.4 TYPE 2 DIABETES MELLITUS WITHOUT COMPLICATION, WITH LONG-TERM CURRENT USE OF INSULIN (HCC): Primary | ICD-10-CM

## 2022-10-12 DIAGNOSIS — E11.9 TYPE 2 DIABETES MELLITUS WITHOUT COMPLICATION, WITH LONG-TERM CURRENT USE OF INSULIN (HCC): Primary | ICD-10-CM

## 2022-10-12 NOTE — TELEPHONE ENCOUNTER
Dr. Devyn Joseph,    The patient is requesting a referral to Dr. Delphine Mondragon for follow up visits    Pended referral please review diagnosis and sign off if you agree. Thank you.   Robert Macario

## 2022-10-13 ENCOUNTER — HOSPITAL ENCOUNTER (OUTPATIENT)
Dept: MAMMOGRAPHY | Facility: HOSPITAL | Age: 47
Discharge: HOME | End: 2022-10-13
Attending: OBSTETRICS & GYNECOLOGY
Payer: COMMERCIAL

## 2022-10-13 DIAGNOSIS — Z12.31 ENCOUNTER FOR SCREENING MAMMOGRAM FOR MALIGNANT NEOPLASM OF BREAST: ICD-10-CM

## 2022-10-13 PROCEDURE — 77063 BREAST TOMOSYNTHESIS BI: CPT | Performed by: OBSTETRICS & GYNECOLOGY

## 2022-10-13 PROCEDURE — 77067 SCR MAMMO BI INCL CAD: CPT | Performed by: OBSTETRICS & GYNECOLOGY

## 2022-10-14 ENCOUNTER — PATIENT MESSAGE (OUTPATIENT)
Dept: OTHER | Age: 47
End: 2022-10-14

## 2022-10-14 NOTE — TELEPHONE ENCOUNTER
From: Boyd Moe  To: Jameson Cheadle  Sent: 10/12/2022 4:25 PM CDT  Subject: Endocrinology    Holly Kay like you have an appointment with Charley Woo in November. The referral was placed for that visit already.   Mela Duggan

## 2022-11-09 ENCOUNTER — TELEPHONE (OUTPATIENT)
Dept: OPHTHALMOLOGY | Facility: CLINIC | Age: 47
End: 2022-11-09

## 2022-11-09 NOTE — TELEPHONE ENCOUNTER
Spoke with patient. She says she goes to an outside eye doctor. She has an appointment for a dilated eye exam elsewhere in December 2022. She says she will get the notes from her visit to her primary care doctor.

## 2022-11-16 ENCOUNTER — OFFICE VISIT (OUTPATIENT)
Dept: FAMILY MEDICINE CLINIC | Facility: CLINIC | Age: 47
End: 2022-11-16
Payer: COMMERCIAL

## 2022-11-16 VITALS
SYSTOLIC BLOOD PRESSURE: 120 MMHG | DIASTOLIC BLOOD PRESSURE: 80 MMHG | HEART RATE: 108 BPM | HEIGHT: 63 IN | BODY MASS INDEX: 44.26 KG/M2 | TEMPERATURE: 97 F | WEIGHT: 249.81 LBS

## 2022-11-16 DIAGNOSIS — E78.5 DYSLIPIDEMIA: ICD-10-CM

## 2022-11-16 DIAGNOSIS — E11.9 TYPE 2 DIABETES MELLITUS WITHOUT COMPLICATION, WITH LONG-TERM CURRENT USE OF INSULIN (HCC): Primary | ICD-10-CM

## 2022-11-16 DIAGNOSIS — F41.9 ANXIETY: ICD-10-CM

## 2022-11-16 DIAGNOSIS — Z79.4 TYPE 2 DIABETES MELLITUS WITHOUT COMPLICATION, WITH LONG-TERM CURRENT USE OF INSULIN (HCC): Primary | ICD-10-CM

## 2022-11-16 DIAGNOSIS — Z00.00 ROUTINE MEDICAL EXAM: ICD-10-CM

## 2022-11-16 DIAGNOSIS — E28.2 PCOS (POLYCYSTIC OVARIAN SYNDROME): ICD-10-CM

## 2022-11-16 PROCEDURE — 3079F DIAST BP 80-89 MM HG: CPT | Performed by: FAMILY MEDICINE

## 2022-11-16 PROCEDURE — 3074F SYST BP LT 130 MM HG: CPT | Performed by: FAMILY MEDICINE

## 2022-11-16 PROCEDURE — 3008F BODY MASS INDEX DOCD: CPT | Performed by: FAMILY MEDICINE

## 2022-11-16 PROCEDURE — 99396 PREV VISIT EST AGE 40-64: CPT | Performed by: FAMILY MEDICINE

## 2022-11-29 ENCOUNTER — OFFICE VISIT (OUTPATIENT)
Dept: ENDOCRINOLOGY CLINIC | Facility: CLINIC | Age: 47
End: 2022-11-29
Payer: COMMERCIAL

## 2022-11-29 VITALS
BODY MASS INDEX: 44 KG/M2 | SYSTOLIC BLOOD PRESSURE: 113 MMHG | WEIGHT: 248 LBS | HEART RATE: 101 BPM | DIASTOLIC BLOOD PRESSURE: 82 MMHG

## 2022-11-29 DIAGNOSIS — E11.9 TYPE 2 DIABETES MELLITUS WITHOUT COMPLICATION, WITH LONG-TERM CURRENT USE OF INSULIN (HCC): Primary | ICD-10-CM

## 2022-11-29 DIAGNOSIS — Z79.4 TYPE 2 DIABETES MELLITUS WITHOUT COMPLICATION, WITH LONG-TERM CURRENT USE OF INSULIN (HCC): Primary | ICD-10-CM

## 2022-11-29 LAB
CARTRIDGE LOT#: NORMAL NUMERIC
GLUCOSE BLOOD: 95
TEST STRIP LOT #: NORMAL NUMERIC

## 2022-11-29 PROCEDURE — 3079F DIAST BP 80-89 MM HG: CPT | Performed by: NURSE PRACTITIONER

## 2022-11-29 PROCEDURE — 3044F HG A1C LEVEL LT 7.0%: CPT | Performed by: NURSE PRACTITIONER

## 2022-11-29 PROCEDURE — 82947 ASSAY GLUCOSE BLOOD QUANT: CPT | Performed by: NURSE PRACTITIONER

## 2022-11-29 PROCEDURE — 3074F SYST BP LT 130 MM HG: CPT | Performed by: NURSE PRACTITIONER

## 2022-11-29 PROCEDURE — 83036 HEMOGLOBIN GLYCOSYLATED A1C: CPT | Performed by: NURSE PRACTITIONER

## 2022-11-29 PROCEDURE — 99213 OFFICE O/P EST LOW 20 MIN: CPT | Performed by: NURSE PRACTITIONER

## 2022-11-29 NOTE — PATIENT INSTRUCTIONS
A1C: 5.8% today --> decreased from 6.4% on 11/1/2021  Blood glucose: 95 in clinic today    Medications:   -continue with Metformin XR  2,000mg daily with breakfast   -continue with U500 120 units Breakfast, 120 unit with lunch and 125 unit dinner   -continue with Ozempic 0.5mg once weekly   -continue with Farxiga 10mg once daily in AM       Weight:  Wt Readings from Last 6 Encounters:  11/29/22 : 248 lb (112.5 kg)  11/16/22 : 249 lb 12.8 oz (113.3 kg)  05/10/22 : 254 lb (115.2 kg)  02/15/22 : 240 lb (108.9 kg)  11/03/21 : 256 lb (116.1 kg)  11/01/21 : 254 lb (115.2 kg)    A1C goal:  <7.0%    Blood sugar testing:  Test your blood sugar 3 times daily   Recommended times to test: Before breakfast (fasting), before lunch, before dinner     Blood sugar targets:  Before breakfast:   (preferably < 110)  2 hours after meals: <150    Call for persistent blood sugars < 75 or > 200

## 2022-12-05 RX ORDER — SEMAGLUTIDE 1.34 MG/ML
0.5 INJECTION, SOLUTION SUBCUTANEOUS WEEKLY
Qty: 4.5 ML | Refills: 1 | Status: SHIPPED | OUTPATIENT
Start: 2022-12-05

## 2022-12-07 ENCOUNTER — TELEPHONE (OUTPATIENT)
Dept: FAMILY MEDICINE CLINIC | Facility: CLINIC | Age: 47
End: 2022-12-07

## 2022-12-08 ENCOUNTER — LAB ENCOUNTER (OUTPATIENT)
Dept: LAB | Facility: HOSPITAL | Age: 47
End: 2022-12-08
Attending: FAMILY MEDICINE
Payer: COMMERCIAL

## 2022-12-08 DIAGNOSIS — E11.9 TYPE 2 DIABETES MELLITUS WITHOUT COMPLICATION, WITH LONG-TERM CURRENT USE OF INSULIN (HCC): ICD-10-CM

## 2022-12-08 DIAGNOSIS — Z79.4 TYPE 2 DIABETES MELLITUS WITHOUT COMPLICATION, WITH LONG-TERM CURRENT USE OF INSULIN (HCC): ICD-10-CM

## 2022-12-08 DIAGNOSIS — Z00.00 ROUTINE MEDICAL EXAM: ICD-10-CM

## 2022-12-08 LAB
ALBUMIN SERPL-MCNC: 3.4 G/DL (ref 3.4–5)
ALBUMIN/GLOB SERPL: 1 {RATIO} (ref 1–2)
ALP LIVER SERPL-CCNC: 93 U/L
ALT SERPL-CCNC: 31 U/L
ANION GAP SERPL CALC-SCNC: 7 MMOL/L (ref 0–18)
AST SERPL-CCNC: 14 U/L (ref 15–37)
BASOPHILS # BLD AUTO: 0.07 X10(3) UL (ref 0–0.2)
BASOPHILS NFR BLD AUTO: 0.7 %
BILIRUB SERPL-MCNC: 0.9 MG/DL (ref 0.1–2)
BUN BLD-MCNC: 12 MG/DL (ref 7–18)
BUN/CREAT SERPL: 18.8 (ref 10–20)
CALCIUM BLD-MCNC: 8.8 MG/DL (ref 8.5–10.1)
CHLORIDE SERPL-SCNC: 108 MMOL/L (ref 98–112)
CHOLEST SERPL-MCNC: 118 MG/DL (ref ?–200)
CO2 SERPL-SCNC: 25 MMOL/L (ref 21–32)
CREAT BLD-MCNC: 0.64 MG/DL
CREAT UR-SCNC: 183 MG/DL
DEPRECATED RDW RBC AUTO: 42 FL (ref 35.1–46.3)
EOSINOPHIL # BLD AUTO: 0.56 X10(3) UL (ref 0–0.7)
EOSINOPHIL NFR BLD AUTO: 5.6 %
ERYTHROCYTE [DISTWIDTH] IN BLOOD BY AUTOMATED COUNT: 12.8 % (ref 11–15)
EST. AVERAGE GLUCOSE BLD GHB EST-MCNC: 134 MG/DL (ref 68–126)
FASTING PATIENT LIPID ANSWER: YES
FASTING STATUS PATIENT QL REPORTED: YES
GFR SERPLBLD BASED ON 1.73 SQ M-ARVRAT: 110 ML/MIN/1.73M2 (ref 60–?)
GLOBULIN PLAS-MCNC: 3.4 G/DL (ref 2.8–4.4)
GLUCOSE BLD-MCNC: 133 MG/DL (ref 70–99)
HBA1C MFR BLD: 6.3 % (ref ?–5.7)
HCT VFR BLD AUTO: 47.3 %
HDLC SERPL-MCNC: 46 MG/DL (ref 40–59)
HGB BLD-MCNC: 15.5 G/DL
IMM GRANULOCYTES # BLD AUTO: 0.04 X10(3) UL (ref 0–1)
IMM GRANULOCYTES NFR BLD: 0.4 %
LDLC SERPL CALC-MCNC: 50 MG/DL (ref ?–100)
LYMPHOCYTES # BLD AUTO: 2.89 X10(3) UL (ref 1–4)
LYMPHOCYTES NFR BLD AUTO: 29 %
MCH RBC QN AUTO: 29.3 PG (ref 26–34)
MCHC RBC AUTO-ENTMCNC: 32.8 G/DL (ref 31–37)
MCV RBC AUTO: 89.4 FL
MICROALBUMIN UR-MCNC: 1.51 MG/DL
MICROALBUMIN/CREAT 24H UR-RTO: 8.3 UG/MG (ref ?–30)
MONOCYTES # BLD AUTO: 0.75 X10(3) UL (ref 0.1–1)
MONOCYTES NFR BLD AUTO: 7.5 %
NEUTROPHILS # BLD AUTO: 5.67 X10 (3) UL (ref 1.5–7.7)
NEUTROPHILS # BLD AUTO: 5.67 X10(3) UL (ref 1.5–7.7)
NEUTROPHILS NFR BLD AUTO: 56.8 %
NONHDLC SERPL-MCNC: 72 MG/DL (ref ?–130)
OSMOLALITY SERPL CALC.SUM OF ELEC: 292 MOSM/KG (ref 275–295)
PLATELET # BLD AUTO: 232 10(3)UL (ref 150–450)
POTASSIUM SERPL-SCNC: 4 MMOL/L (ref 3.5–5.1)
PROT SERPL-MCNC: 6.8 G/DL (ref 6.4–8.2)
RBC # BLD AUTO: 5.29 X10(6)UL
SODIUM SERPL-SCNC: 140 MMOL/L (ref 136–145)
TRIGL SERPL-MCNC: 127 MG/DL (ref 30–149)
TSI SER-ACNC: 2.09 MIU/ML (ref 0.36–3.74)
VIT B12 SERPL-MCNC: 503 PG/ML (ref 193–986)
VIT D+METAB SERPL-MCNC: 12.5 NG/ML (ref 30–100)
VLDLC SERPL CALC-MCNC: 18 MG/DL (ref 0–30)
WBC # BLD AUTO: 10 X10(3) UL (ref 4–11)

## 2022-12-08 PROCEDURE — 36415 COLL VENOUS BLD VENIPUNCTURE: CPT

## 2022-12-08 PROCEDURE — 82607 VITAMIN B-12: CPT

## 2022-12-08 PROCEDURE — 85025 COMPLETE CBC W/AUTO DIFF WBC: CPT

## 2022-12-08 PROCEDURE — 83036 HEMOGLOBIN GLYCOSYLATED A1C: CPT

## 2022-12-08 PROCEDURE — 84443 ASSAY THYROID STIM HORMONE: CPT

## 2022-12-08 PROCEDURE — 80061 LIPID PANEL: CPT

## 2022-12-08 PROCEDURE — 82043 UR ALBUMIN QUANTITATIVE: CPT

## 2022-12-08 PROCEDURE — 82570 ASSAY OF URINE CREATININE: CPT

## 2022-12-08 PROCEDURE — 80053 COMPREHEN METABOLIC PANEL: CPT

## 2022-12-08 PROCEDURE — 82306 VITAMIN D 25 HYDROXY: CPT

## 2022-12-12 RX ORDER — ERGOCALCIFEROL 1.25 MG/1
50000 CAPSULE ORAL WEEKLY
Qty: 12 CAPSULE | Refills: 4 | Status: SHIPPED | OUTPATIENT
Start: 2022-12-12 | End: 2023-01-11

## 2022-12-13 NOTE — PROGRESS NOTES
Labs look good except for low vitamin D levels. I will start you on weekly high dose vitamin D 50,000 units.  Diabetes is very well controlled - great job! - Dr. Oneil Steiner

## 2022-12-21 ENCOUNTER — OFFICE VISIT (OUTPATIENT)
Dept: PODIATRY CLINIC | Facility: CLINIC | Age: 47
End: 2022-12-21
Payer: COMMERCIAL

## 2022-12-21 DIAGNOSIS — M77.42 METATARSALGIA OF BOTH FEET: Primary | ICD-10-CM

## 2022-12-21 DIAGNOSIS — M77.41 METATARSALGIA OF BOTH FEET: Primary | ICD-10-CM

## 2022-12-21 PROCEDURE — 99213 OFFICE O/P EST LOW 20 MIN: CPT | Performed by: PODIATRIST

## 2022-12-26 NOTE — TELEPHONE ENCOUNTER
Please review. Protocol Failed or has No Protocol. To provider in office for Dr. Shanae Sams.     Requested Prescriptions   Pending Prescriptions Disp Refills    TRAZODONE 50 MG Oral Tab [Pharmacy Med Name: TRAZODONE 50 MG TABLET] 90 tablet 1     Sig: TAKE 1 TABLET BY MOUTH NIGHTLY AS NEEDED FOR SLEEP       There is no refill protocol information for this order          Future Appointments         Provider Department Appt Notes    In 1 week Gentry Kaur MD [facility] Dermatology skin lesion             Recent Outpatient Visits              5 days ago Metatarsalgia of both [address] [place] [address], [address], [address] , [facility] [place], [place]    Office Visit    3 weeks ago Type 2 diabetes mellitus without complication, with long-term current use of insulin [facility])    [facility] [facility], [address] [place] [address], Jenn Barajas, JUVENCIO    Office Visit    1 month ago Type 2 diabetes mellitus without complication, with long-term current use of insulin [facility])    [facility], [address], Sanjana Ashraf MD    Office Visit    5 months ago Pain in both [address] [place] [address], [address], [address], Larri [place], DPM    Office Visit    7 months ago Left foot pain    [address], Paula Uribe APRN    Office Visit

## 2022-12-27 RX ORDER — TRAZODONE HYDROCHLORIDE 50 MG/1
TABLET ORAL
Qty: 90 TABLET | Refills: 1 | Status: SHIPPED | OUTPATIENT
Start: 2022-12-27

## 2022-12-30 ENCOUNTER — PATIENT MESSAGE (OUTPATIENT)
Dept: FAMILY MEDICINE CLINIC | Facility: CLINIC | Age: 47
End: 2022-12-30

## 2023-01-05 PROBLEM — F41.9 ANXIETY: Status: ACTIVE | Noted: 2023-01-05

## 2023-01-05 NOTE — TELEPHONE ENCOUNTER
From: JUVENCIO Brannon  To: Anirudh Bull  Sent: 12/30/2022 12:27 PM CST  Subject: referral    Your referral has been placed-Please let me know if you have any questions or concerns.    RODDY Brannon, FNP-C

## 2023-01-06 ENCOUNTER — OFFICE VISIT (OUTPATIENT)
Dept: DERMATOLOGY CLINIC | Facility: CLINIC | Age: 48
End: 2023-01-06
Payer: COMMERCIAL

## 2023-01-06 DIAGNOSIS — D23.5 BENIGN NEOPLASM OF SKIN OF TRUNK: ICD-10-CM

## 2023-01-06 DIAGNOSIS — D23.9 BLUE NEVUS: ICD-10-CM

## 2023-01-06 DIAGNOSIS — D22.9 MULTIPLE NEVI: ICD-10-CM

## 2023-01-06 DIAGNOSIS — L72.0 EPIDERMAL CYST: Primary | ICD-10-CM

## 2023-01-06 DIAGNOSIS — D23.4 BENIGN NEOPLASM OF SCALP AND SKIN OF NECK: ICD-10-CM

## 2023-01-06 DIAGNOSIS — D23.30 BENIGN NEOPLASM OF SKIN OF FACE: ICD-10-CM

## 2023-01-06 DIAGNOSIS — D18.01 CHERRY ANGIOMA: ICD-10-CM

## 2023-01-06 DIAGNOSIS — D23.60 BENIGN NEOPLASM OF SKIN OF UPPER LIMB, INCLUDING SHOULDER, UNSPECIFIED LATERALITY: ICD-10-CM

## 2023-01-06 PROCEDURE — 99213 OFFICE O/P EST LOW 20 MIN: CPT | Performed by: DERMATOLOGY

## 2023-01-17 ENCOUNTER — TELEPHONE (OUTPATIENT)
Dept: PODIATRY CLINIC | Facility: CLINIC | Age: 48
End: 2023-01-17

## 2023-01-17 NOTE — TELEPHONE ENCOUNTER
LVM with patient that orthotics have arrived at [facility] and to call office to schedule an appointment for pick-up.

## 2023-02-03 NOTE — TELEPHONE ENCOUNTER
LOV: 11/29/22    RTC:6months    F/U:No FU     Pending Monthly Supply: orders pending, please approve if appropriate.

## 2023-02-06 RX ORDER — INSULIN HUMAN 500 [IU]/ML
INJECTION, SOLUTION SUBCUTANEOUS
Qty: 24 ML | Refills: 2 | Status: SHIPPED | OUTPATIENT
Start: 2023-02-06

## 2023-02-10 ENCOUNTER — TELEPHONE (OUTPATIENT)
Dept: OBGYN | Age: 48
End: 2023-02-10

## 2023-03-22 RX ORDER — DAPAGLIFLOZIN 10 MG/1
TABLET, FILM COATED ORAL
Qty: 90 TABLET | Refills: 1 | Status: SHIPPED | OUTPATIENT
Start: 2023-03-22

## 2023-04-18 ENCOUNTER — PATIENT MESSAGE (OUTPATIENT)
Dept: FAMILY MEDICINE CLINIC | Facility: CLINIC | Age: 48
End: 2023-04-18

## 2023-04-20 ENCOUNTER — NURSE TRIAGE (OUTPATIENT)
Dept: FAMILY MEDICINE CLINIC | Facility: CLINIC | Age: 48
End: 2023-04-20

## 2023-04-20 ENCOUNTER — TELEPHONE (OUTPATIENT)
Dept: FAMILY MEDICINE CLINIC | Facility: CLINIC | Age: 48
End: 2023-04-20

## 2023-04-20 DIAGNOSIS — H53.8 CLOUDY VISION: Primary | ICD-10-CM

## 2023-04-20 NOTE — TELEPHONE ENCOUNTER
Spoke to patient. Pt complains of blurry vision right eye in the center of her vision (worse with her glasses on) for the past week. Pt denies pain in her eyes, redness or irritation. Pt denies floaters and flashes in her vision. Offered patient an appointment this afternoon but patient had to work. Pt scheduled for Tues 4/25/23 @ 8:00am. Pt will call back if symptoms worsen before her appointment. I advised patient that the appointment will be up to 2 hours and that their eyes will be dilated. Patient should bring sunglasses and someone to drive if they think they will be uncomfortable driving. They should bring their current glasses and any eye drops that they take.

## 2023-04-25 ENCOUNTER — OFFICE VISIT (OUTPATIENT)
Dept: OPHTHALMOLOGY | Facility: CLINIC | Age: 48
End: 2023-04-25

## 2023-04-25 DIAGNOSIS — E11.9 DIABETES MELLITUS TYPE 2 WITHOUT RETINOPATHY (HCC): ICD-10-CM

## 2023-04-25 DIAGNOSIS — H25.12 AGE-RELATED NUCLEAR CATARACT, LEFT: ICD-10-CM

## 2023-04-25 DIAGNOSIS — H25.041 POSTERIOR SUBCAPSULAR AGE-RELATED CATARACT OF RIGHT EYE: Primary | ICD-10-CM

## 2023-04-25 PROCEDURE — 92015 DETERMINE REFRACTIVE STATE: CPT | Performed by: OPHTHALMOLOGY

## 2023-04-25 PROCEDURE — 92004 COMPRE OPH EXAM NEW PT 1/>: CPT | Performed by: OPHTHALMOLOGY

## 2023-04-25 PROCEDURE — 2023F DILAT RTA XM W/O RTNOPTHY: CPT | Performed by: OPHTHALMOLOGY

## 2023-04-25 NOTE — ASSESSMENT & PLAN NOTE
Discussed with patient that cataract in the right eye is advanced enough at this time to consider surgery; it would be patient's choice. Discussed options such as surgery or change of glasses RX. Discussed surgical risks, benefits, alternatives and recovery. Patient understands that changing glasses will not significantly improve vision and elects to have surgery. As Dr. Virgen Bonilla is no longer performing cataract surgery, a referral to [facility] was offered. Patient agrees to be referred, so we will send complete exam and information to them and the patient will be contacted. Information on [address] [place] [address] ophthalmology given and reviewed with the patient.

## 2023-04-25 NOTE — PATIENT INSTRUCTIONS
Posterior subcapsular age-related cataract of right eye  Discussed with patient that cataract in the right eye is advanced enough at this time to consider surgery; it would be patient's choice. Discussed options such as surgery or change of glasses RX. Discussed surgical risks, benefits, alternatives and recovery. Patient understands that changing glasses will not significantly improve vision and elects to have surgery. As Dr. Nancy Mejia is no longer performing cataract surgery, a referral to [facility] was offered. Patient agrees to be referred, so we will send complete exam and information to them and the patient will be contacted. Information on [address] [place] [address] ophthalmology given and reviewed with the patient. Diabetes mellitus type 2 without retinopathy ([facility] [address]  Diabetes type II: mild background diabetic retinopathy, no signs of neovascularization noted. No treatment necessary at this time. Patient was instructed to monitor vision for sudden changes and to call if visual changes noted. Discussed ocular and systemic benefits of blood sugar control. Age-related nuclear cataract, left  Discussed early cataracts with patient. Told patient that cataracts are age appropriate and they are not surgical at this time. No treatment recommended at this time.

## 2023-05-30 RX ORDER — SEMAGLUTIDE 1.34 MG/ML
0.5 INJECTION, SOLUTION SUBCUTANEOUS WEEKLY
Qty: 4.5 ML | Refills: 1 | Status: SHIPPED | OUTPATIENT
Start: 2023-05-30

## 2023-05-31 NOTE — TELEPHONE ENCOUNTER
Reason for request:   Product backordered/unavailable    Pharmacy Comments:   Product backordered/unavailable: THE 1.5 ML PEN IS NO LONGER MADE.  PLEASE CHANGE TO QTY 9ML FOR 3 MONTHS

## 2023-07-13 ENCOUNTER — OFFICE VISIT (OUTPATIENT)
Dept: ENDOCRINOLOGY CLINIC | Facility: CLINIC | Age: 48
End: 2023-07-13

## 2023-07-13 VITALS
HEIGHT: 63 IN | BODY MASS INDEX: 43.66 KG/M2 | SYSTOLIC BLOOD PRESSURE: 131 MMHG | WEIGHT: 246.38 LBS | HEART RATE: 81 BPM | DIASTOLIC BLOOD PRESSURE: 75 MMHG

## 2023-07-13 DIAGNOSIS — E11.65 TYPE 2 DIABETES MELLITUS WITH HYPERGLYCEMIA, WITH LONG-TERM CURRENT USE OF INSULIN (HCC): Primary | ICD-10-CM

## 2023-07-13 DIAGNOSIS — Z79.4 TYPE 2 DIABETES MELLITUS WITH HYPERGLYCEMIA, WITH LONG-TERM CURRENT USE OF INSULIN (HCC): Primary | ICD-10-CM

## 2023-07-13 LAB
CARTRIDGE LOT#: ABNORMAL NUMERIC
GLUCOSE BLOOD: 125
HEMOGLOBIN A1C: 7 % (ref 4.3–5.6)
TEST STRIP LOT #: NORMAL NUMERIC

## 2023-07-13 PROCEDURE — 3008F BODY MASS INDEX DOCD: CPT | Performed by: NURSE PRACTITIONER

## 2023-07-13 PROCEDURE — 82947 ASSAY GLUCOSE BLOOD QUANT: CPT | Performed by: NURSE PRACTITIONER

## 2023-07-13 PROCEDURE — 3075F SYST BP GE 130 - 139MM HG: CPT | Performed by: NURSE PRACTITIONER

## 2023-07-13 PROCEDURE — 83036 HEMOGLOBIN GLYCOSYLATED A1C: CPT | Performed by: NURSE PRACTITIONER

## 2023-07-13 PROCEDURE — 99214 OFFICE O/P EST MOD 30 MIN: CPT | Performed by: NURSE PRACTITIONER

## 2023-07-13 PROCEDURE — 3051F HG A1C>EQUAL 7.0%<8.0%: CPT | Performed by: NURSE PRACTITIONER

## 2023-07-13 PROCEDURE — 3078F DIAST BP <80 MM HG: CPT | Performed by: NURSE PRACTITIONER

## 2023-07-13 RX ORDER — SEMAGLUTIDE 1.34 MG/ML
1 INJECTION, SOLUTION SUBCUTANEOUS WEEKLY
Qty: 9 ML | Refills: 1 | Status: SHIPPED | OUTPATIENT
Start: 2023-07-13

## 2023-07-13 NOTE — PATIENT INSTRUCTIONS
A1C: 7.0% today --> increased from 5.8% on 11/29/2022  Blood glucose: 125 in clinic today    Medications:   -continue with Metformin XR  2,000mg daily with breakfast   -continue with U500 120 units Breakfast  120 unit with lunch  125 unit dinner   - increase Ozempic 0.5 -->1mg once weekly   -continue with Farxiga 10mg once daily in AM     Weight:  Wt Readings from Last 6 Encounters:  07/13/23 : 246 lb 6.4 oz (111.8 kg)  11/29/22 : 248 lb (112.5 kg)  11/16/22 : 249 lb 12.8 oz (113.3 kg)  05/10/22 : 254 lb (115.2 kg)  02/15/22 : 240 lb (108.9 kg)  11/03/21 : 256 lb (116.1 kg)    A1C goal:  <7.0%    Blood sugar testing:  Test your blood sugar 1 time daily   Recommended times to test: alternate with before breakfast (fasting) or 2hrs after meals      Blood sugar targets:  Before breakfast:   (preferably < 110)  Before meals OR 2 hours after meals: <150     Call for persistent blood sugars < 75 or > 200

## 2023-08-05 ENCOUNTER — NURSE TRIAGE (OUTPATIENT)
Dept: FAMILY MEDICINE CLINIC | Facility: CLINIC | Age: 48
End: 2023-08-05

## 2023-08-05 NOTE — TELEPHONE ENCOUNTER
Action Requested: Summary for Provider     []  Critical Lab, Recommendations Needed  [] Need Additional Advice  []   FYI    []   Need Orders  [] Need Medications Sent to Pharmacy  []  Other     SUMMARY: Per protocol, patient should be seen in the office within 3 days. Appointment scheduled. Future Appointments   Date Time Provider Salvador Trujilloi   8/8/2023  9:15 AM Roger Dong MD [facility] ADO   9/27/2023  3:15 PM Roger Dong MD [facility] ADO   10/17/2023  4:00 PM JUVENCIO Barrera [facility] Andrea RICKY   11/21/2023  9:30 AM Roger Dong MD [facility] ADO     Reason for call: Back Pain  Onset: 1 Week Ago    Patient reports of moderate low mid back pain that started within last week. She complains of tightness and spasm for the past few days. Sitting for long periods of time such as when driving makes the pain worse and finds herself shifting into different positions to be comfortable. She also finds standing up from sitting painful. She recalls having the same issue few years ago in 2021. Patient has been alternating ice and heat, and taking ibuprofen with barely any relief. Patient verbalized understanding and agreed with plan of care. Appointment scheduled as above.      Reason for Disposition   MODERATE back pain (e.g., interferes with normal activities) and present > 3 days    Protocols used: Back Pain-A-[place]

## 2023-08-08 ENCOUNTER — OFFICE VISIT (OUTPATIENT)
Dept: FAMILY MEDICINE CLINIC | Facility: CLINIC | Age: 48
End: 2023-08-08

## 2023-08-08 VITALS
DIASTOLIC BLOOD PRESSURE: 85 MMHG | HEART RATE: 102 BPM | SYSTOLIC BLOOD PRESSURE: 137 MMHG | WEIGHT: 242.81 LBS | HEIGHT: 63 IN | BODY MASS INDEX: 43.02 KG/M2

## 2023-08-08 DIAGNOSIS — G89.29 CHRONIC THUMB PAIN, LEFT: ICD-10-CM

## 2023-08-08 DIAGNOSIS — M62.838 MUSCLE SPASM: Primary | ICD-10-CM

## 2023-08-08 DIAGNOSIS — M79.645 CHRONIC THUMB PAIN, LEFT: ICD-10-CM

## 2023-08-08 PROCEDURE — 3075F SYST BP GE 130 - 139MM HG: CPT | Performed by: FAMILY MEDICINE

## 2023-08-08 PROCEDURE — 3008F BODY MASS INDEX DOCD: CPT | Performed by: FAMILY MEDICINE

## 2023-08-08 PROCEDURE — 3079F DIAST BP 80-89 MM HG: CPT | Performed by: FAMILY MEDICINE

## 2023-08-08 PROCEDURE — 99214 OFFICE O/P EST MOD 30 MIN: CPT | Performed by: FAMILY MEDICINE

## 2023-08-08 RX ORDER — OFLOXACIN 3 MG/ML
SOLUTION/ DROPS OPHTHALMIC
COMMUNITY
Start: 2023-07-18

## 2023-08-08 RX ORDER — CYCLOBENZAPRINE HCL 5 MG
5 TABLET ORAL 3 TIMES DAILY PRN
Qty: 20 TABLET | Refills: 0 | Status: SHIPPED | OUTPATIENT
Start: 2023-08-08

## 2023-08-08 RX ORDER — NAPROXEN 500 MG/1
500 TABLET ORAL 2 TIMES DAILY WITH MEALS
Qty: 28 TABLET | Refills: 0 | Status: SHIPPED | OUTPATIENT
Start: 2023-08-08

## 2023-08-09 ENCOUNTER — HOSPITAL ENCOUNTER (OUTPATIENT)
Dept: GENERAL RADIOLOGY | Facility: HOSPITAL | Age: 48
Discharge: HOME | End: 2023-08-09
Attending: FAMILY MEDICINE
Payer: COMMERCIAL

## 2023-08-09 DIAGNOSIS — G89.29 CHRONIC THUMB PAIN, LEFT: ICD-10-CM

## 2023-08-09 DIAGNOSIS — M62.838 MUSCLE SPASM: ICD-10-CM

## 2023-08-09 DIAGNOSIS — M79.645 CHRONIC THUMB PAIN, LEFT: ICD-10-CM

## 2023-08-09 PROCEDURE — 73140 X-RAY EXAM OF FINGER(S): CPT | Performed by: FAMILY MEDICINE

## 2023-08-09 PROCEDURE — 72072 X-RAY EXAM THORAC SPINE 3VWS: CPT | Performed by: FAMILY MEDICINE

## 2023-08-14 NOTE — PROGRESS NOTES
Sd Villanueva - The x-rays confirmed arthritis in the areas of pain - please see specialists as recommended if pain is not improving. - Dr. Dallin Quarles

## 2023-09-17 ENCOUNTER — APPOINTMENT (OUTPATIENT)
Dept: MRI IMAGING | Facility: HOSPITAL | Age: 48
End: 2023-09-17
Attending: EMERGENCY MEDICINE
Payer: COMMERCIAL

## 2023-09-17 ENCOUNTER — HOSPITAL ENCOUNTER (EMERGENCY)
Facility: HOSPITAL | Age: 48
Discharge: HOME | End: 2023-09-17
Attending: EMERGENCY MEDICINE
Payer: COMMERCIAL

## 2023-09-17 VITALS
HEART RATE: 85 BPM | SYSTOLIC BLOOD PRESSURE: 105 MMHG | DIASTOLIC BLOOD PRESSURE: 74 MMHG | BODY MASS INDEX: 43.41 KG/M2 | OXYGEN SATURATION: 98 % | RESPIRATION RATE: 16 BRPM | WEIGHT: 245 LBS | HEIGHT: 63 IN | TEMPERATURE: 98 F

## 2023-09-17 DIAGNOSIS — R42 VERTIGO: Primary | ICD-10-CM

## 2023-09-17 DIAGNOSIS — R11.2 NAUSEA AND VOMITING IN ADULT: ICD-10-CM

## 2023-09-17 LAB
ANION GAP SERPL CALC-SCNC: 7 MMOL/L (ref 0–18)
ATRIAL RATE: 96 BPM
BASOPHILS # BLD AUTO: 0.05 X10(3) UL (ref 0–0.2)
BASOPHILS NFR BLD AUTO: 0.4 %
BUN BLD-MCNC: 10 MG/DL (ref 7–18)
BUN/CREAT SERPL: 15.2 (ref 10–20)
CALCIUM BLD-MCNC: 8.9 MG/DL (ref 8.5–10.1)
CHLORIDE SERPL-SCNC: 112 MMOL/L (ref 98–112)
CO2 SERPL-SCNC: 23 MMOL/L (ref 21–32)
CREAT BLD-MCNC: 0.66 MG/DL
DEPRECATED RDW RBC AUTO: 43.2 FL (ref 35.1–46.3)
EGFRCR SERPLBLD CKD-EPI 2021: 109 ML/MIN/1.73M2 (ref 60–?)
EOSINOPHIL # BLD AUTO: 0.19 X10(3) UL (ref 0–0.7)
EOSINOPHIL NFR BLD AUTO: 1.4 %
ERYTHROCYTE [DISTWIDTH] IN BLOOD BY AUTOMATED COUNT: 13 % (ref 11–15)
GLUCOSE BLD-MCNC: 79 MG/DL (ref 70–99)
HCT VFR BLD AUTO: 50 %
HGB BLD-MCNC: 15.8 G/DL
IMM GRANULOCYTES # BLD AUTO: 0.05 X10(3) UL (ref 0–1)
IMM GRANULOCYTES NFR BLD: 0.4 %
LYMPHOCYTES # BLD AUTO: 2.84 X10(3) UL (ref 1–4)
LYMPHOCYTES NFR BLD AUTO: 20.9 %
MCH RBC QN AUTO: 28.8 PG (ref 26–34)
MCHC RBC AUTO-ENTMCNC: 31.6 G/DL (ref 31–37)
MCV RBC AUTO: 91.1 FL
MONOCYTES # BLD AUTO: 0.94 X10(3) UL (ref 0.1–1)
MONOCYTES NFR BLD AUTO: 6.9 %
NEUTROPHILS # BLD AUTO: 9.55 X10 (3) UL (ref 1.5–7.7)
NEUTROPHILS # BLD AUTO: 9.55 X10(3) UL (ref 1.5–7.7)
NEUTROPHILS NFR BLD AUTO: 70 %
OSMOLALITY SERPL CALC.SUM OF ELEC: 292 MOSM/KG (ref 275–295)
P AXIS: 53 DEGREES
P-R INTERVAL: 156 MS
PLATELET # BLD AUTO: 270 10(3)UL (ref 150–450)
POTASSIUM SERPL-SCNC: 3.8 MMOL/L (ref 3.5–5.1)
Q-T INTERVAL: 368 MS
QRS DURATION: 78 MS
QTC CALCULATION (BEZET): 464 MS
R AXIS: 21 DEGREES
RBC # BLD AUTO: 5.49 X10(6)UL
SODIUM SERPL-SCNC: 142 MMOL/L (ref 136–145)
T AXIS: 2 DEGREES
VENTRICULAR RATE: 96 BPM
WBC # BLD AUTO: 13.6 X10(3) UL (ref 4–11)

## 2023-09-17 PROCEDURE — 93010 ELECTROCARDIOGRAM REPORT: CPT

## 2023-09-17 PROCEDURE — 85025 COMPLETE CBC W/AUTO DIFF WBC: CPT

## 2023-09-17 PROCEDURE — 99285 EMERGENCY DEPT VISIT HI MDM: CPT

## 2023-09-17 PROCEDURE — 96374 THER/PROPH/DIAG INJ IV PUSH: CPT

## 2023-09-17 PROCEDURE — 93005 ELECTROCARDIOGRAM TRACING: CPT

## 2023-09-17 PROCEDURE — 85025 COMPLETE CBC W/AUTO DIFF WBC: CPT | Performed by: EMERGENCY MEDICINE

## 2023-09-17 PROCEDURE — 80048 BASIC METABOLIC PNL TOTAL CA: CPT | Performed by: EMERGENCY MEDICINE

## 2023-09-17 PROCEDURE — 70551 MRI BRAIN STEM W/O DYE: CPT | Performed by: EMERGENCY MEDICINE

## 2023-09-17 PROCEDURE — 96361 HYDRATE IV INFUSION ADD-ON: CPT

## 2023-09-17 PROCEDURE — 96375 TX/PRO/DX INJ NEW DRUG ADDON: CPT

## 2023-09-17 PROCEDURE — 80048 BASIC METABOLIC PNL TOTAL CA: CPT

## 2023-09-17 RX ORDER — ONDANSETRON 4 MG/1
4 TABLET, ORALLY DISINTEGRATING ORAL EVERY 4 HOURS PRN
Qty: 10 TABLET | Refills: 0 | Status: SHIPPED | OUTPATIENT
Start: 2023-09-17 | End: 2023-09-24

## 2023-09-17 RX ORDER — MECLIZINE HYDROCHLORIDE 25 MG/1
25 TABLET ORAL ONCE
Status: COMPLETED | OUTPATIENT
Start: 2023-09-17 | End: 2023-09-17

## 2023-09-17 RX ORDER — DIAZEPAM 5 MG/ML
5 INJECTION, SOLUTION INTRAMUSCULAR; INTRAVENOUS ONCE
Status: COMPLETED | OUTPATIENT
Start: 2023-09-17 | End: 2023-09-17

## 2023-09-17 RX ORDER — ONDANSETRON 2 MG/ML
4 INJECTION INTRAMUSCULAR; INTRAVENOUS ONCE
Status: COMPLETED | OUTPATIENT
Start: 2023-09-17 | End: 2023-09-17

## 2023-09-17 RX ORDER — DIAZEPAM 5 MG/1
5 TABLET ORAL 3 TIMES DAILY PRN
Qty: 20 TABLET | Refills: 0 | Status: SHIPPED | OUTPATIENT
Start: 2023-09-17 | End: 2023-09-24

## 2023-09-17 RX ORDER — MECLIZINE HYDROCHLORIDE 25 MG/1
25 TABLET ORAL 3 TIMES DAILY PRN
Qty: 30 TABLET | Refills: 0 | Status: SHIPPED | OUTPATIENT
Start: 2023-09-17

## 2023-09-18 ENCOUNTER — PATIENT OUTREACH (OUTPATIENT)
Dept: CASE MANAGEMENT | Age: 48
End: 2023-09-18

## 2023-10-09 ENCOUNTER — TELEPHONE (OUTPATIENT)
Dept: ORTHOPEDICS CLINIC | Facility: CLINIC | Age: 48
End: 2023-10-09

## 2023-10-09 NOTE — TELEPHONE ENCOUNTER
Imaging was completed for this patient for a lt thumb, but it needs to be reviewed to see if additional imaging is needed. If so, please enter the appropriate RX and let the patient know to come in before their appointment to complete the additional imaging. Thank you!     Future Appointments   Date Time Provider Salvador Duran   10/17/2023  4:00 PM Saint Fret, [facility]N [facility] [place] [facility]   11/21/2023  9:30 AM Isabel Ugarte MD [facility] ADO   11/22/2023  8:15 AM Mariel Sr MD [facility]

## 2023-10-16 ENCOUNTER — TELEPHONE (OUTPATIENT)
Dept: FAMILY MEDICINE CLINIC | Facility: CLINIC | Age: 48
End: 2023-10-16

## 2023-10-16 DIAGNOSIS — Z96.41 TYPE 2 DIABETES MELLITUS WITHOUT COMPLICATION, WITH LONG TERM CURRENT USE OF INSULIN PUMP: Primary | ICD-10-CM

## 2023-10-16 DIAGNOSIS — E11.9 TYPE 2 DIABETES MELLITUS WITHOUT COMPLICATION, WITH LONG TERM CURRENT USE OF INSULIN PUMP: Primary | ICD-10-CM

## 2023-10-17 ENCOUNTER — OFFICE VISIT (OUTPATIENT)
Dept: ENDOCRINOLOGY CLINIC | Facility: CLINIC | Age: 48
End: 2023-10-17

## 2023-10-17 VITALS
BODY MASS INDEX: 43.23 KG/M2 | DIASTOLIC BLOOD PRESSURE: 69 MMHG | SYSTOLIC BLOOD PRESSURE: 123 MMHG | HEART RATE: 90 BPM | WEIGHT: 244 LBS | HEIGHT: 63 IN

## 2023-10-17 DIAGNOSIS — E11.3299 TYPE 2 DIABETES MELLITUS WITH MILD NONPROLIFERATIVE RETINOPATHY WITHOUT MACULAR EDEMA, WITH LONG-TERM CURRENT USE OF INSULIN, UNSPECIFIED LATERALITY (HCC): Primary | ICD-10-CM

## 2023-10-17 DIAGNOSIS — Z79.4 TYPE 2 DIABETES MELLITUS WITH MILD NONPROLIFERATIVE RETINOPATHY WITHOUT MACULAR EDEMA, WITH LONG-TERM CURRENT USE OF INSULIN, UNSPECIFIED LATERALITY (HCC): Primary | ICD-10-CM

## 2023-10-17 LAB
CARTRIDGE LOT#: ABNORMAL NUMERIC
GLUCOSE BLOOD: 124
HEMOGLOBIN A1C: 6.1 % (ref 4.3–5.6)
TEST STRIP LOT #: NORMAL NUMERIC

## 2023-10-17 PROCEDURE — 3044F HG A1C LEVEL LT 7.0%: CPT | Performed by: NURSE PRACTITIONER

## 2023-10-17 PROCEDURE — 99213 OFFICE O/P EST LOW 20 MIN: CPT | Performed by: NURSE PRACTITIONER

## 2023-10-17 PROCEDURE — 3078F DIAST BP <80 MM HG: CPT | Performed by: NURSE PRACTITIONER

## 2023-10-17 PROCEDURE — 3074F SYST BP LT 130 MM HG: CPT | Performed by: NURSE PRACTITIONER

## 2023-10-17 PROCEDURE — 3008F BODY MASS INDEX DOCD: CPT | Performed by: NURSE PRACTITIONER

## 2023-10-17 PROCEDURE — 82947 ASSAY GLUCOSE BLOOD QUANT: CPT | Performed by: NURSE PRACTITIONER

## 2023-10-17 PROCEDURE — 83036 HEMOGLOBIN GLYCOSYLATED A1C: CPT | Performed by: NURSE PRACTITIONER

## 2023-10-17 RX ORDER — PERPHENAZINE 16 MG/1
TABLET, FILM COATED ORAL
Qty: 300 EACH | Refills: 1 | Status: SHIPPED | OUTPATIENT
Start: 2023-10-17

## 2023-10-17 RX ORDER — ACYCLOVIR 400 MG/1
1 TABLET ORAL DAILY
Qty: 1 EACH | Refills: 0 | Status: SHIPPED | OUTPATIENT
Start: 2023-10-17

## 2023-10-17 RX ORDER — ACYCLOVIR 400 MG/1
1 TABLET ORAL
Qty: 9 EACH | Refills: 1 | Status: SHIPPED | OUTPATIENT
Start: 2023-10-17

## 2023-10-17 RX ORDER — BLOOD-GLUCOSE METER
1 EACH MISCELLANEOUS DAILY
Qty: 1 KIT | Refills: 0 | Status: SHIPPED | OUTPATIENT
Start: 2023-10-17

## 2023-10-17 RX ORDER — LANCETS
EACH MISCELLANEOUS
Qty: 300 EACH | Refills: 1 | Status: SHIPPED | OUTPATIENT
Start: 2023-10-17

## 2023-10-17 NOTE — PATIENT INSTRUCTIONS
A1C: 6.1% today --> decrease from 7.0% on 7/13/2023  Blood glucose: 124 in clinic today    Medications:   -continue with Metformin XR  2,000mg daily with breakfast   -continue with U500 120 units Breakfast       120 unit with lunch       125 unit dinner   -continue with Ozempic 1mg once weekly   -continue with Farxiga 10mg once daily       Weight:  Wt Readings from Last 6 Encounters:  10/17/23 : 244 lb (110.7 kg)  09/17/23 : 245 lb (111.1 kg)  08/08/23 : 242 lb 12.8 oz (110.1 kg)  07/13/23 : 246 lb 6.4 oz (111.8 kg)  11/29/22 : 248 lb (112.5 kg)  11/16/22 : 249 lb 12.8 oz (113.3 kg)    A1C goal:  <7.0%    Blood sugar testing:  Start using Dexcom G7 continuous glucometer     Blood sugar targets:  Before breakfast:  (preferably < 110)  Before meals OR 2 hours after meals: <180 (preferably <150)     Call for persistent blood sugars < 75 or > 200

## 2023-10-17 NOTE — PROGRESS NOTES
Patient presents to office today for [facility] application. RN discussed Dexcom CGM with patient, and download Dexcom G7 and Clarity rosana. Once done, discussed application with patient in detail and applied sensor. Explained the need to keep and remove old sensor after 10 days, every 10 days. Patient added Sensor codes into rosana for activation.   Clarity code available in patient's chart

## 2023-10-18 ENCOUNTER — PATIENT MESSAGE (OUTPATIENT)
Dept: FAMILY MEDICINE CLINIC | Facility: CLINIC | Age: 48
End: 2023-10-18

## 2023-10-18 DIAGNOSIS — Z12.31 ENCOUNTER FOR SCREENING MAMMOGRAM FOR MALIGNANT NEOPLASM OF BREAST: Primary | ICD-10-CM

## 2023-10-20 ENCOUNTER — TELEPHONE (OUTPATIENT)
Dept: ENDOCRINOLOGY CLINIC | Facility: CLINIC | Age: 48
End: 2023-10-20

## 2023-10-20 NOTE — TELEPHONE ENCOUNTER
Prior Authorization      Continuous Blood Gluc Sensor (DEXCOM G7 SENSOR) Does not apply Misc, 1 each Every 10 days. , Disp: 9 each, Rfl: 1    Continuous Blood Gluc  ([address]) Does not apply Device, 1 each daily. , Disp: 1 each, Rfl: 0    [id number]  [id number]

## 2023-10-20 NOTE — TELEPHONE ENCOUNTER
Medication PA Requested: Continuous Blood Gluc Sensor (DEXCOM G7 SENSOR)                                                          CoverMyMeds Used:  Key:  Quantity: 9 each  Day Supply: 90 days   Si each Every 10 days   DX Code:  E11.65, Z79.4                                    CPT code (if applicable):   Case Number/Pending Ref#:     Medication PA Requested:  Continuous Blood Gluc  (DEXCOM G7 )                                                          CoverMyMeds Used:  Key:  Quantity: 1 each  Day Supply: 999  Si each daily.    DX Code: E11.65, Z79.4                                      CPT code (if applicable):   Case Number/Pending Ref#:

## 2023-10-23 ENCOUNTER — PATIENT MESSAGE (OUTPATIENT)
Dept: ENDOCRINOLOGY CLINIC | Facility: CLINIC | Age: 48
End: 2023-10-23

## 2023-10-23 RX ORDER — INSULIN HUMAN 500 [IU]/ML
INJECTION, SOLUTION SUBCUTANEOUS
Qty: 24 ML | Refills: 2 | Status: SHIPPED | OUTPATIENT
Start: 2023-10-23

## 2023-10-23 NOTE — TELEPHONE ENCOUNTER
Medication PA Requested: Continuous Blood Gluc Sensor (DEXCOM G7 SENSOR)                                                          CoverMyMeds Used:  Key:  Quantity: 9 each  Day Supply: 90 days   Si each Every 10 days   DX Code:  E11.65, Z79.4                                    CPT code (if applicable):   Case Number/Pending Ref#:     Medication PA Requested:  Continuous Blood Gluc  (DEXCOM G7 )                                                          CoverMyMeds Used:  Key:  Quantity: 1 each  Day Supply: 999  Si each daily.    DX Code: E11.65, Z79.4                                      CPT code (if applicable):   Case Number/Pending Ref#:     ePA x2 submitted with LOV, A1c, and HemoCue 10/17/23  Awaiting determination

## 2023-10-24 RX ORDER — BLOOD SUGAR DIAGNOSTIC
STRIP MISCELLANEOUS
Qty: 300 EACH | Refills: 0 | Status: SHIPPED | OUTPATIENT
Start: 2023-10-24

## 2023-10-24 RX ORDER — LANCETS 30 GAUGE
EACH MISCELLANEOUS
Qty: 1 KIT | Refills: 0 | Status: SHIPPED | OUTPATIENT
Start: 2023-10-24

## 2023-10-24 RX ORDER — LANCETS 33 GAUGE
3 EACH MISCELLANEOUS DAILY
Qty: 300 EACH | Refills: 0 | Status: SHIPPED | OUTPATIENT
Start: 2023-10-24

## 2023-10-26 NOTE — TELEPHONE ENCOUNTER
Received fax from [address] stating the [address] has been approved up to 1  per 365 days and valid from 10/26/23 and ends 10/26/24.     GHADA#[id number]    [facility]#[id number]  [facility] sent

## 2023-10-26 NOTE — TELEPHONE ENCOUNTER
LOV: 10/17/2023    RTC: 6 Months    FU: 04/23/2024    Last Refill: 03/22/2023    3 Month Supply Pending      Please approve if applicable

## 2023-10-26 NOTE — TELEPHONE ENCOUNTER
Called [employer] at [phone], spoke with Lizzy Cho, states PA is still pending. It can take up to 72 hours.   Call ref# [id number]

## 2023-11-07 ENCOUNTER — IMMUNIZATION (OUTPATIENT)
Dept: LAB | Age: 48
End: 2023-11-07
Attending: EMERGENCY MEDICINE
Payer: COMMERCIAL

## 2023-11-07 DIAGNOSIS — Z23 NEED FOR VACCINATION: Primary | ICD-10-CM

## 2023-11-07 PROCEDURE — 90480 ADMN SARSCOV2 VAC 1/ONLY CMP: CPT

## 2023-11-18 ENCOUNTER — HOSPITAL ENCOUNTER (OUTPATIENT)
Dept: MAMMOGRAPHY | Age: 48
Discharge: HOME | End: 2023-11-18
Attending: FAMILY MEDICINE
Payer: COMMERCIAL

## 2023-11-18 DIAGNOSIS — Z12.31 ENCOUNTER FOR SCREENING MAMMOGRAM FOR MALIGNANT NEOPLASM OF BREAST: ICD-10-CM

## 2023-11-18 PROCEDURE — 77067 SCR MAMMO BI INCL CAD: CPT | Performed by: FAMILY MEDICINE

## 2023-11-18 PROCEDURE — 77063 BREAST TOMOSYNTHESIS BI: CPT | Performed by: FAMILY MEDICINE

## 2023-11-21 ENCOUNTER — OFFICE VISIT (OUTPATIENT)
Dept: FAMILY MEDICINE CLINIC | Facility: CLINIC | Age: 48
End: 2023-11-21
Payer: COMMERCIAL

## 2023-11-21 VITALS
DIASTOLIC BLOOD PRESSURE: 85 MMHG | WEIGHT: 247.19 LBS | BODY MASS INDEX: 44 KG/M2 | SYSTOLIC BLOOD PRESSURE: 126 MMHG | HEART RATE: 84 BPM

## 2023-11-21 DIAGNOSIS — F41.9 ANXIETY: ICD-10-CM

## 2023-11-21 DIAGNOSIS — E78.5 DYSLIPIDEMIA: ICD-10-CM

## 2023-11-21 DIAGNOSIS — Z96.41 TYPE 2 DIABETES MELLITUS WITHOUT COMPLICATION, WITH LONG TERM CURRENT USE OF INSULIN PUMP: ICD-10-CM

## 2023-11-21 DIAGNOSIS — E28.2 PCOS (POLYCYSTIC OVARIAN SYNDROME): ICD-10-CM

## 2023-11-21 DIAGNOSIS — E11.9 TYPE 2 DIABETES MELLITUS WITHOUT COMPLICATION, WITH LONG TERM CURRENT USE OF INSULIN PUMP: ICD-10-CM

## 2023-11-21 DIAGNOSIS — Z00.00 ROUTINE MEDICAL EXAM: Primary | ICD-10-CM

## 2023-11-21 PROCEDURE — 99396 PREV VISIT EST AGE 40-64: CPT | Performed by: FAMILY MEDICINE

## 2023-11-21 PROCEDURE — 3079F DIAST BP 80-89 MM HG: CPT | Performed by: FAMILY MEDICINE

## 2023-11-21 PROCEDURE — 3074F SYST BP LT 130 MM HG: CPT | Performed by: FAMILY MEDICINE

## 2023-11-22 ENCOUNTER — OFFICE VISIT (OUTPATIENT)
Dept: ORTHOPEDICS CLINIC | Facility: CLINIC | Age: 48
End: 2023-11-22
Payer: COMMERCIAL

## 2023-11-22 VITALS — WEIGHT: 247 LBS | BODY MASS INDEX: 43.77 KG/M2 | HEIGHT: 63 IN

## 2023-11-22 DIAGNOSIS — M18.10 ARTHRITIS OF CARPOMETACARPAL (CMC) JOINT OF THUMB: ICD-10-CM

## 2023-11-22 DIAGNOSIS — M65.4 TENOSYNOVITIS OF RADIAL STYLOID: Primary | ICD-10-CM

## 2023-11-22 PROCEDURE — 20550 NJX 1 TENDON SHEATH/LIGAMENT: CPT | Performed by: ORTHOPAEDIC SURGERY

## 2023-11-22 PROCEDURE — 99204 OFFICE O/P NEW MOD 45 MIN: CPT | Performed by: ORTHOPAEDIC SURGERY

## 2023-11-22 PROCEDURE — 3008F BODY MASS INDEX DOCD: CPT | Performed by: ORTHOPAEDIC SURGERY

## 2023-11-22 PROCEDURE — 20600 DRAIN/INJ JOINT/BURSA W/O US: CPT | Performed by: ORTHOPAEDIC SURGERY

## 2023-11-22 RX ORDER — BETAMETHASONE SODIUM PHOSPHATE AND BETAMETHASONE ACETATE 3; 3 MG/ML; MG/ML
6 INJECTION, SUSPENSION INTRA-ARTICULAR; INTRALESIONAL; INTRAMUSCULAR; SOFT TISSUE ONCE
Status: COMPLETED | OUTPATIENT
Start: 2023-11-22 | End: 2023-11-22

## 2023-11-22 RX ADMIN — BETAMETHASONE SODIUM PHOSPHATE AND BETAMETHASONE ACETATE 6 MG: 3; 3 INJECTION, SUSPENSION INTRA-ARTICULAR; INTRALESIONAL; INTRAMUSCULAR; SOFT TISSUE at 08:45:00

## 2023-12-04 RX ORDER — ACYCLOVIR 400 MG/1
1 TABLET ORAL DAILY
Qty: 1 EACH | Refills: 0 | Status: SHIPPED | OUTPATIENT
Start: 2023-12-04

## 2023-12-16 ENCOUNTER — LAB ENCOUNTER (OUTPATIENT)
Dept: LAB | Facility: HOSPITAL | Age: 48
End: 2023-12-16
Attending: FAMILY MEDICINE
Payer: COMMERCIAL

## 2023-12-16 DIAGNOSIS — Z96.41 TYPE 2 DIABETES MELLITUS WITHOUT COMPLICATION, WITH LONG TERM CURRENT USE OF INSULIN PUMP  (HCC): ICD-10-CM

## 2023-12-16 DIAGNOSIS — E11.9 TYPE 2 DIABETES MELLITUS WITHOUT COMPLICATION, WITH LONG TERM CURRENT USE OF INSULIN PUMP  (HCC): ICD-10-CM

## 2023-12-16 LAB
ALBUMIN SERPL-MCNC: 4.2 G/DL (ref 3.2–4.8)
ALBUMIN/GLOB SERPL: 1.6 {RATIO} (ref 1–2)
ALP LIVER SERPL-CCNC: 95 U/L
ALT SERPL-CCNC: 23 U/L
ANION GAP SERPL CALC-SCNC: 5 MMOL/L (ref 0–18)
AST SERPL-CCNC: 20 U/L (ref ?–34)
BASOPHILS # BLD AUTO: 0.04 X10(3) UL (ref 0–0.2)
BASOPHILS NFR BLD AUTO: 0.4 %
BILIRUB SERPL-MCNC: 1.2 MG/DL (ref 0.3–1.2)
BUN BLD-MCNC: 8 MG/DL (ref 9–23)
BUN/CREAT SERPL: 12.5 (ref 10–20)
CALCIUM BLD-MCNC: 9.4 MG/DL (ref 8.7–10.4)
CHLORIDE SERPL-SCNC: 108 MMOL/L (ref 98–112)
CHOLEST SERPL-MCNC: 134 MG/DL (ref ?–200)
CO2 SERPL-SCNC: 26 MMOL/L (ref 21–32)
CREAT BLD-MCNC: 0.64 MG/DL
CREAT UR-SCNC: 134.2 MG/DL
DEPRECATED RDW RBC AUTO: 42.2 FL (ref 35.1–46.3)
EGFRCR SERPLBLD CKD-EPI 2021: 110 ML/MIN/1.73M2 (ref 60–?)
EOSINOPHIL # BLD AUTO: 0.58 X10(3) UL (ref 0–0.7)
EOSINOPHIL NFR BLD AUTO: 6.1 %
ERYTHROCYTE [DISTWIDTH] IN BLOOD BY AUTOMATED COUNT: 12.9 % (ref 11–15)
FASTING PATIENT LIPID ANSWER: YES
FASTING STATUS PATIENT QL REPORTED: YES
GLOBULIN PLAS-MCNC: 2.7 G/DL (ref 2.8–4.4)
GLUCOSE BLD-MCNC: 114 MG/DL (ref 70–99)
HCT VFR BLD AUTO: 48.7 %
HDLC SERPL-MCNC: 51 MG/DL (ref 40–59)
HGB BLD-MCNC: 15.5 G/DL
IMM GRANULOCYTES # BLD AUTO: 0.03 X10(3) UL (ref 0–1)
IMM GRANULOCYTES NFR BLD: 0.3 %
LDLC SERPL CALC-MCNC: 64 MG/DL (ref ?–100)
LYMPHOCYTES # BLD AUTO: 2.55 X10(3) UL (ref 1–4)
LYMPHOCYTES NFR BLD AUTO: 26.8 %
MCH RBC QN AUTO: 28.4 PG (ref 26–34)
MCHC RBC AUTO-ENTMCNC: 31.8 G/DL (ref 31–37)
MCV RBC AUTO: 89.4 FL
MICROALBUMIN UR-MCNC: 0.4 MG/DL
MICROALBUMIN/CREAT 24H UR-RTO: 3 UG/MG (ref ?–30)
MONOCYTES # BLD AUTO: 0.65 X10(3) UL (ref 0.1–1)
MONOCYTES NFR BLD AUTO: 6.8 %
NEUTROPHILS # BLD AUTO: 5.65 X10 (3) UL (ref 1.5–7.7)
NEUTROPHILS # BLD AUTO: 5.65 X10(3) UL (ref 1.5–7.7)
NEUTROPHILS NFR BLD AUTO: 59.6 %
NONHDLC SERPL-MCNC: 83 MG/DL (ref ?–130)
OSMOLALITY SERPL CALC.SUM OF ELEC: 287 MOSM/KG (ref 275–295)
PLATELET # BLD AUTO: 257 10(3)UL (ref 150–450)
POTASSIUM SERPL-SCNC: 4.1 MMOL/L (ref 3.5–5.1)
PROT SERPL-MCNC: 6.9 G/DL (ref 5.7–8.2)
RBC # BLD AUTO: 5.45 X10(6)UL
SODIUM SERPL-SCNC: 139 MMOL/L (ref 136–145)
TRIGL SERPL-MCNC: 106 MG/DL (ref 30–149)
TSI SER-ACNC: 1.19 MIU/ML (ref 0.55–4.78)
VIT B12 SERPL-MCNC: 438 PG/ML (ref 211–911)
VIT D+METAB SERPL-MCNC: 35.2 NG/ML (ref 30–100)
VLDLC SERPL CALC-MCNC: 16 MG/DL (ref 0–30)
WBC # BLD AUTO: 9.5 X10(3) UL (ref 4–11)

## 2023-12-16 PROCEDURE — 82570 ASSAY OF URINE CREATININE: CPT

## 2023-12-16 PROCEDURE — 80061 LIPID PANEL: CPT

## 2023-12-16 PROCEDURE — 82043 UR ALBUMIN QUANTITATIVE: CPT

## 2023-12-16 PROCEDURE — 82607 VITAMIN B-12: CPT

## 2023-12-16 PROCEDURE — 85025 COMPLETE CBC W/AUTO DIFF WBC: CPT

## 2023-12-16 PROCEDURE — 82306 VITAMIN D 25 HYDROXY: CPT

## 2023-12-16 PROCEDURE — 36415 COLL VENOUS BLD VENIPUNCTURE: CPT

## 2023-12-16 PROCEDURE — 84443 ASSAY THYROID STIM HORMONE: CPT

## 2023-12-16 PROCEDURE — 80053 COMPREHEN METABOLIC PANEL: CPT

## 2023-12-27 ENCOUNTER — PATIENT MESSAGE (OUTPATIENT)
Dept: ENDOCRINOLOGY CLINIC | Facility: CLINIC | Age: 48
End: 2023-12-27

## 2023-12-27 RX ORDER — INSULIN HUMAN 500 [IU]/ML
INJECTION, SOLUTION SUBCUTANEOUS
Qty: 24 ML | Refills: 2 | Status: SHIPPED | OUTPATIENT
Start: 2023-12-27

## 2023-12-27 NOTE — TELEPHONE ENCOUNTER
From: Mary Gregory  To: Melba Barber  Sent: 12/27/2023 12:46 PM CST  Subject: Pen needles needed    Hello - can you order these for me to my pharmacy? [facility] in [place]. Thank you!

## 2024-01-01 NOTE — TELEPHONE ENCOUNTER
Please review; protocol failed/ No protocol   Last office visit = 11/21/23   Last refill = 8/8/23        Requested Prescriptions   Pending Prescriptions Disp Refills    CYCLOBENZAPRINE 5 MG Oral Tab [Pharmacy Med Name: CYCLOBENZAPRINE 5 MG TABLET] 20 tablet 0     Sig: TAKE 1 TABLET BY MOUTH THREE TIMES A DAY AS NEEDED FOR MUSCLE SPASMS       There is no refill protocol information for this order        Future Appointments         Provider Department Appt Notes    In 1 week Corinna Benítez MD [facility] - OB/[facility]N Annual visit, new patient    In 2 weeks Cheikh Velasco MD [facility] [address], [place] LEFT THUMB PAIN    In 3 weeks Marlene Zambrano MD [facility] skin lesions    In 3 months Sorin Persaud APRN [facility] Follow up          Recent Outpatient Visits              1 month ago Tenosynovitis of radial styloid    [facility] [address], [place] Cheikh Velasco MD    Office Visit    1 month ago Routine medical exam    [facility]    Office Visit    2 months ago Type 2 diabetes mellitus with mild nonproliferative retinopathy without macular edema, with long-term current use of insulin, unspecified laterality ([facility])    [facility] Sorin Persaud APRN    Office Visit    4 months ago Muscle spasm    [facility]    Office Visit    5 months ago Type 2 diabetes mellitus with hyperglycemia, with long-term current use of insulin ([facility])    [facility] Sorin Persaud APRN    Office Visit

## 2024-01-02 RX ORDER — CYCLOBENZAPRINE HCL 5 MG
5 TABLET ORAL 3 TIMES DAILY PRN
Qty: 20 TABLET | Refills: 0 | Status: SHIPPED | OUTPATIENT
Start: 2024-01-02

## 2024-01-10 ENCOUNTER — TELEPHONE (OUTPATIENT)
Dept: OBGYN CLINIC | Facility: CLINIC | Age: 49
End: 2024-01-10

## 2024-01-10 ENCOUNTER — OFFICE VISIT (OUTPATIENT)
Dept: OBGYN CLINIC | Facility: CLINIC | Age: 49
End: 2024-01-10
Payer: COMMERCIAL

## 2024-01-10 VITALS
HEART RATE: 111 BPM | WEIGHT: 246 LBS | BODY MASS INDEX: 44 KG/M2 | SYSTOLIC BLOOD PRESSURE: 116 MMHG | DIASTOLIC BLOOD PRESSURE: 78 MMHG

## 2024-01-10 DIAGNOSIS — Z01.419 ENCOUNTER FOR GYNECOLOGICAL EXAMINATION WITHOUT ABNORMAL FINDING: Primary | ICD-10-CM

## 2024-01-10 DIAGNOSIS — Z97.5 IUD CONTRACEPTION: Primary | ICD-10-CM

## 2024-01-10 PROCEDURE — 3078F DIAST BP <80 MM HG: CPT | Performed by: OBSTETRICS & GYNECOLOGY

## 2024-01-10 PROCEDURE — 99386 PREV VISIT NEW AGE 40-64: CPT | Performed by: OBSTETRICS & GYNECOLOGY

## 2024-01-10 PROCEDURE — 3074F SYST BP LT 130 MM HG: CPT | Performed by: OBSTETRICS & GYNECOLOGY

## 2024-01-10 NOTE — PROGRESS NOTES
Kay Tracy is a 48 year old female  No LMP recorded. (Menstrual status: IUD - Intrauterine Device).    Chief Complaint   Patient presents with    Gyn Exam     New pt annual    .     She is a former pt of Dr Therese Shell.  She has had mirena in place for 5 years and last month she had some spotting for 3 days.  It was placed for  heavy menses- I explained that since being used for menorrhagia treatment that it needs to be changed in 5 years.  Pt will reschedule for this.    Type 2 DM- sees Dr Perera.  OBSTETRICS HISTORY:  OB History    Para Term  AB Living   2 2 2 0 0 2   SAB IAB Ectopic Multiple Live Births   0 0 0 0 2       GYNE HISTORY:   Pap Date: 21  Pap Result Notes: Neg Pap/HPV // Mammo 23 Tr Neg      MEDICAL HISTORY:  Past Medical History:   Diagnosis Date    Allergic rhinitis     Asthma     allergy induced    Carpal tunnel syndrome     Diabetes (HCC)     diabetes for     Eczema     2-3 years ago, only on right fingers    Essential hypertension     Normal BP readings at home    GDM (gestational diabetes mellitus)     High blood pressure     High cholesterol 2014    HORMONE DISORDER     PCOS    Infertility female     Obesity     Other and unspecified hyperlipidemia 2014    PCOS (polycystic ovarian syndrome)     Seasonal allergies     Shoulder tendonitis     Type II or unspecified type diabetes mellitus without mention of complication, uncontrolled 2014     Past Surgical History:   Procedure Laterality Date    ADENOIDECTOMY  1980    APPENDECTOMY  2015      ,    CARPAL TUNNEL RELEASE Right     CATARACT EXTRACTION W/  INTRAOCULAR LENS IMPLANT Right 2023    Dr. Ny @ [facility]    COLONOSCOPY N/A 02/15/2022    Procedure: COLONOSCOPY;  Surgeon: Bobby Ann MD;  Location: [facility] ENDOSCOPY    D & C  ?    LAPAROSCOPIC APPENDECTOMY  2015    OTHER      carpal tunnel release    OTHER      d&c    OTHER SURGICAL  HISTORY      IVF    REDUCTION LEFT  1998    REDUCTION OF LARGE BREAST  1998    REDUCTION RIGHT  1998    TONSILLECTOMY  1980         SOCIAL HISTORY:  Social History     Socioeconomic History    Marital status:     Number of children: 1   Occupational History    Occupation: speech therapist   Tobacco Use    Smoking status: Never    Smokeless tobacco: Never   Vaping Use    Vaping Use: Never used   Substance and Sexual Activity    Alcohol use: Never    Drug use: Never    Sexual activity: Yes   Other Topics Concern    Caffeine Concern Yes     Comment: soda-1 cup/day    Reaction to local anesthetic No    Pt has a pacemaker No    Pt has a defibrillator No        FAMILY HISTORY:  Family History   Problem Relation Age of Onset    Hypertension Father     Obesity Father     Cancer Paternal Grandfather         Esophageal cancer    Diabetes Paternal Grandmother     Stroke Paternal Grandmother     Cancer Son         Medulloblastoma    Glaucoma Neg     Macular degeneration Neg        MEDICATIONS:    Current Outpatient Medications:     cyclobenzaprine 5 MG Oral Tab, Take 1 tablet (5 mg total) by mouth 3 (three) times daily as needed for Muscle spasms., Disp: 20 tablet, Rfl: 0    insulin regular human, conc, (HUMULIN R U-500 KWIKPEN) 500 UNIT/ML Subcutaneous Solution Pen-injector, Inject 0.24 mL (120 Units total) into the skin daily with breakfast AND 0.24 mL (120 Units total) daily with lunch AND 0.25 mL (125 Units total) daily with dinner., Disp: 24 mL, Rfl: 2    Insulin Pen Needle 32G X 4 MM Does not apply Misc, Inject 3x daily, Disp: 300 each, Rfl: 0    dapagliflozin (FARXIGA) 10 MG Oral Tab, Take 1 tablet (10 mg total) by mouth daily., Disp: 90 tablet, Rfl: 1    meclizine 25 MG Oral Tab, Take 1 tablet (25 mg total) by mouth 3 (three) times daily as needed for Dizziness., Disp: 30 tablet, Rfl: 0    semaglutide (OZEMPIC, 1 MG/DOSE,) 4 MG/3ML Subcutaneous Solution Pen-injector, Inject 1 mg into the skin once a week., Disp:  9 mL, Rfl: 1    atorvastatin 40 MG Oral Tab, TAKE 1 TABLET BY MOUTH EVERY DAY AT NIGHT, Disp: 90 tablet, Rfl: 3    metFORMIN  MG Oral Tablet 24 Hr, TAKE FOUR TABLETS BY MOUTH DAILY WITH BREAKFAST., Disp: 360 tablet, Rfl: 3    lisinopril 5 MG Oral Tab, Take 1 tablet (5 mg total) by mouth daily., Disp: 90 tablet, Rfl: 3    TRAZODONE 50 MG Oral Tab, TAKE 1 TABLET BY MOUTH NIGHTLY AS NEEDED FOR SLEEP, Disp: 90 tablet, Rfl: 1    levonorgestrel 20 MCG/24HR Intrauterine IUD, Mirena 20 mcg/24 hr (5 years) intrauterine device  Take by intrauterine route., Disp: , Rfl:     Continuous Blood Gluc  (DEXCOM G7 ) Does not apply Device, 1 each daily., Disp: 1 each, Rfl: 0    OneTouch Delica Lancets 33G Does not apply Misc, 3 each daily. Use to check blood sugars three times daily, Disp: 300 each, Rfl: 0    Glucose Blood (ONETOUCH ULTRA) In Vitro Strip, Test blood sugars 3x daily, Disp: 300 each, Rfl: 0    Glucose Blood (CONTOUR NEXT TEST) In Vitro Strip, Test 3x daily, Disp: 300 each, Rfl: 1    Microlet Lancets Does not apply Misc, Test 3x daily, Disp: 300 each, Rfl: 1    ofloxacin 0.3 % Ophthalmic Solution, INSTILL 1 DROP IN RIGHT EYE FOUR TIMES A DAY START EYE DROPS AFTER SURGERY, USE FOR ONE WEEK (Patient not taking: Reported on 10/17/2023), Disp: , Rfl:     naproxen 500 MG Oral Tab, Take 1 tablet (500 mg total) by mouth 2 (two) times daily with meals., Disp: 28 tablet, Rfl: 0    Insulin Pen Needle (CAREFINE PEN NEEDLES) 32G X 4 MM Does not apply Misc, Inject three times a day., Disp: 300 each, Rfl: 0    Glucose Blood In Vitro Strip, Check sugars four times a day., Disp: 300 each, Rfl: 0    Insulin Syringe-Needle U-100 31G X 5/16\" 0.5 ML Does not apply Misc, Inject three times daily, Disp: 100 each, Rfl: 0    ALLERGIES:    Allergies   Allergen Reactions    Cat Dander [Dander]     Iodine (Topical)     Seasonal     Nickel RASH    No Known Allergies ITCHING       Blood pressure 116/78, pulse 111, weight 246 lb  (111.6 kg), not currently breastfeeding.    Review of Systems:  Constitutional:  Denies fatigue, night sweats, hot flashes  Eyes:  denies blurred or double vision  Cardiovascular:  denies chest pain or palpitations  Respiratory:  denies shortness of breath  Gastrointestinal:  denies heartburn, abdominal pain, diarrhea or constipation  Genitourinary:  denies dysuria, incontinence, abnormal vaginal discharge, vaginal itching  Musculoskeletal:  denies back pain.  Skin/Breast:  Denies any breast pain, lumps, or discharge.   Neurological:  denies headaches, extremity weakness or numbness.  Psychiatric: denies depression or anxiety.  Endocrine:   denies excessive thirst or urination.  Heme/Lymph:  denies history of anemia, easy bruising or bleeding.    Depression Screening (PHQ-2/PHQ-9): Over the LAST 2 WEEKS                         PHYSICAL EXAM:   Constitutional: well developed, well nourished  Head/Face: normocephalic  Neck/Thyroid: thyroid symmetric, no thyromegaly, no nodules, no adenopathy  Lymphatic:no abnormal supraclavicular or axillary adenopathy is noted  Breast: normal without palpable masses, tenderness, asymmetry, nipple discharge, nipple retraction or skin changes  Respiratory:  lungs clear to auscultation bilaterally  Cardiovascular: regular rate and rhythm, no significant murmur  Abdomen:  soft, nontender, nondistended, no masses  Skin/Hair: no unusual rashes or bruises  Extremities: no edema, no cyanosis  Psychiatric:  Oriented to time, place, person and situation. Appropriate mood and affect    Pelvic Exam:  External Genitalia: normal appearance, hair distribution, and no lesions  Urethral Meatus:  normal in size, location, without lesions and prolapse  Bladder:  No fullness, masses or tenderness  Vagina:  Normal appearance without lesions, no abnormal discharge  Cervix:  Normal without tenderness on motion, IUD strings present  Uterus: normal in size, contour, position, mobility, without  tenderness  Adnexa: normal without masses or tenderness, exam limited by body habitus  Perineum: normal  Anus: no hemorroids     Assessment & Plan:    Encounter Diagnosis   Name Primary?    Encounter for gynecological examination without abnormal finding Yes     ASCCP guidelines discussed,cotest done [id number] next year,rtc 1 year for annual exam   SBE encouraged  No orders of the defined types were placed in this encounter.      Requested Prescriptions      No prescriptions requested or ordered in this encounter       None

## 2024-01-18 ENCOUNTER — PATIENT MESSAGE (OUTPATIENT)
Dept: ENDOCRINOLOGY CLINIC | Facility: CLINIC | Age: 49
End: 2024-01-18

## 2024-01-18 NOTE — TELEPHONE ENCOUNTER
Called and spoke with pt. Reviewed Dexcom reports which demonstrate low blood sugars occurring in the evening and over night; most notably over night. Pt states with the increase in Ozempic to 1mg, she has been eating healthier and has noticed a decreased in her appetite. Advised the following:    Humulin U500   B: 120 units   L: 120 --> 115 units   D: 125 --> 120 units  Metformin XR 2000mg daily with breakfast  Farxiga 10mg daily  Ozempic 1mg/weekly

## 2024-01-25 NOTE — ADDENDUM NOTE
Addended byYareli Shanks on: 6/5/2019 04:49 PM     Modules accepted: Orders Noted nurse request for skin blisters/dermatology condition on patient. MD talked with dermatologist for treatment plan. Recommend keeping skin clean and dry. Discussed with primary nurse, she noted that dermatologist to see tomorrow.

## 2024-01-27 ENCOUNTER — OFFICE VISIT (OUTPATIENT)
Dept: DERMATOLOGY CLINIC | Facility: CLINIC | Age: 49
End: 2024-01-27
Payer: COMMERCIAL

## 2024-01-27 ENCOUNTER — TELEPHONE (OUTPATIENT)
Dept: DERMATOLOGY CLINIC | Facility: CLINIC | Age: 49
End: 2024-01-27

## 2024-01-27 DIAGNOSIS — D23.9 BENIGN NEOPLASM OF SKIN, UNSPECIFIED LOCATION: ICD-10-CM

## 2024-01-27 DIAGNOSIS — L72.0 EPIDERMAL CYST: ICD-10-CM

## 2024-01-27 DIAGNOSIS — D22.9 MULTIPLE NEVI: ICD-10-CM

## 2024-01-27 DIAGNOSIS — L30.1 DYSHIDROTIC ECZEMA: ICD-10-CM

## 2024-01-27 DIAGNOSIS — D23.9 BLUE NEVUS: ICD-10-CM

## 2024-01-27 DIAGNOSIS — L82.1 SK (SEBORRHEIC KERATOSIS): Primary | ICD-10-CM

## 2024-01-27 PROCEDURE — 99214 OFFICE O/P EST MOD 30 MIN: CPT | Performed by: DERMATOLOGY

## 2024-01-28 NOTE — PROGRESS NOTES
Kay Tracy is a 48 year old female.  HPI:     CC:    Chief Complaint   Patient presents with    Upper Body Exam     LOV 23- Pt presents for an Upper Body Skin Exam. Pt has several lesions- following up on growth on the Left scalp (near hairline) and abdomen. Pt denies changes to lesions. Pt has small, itchy bumps that appeared around the Right pointer finger x 2 months. Pt has been using hydrocortisone and regular lotion without improvement to appearance.   Pt denies a personal or family Hx of skin ca          Allergies:  Cat dander [dander], Iodine (topical), Seasonal, Nickel, and No known allergies    HISTORY:    Past Medical History:   Diagnosis Date    Allergic rhinitis     Asthma     allergy induced    Carpal tunnel syndrome     Diabetes (HCC)     diabetes for 2011    Eczema     2-3 years ago, only on right fingers    Essential hypertension     Normal BP readings at home    GDM (gestational diabetes mellitus)     High blood pressure     High cholesterol 2014    HORMONE DISORDER     PCOS    Infertility female     Obesity     Other and unspecified hyperlipidemia 2014    PCOS (polycystic ovarian syndrome)     Seasonal allergies     Shoulder tendonitis     Type II or unspecified type diabetes mellitus without mention of complication, uncontrolled 2014      Past Surgical History:   Procedure Laterality Date    ADENOIDECTOMY        ,    CARPAL TUNNEL RELEASE Right     CATARACT EXTRACTION W/  INTRAOCULAR LENS IMPLANT Right 2023    Dr. Ny @ [facility]    COLONOSCOPY N/A 02/15/2022    Procedure: COLONOSCOPY;  Surgeon: Bobby Ann MD;  Location: [facility] ENDOSCOPY    D & C  ?    LAPAROSCOPIC APPENDECTOMY  2015    OTHER SURGICAL HISTORY      IVF    REDUCTION OF LARGE BREAST  1998    TONSILLECTOMY        Family History   Problem Relation Age of Onset    Hypertension Father     Obesity Father     Cancer Paternal Grandfather         Esophageal cancer     Diabetes Paternal Grandmother     Stroke Paternal Grandmother     Cancer Son         Medulloblastoma    Glaucoma Neg     Macular degeneration Neg       Social History     Socioeconomic History    Marital status:     Number of children: 1   Occupational History    Occupation: speech therapist   Tobacco Use    Smoking status: Never    Smokeless tobacco: Never   Vaping Use    Vaping Use: Never used   Substance and Sexual Activity    Alcohol use: Never    Drug use: Never    Sexual activity: Yes   Other Topics Concern    Caffeine Concern Yes     Comment: soda-1 cup/day    Reaction to local anesthetic No    Pt has a pacemaker No    Pt has a defibrillator No        Current Outpatient Medications   Medication Sig Dispense Refill    fluocinonide 0.05 % External Cream Use twice daily on affected areas, itchy bumps on hands 60 g 3    cyclobenzaprine 5 MG Oral Tab Take 1 tablet (5 mg total) by mouth 3 (three) times daily as needed for Muscle spasms. 20 tablet 0    insulin regular human, conc, (HUMULIN R U-500 KWIKPEN) 500 UNIT/ML Subcutaneous Solution Pen-injector Inject 0.24 mL (120 Units total) into the skin daily with breakfast AND 0.24 mL (120 Units total) daily with lunch AND 0.25 mL (125 Units total) daily with dinner. 24 mL 2    Insulin Pen Needle 32G X 4 MM Does not apply Misc Inject 3x daily 300 each 0    dapagliflozin (FARXIGA) 10 MG Oral Tab Take 1 tablet (10 mg total) by mouth daily. 90 tablet 1    meclizine 25 MG Oral Tab Take 1 tablet (25 mg total) by mouth 3 (three) times daily as needed for Dizziness. 30 tablet 0    semaglutide (OZEMPIC, 1 MG/DOSE,) 4 MG/3ML Subcutaneous Solution Pen-injector Inject 1 mg into the skin once a week. 9 mL 1    atorvastatin 40 MG Oral Tab TAKE 1 TABLET BY MOUTH EVERY DAY AT NIGHT 90 tablet 3    metFORMIN  MG Oral Tablet 24 Hr TAKE FOUR TABLETS BY MOUTH DAILY WITH BREAKFAST. 360 tablet 3    lisinopril 5 MG Oral Tab Take 1 tablet (5 mg total) by mouth daily. 90 tablet  3    TRAZODONE 50 MG Oral Tab TAKE 1 TABLET BY MOUTH NIGHTLY AS NEEDED FOR SLEEP 90 tablet 1    levonorgestrel 20 MCG/24HR Intrauterine IUD Mirena 20 mcg/24 hr (5 years) intrauterine device   Take by intrauterine route.      Continuous Blood Gluc  (DEXCOM G7 ) Does not apply Device 1 each daily. 1 each 0    OneTouch Delica Lancets 33G Does not apply Misc 3 each daily. Use to check blood sugars three times daily 300 each 0    Glucose Blood (ONETOUCH ULTRA) In Vitro Strip Test blood sugars 3x daily 300 each 0    Glucose Blood (CONTOUR NEXT TEST) In Vitro Strip Test 3x daily 300 each 1    Microlet Lancets Does not apply Misc Test 3x daily 300 each 1    ofloxacin 0.3 % Ophthalmic Solution INSTILL 1 DROP IN RIGHT EYE FOUR TIMES A DAY START EYE DROPS AFTER SURGERY, USE FOR ONE WEEK (Patient not taking: Reported on 10/17/2023)      naproxen 500 MG Oral Tab Take 1 tablet (500 mg total) by mouth 2 (two) times daily with meals. 28 tablet 0    Insulin Pen Needle ([employer] PEN NEEDLES) 32G X 4 MM Does not apply Misc Inject three times a day. 300 each 0    Glucose Blood In Vitro Strip Check sugars four times a day. 300 each 0    Insulin Syringe-Needle U-100 31G X 5/16\" 0.5 ML Does not apply Misc Inject three times daily 100 each 0     Allergies:   Allergies   Allergen Reactions    Cat Dander [Dander]     Iodine (Topical)     Seasonal     Nickel RASH    No Known Allergies ITCHING       Past Medical History:   Diagnosis Date    Allergic rhinitis     Asthma     allergy induced    Carpal tunnel syndrome     Diabetes (HCC)     diabetes for 2011    Eczema     2-3 years ago, only on right fingers    Essential hypertension     Normal BP readings at home    GDM (gestational diabetes mellitus)     High blood pressure     High cholesterol 02/07/2014    HORMONE DISORDER     PCOS    Infertility female     Obesity     Other and unspecified hyperlipidemia 02/07/2014    PCOS (polycystic ovarian syndrome)     Seasonal allergies      Shoulder tendonitis     Type II or unspecified type diabetes mellitus without mention of complication, uncontrolled 2014     Past Surgical History:   Procedure Laterality Date    ADENOIDECTOMY  1980      ,    CARPAL TUNNEL RELEASE Right     CATARACT EXTRACTION W/  INTRAOCULAR LENS IMPLANT Right 2023    Dr. Ny @ [facility]    COLONOSCOPY N/A 02/15/2022    Procedure: COLONOSCOPY;  Surgeon: Bobby Ann MD;  Location: [facility] ENDOSCOPY    D & C  ?    LAPAROSCOPIC APPENDECTOMY  2015    OTHER SURGICAL HISTORY      IVF    REDUCTION OF LARGE BREAST  1998    TONSILLECTOMY       Social History     Socioeconomic History    Marital status:      Spouse name: Not on file    Number of children: 1    Years of education: Not on file    Highest education level: Not on file   Occupational History    Occupation: speech therapist   Tobacco Use    Smoking status: Never    Smokeless tobacco: Never   Vaping Use    Vaping Use: Never used   Substance and Sexual Activity    Alcohol use: Never    Drug use: Never    Sexual activity: Yes   Other Topics Concern     Service Not Asked    Blood Transfusions Not Asked    Caffeine Concern Yes     Comment: soda-1 cup/day    Occupational Exposure Not Asked    Hobby Hazards Not Asked    Sleep Concern Not Asked    Stress Concern Not Asked    Weight Concern Not Asked    Special Diet Not Asked    Back Care Not Asked    Exercise Not Asked    Bike Helmet Not Asked    Seat Belt Not Asked    Self-Exams Not Asked    Grew up on a farm Not Asked    History of tanning Not Asked    Outdoor occupation Not Asked    Breast feeding Not Asked    Reaction to local anesthetic No    Pt has a pacemaker No    Pt has a defibrillator No   Social History Narrative    Not on file     Social Determinants of Health     Financial Resource Strain: Not on file   Food Insecurity: Not on file   Transportation Needs: Not on file   Physical Activity: Not on file   Stress:  Not on file   Social Connections: Not on file   Housing Stability: Not on file     Family History   Problem Relation Age of Onset    Hypertension Father     Obesity Father     Cancer Paternal Grandfather         Esophageal cancer    Diabetes Paternal Grandmother     Stroke Paternal Grandmother     Cancer Son         Medulloblastoma    Glaucoma Neg     Macular degeneration Neg        There were no vitals filed for this visit.    HPI:  Chief Complaint   Patient presents with    Upper Body Exam     LOV 01/06/23- Pt presents for an Upper Body Skin Exam. Pt has several lesions- following up on growth on the Left scalp (near hairline) and abdomen. Pt denies changes to lesions. Pt has small, itchy bumps that appeared around the Right pointer finger x 2 months. Pt has been using hydrocortisone and regular lotion without improvement to appearance.   Pt denies a personal or family Hx of skin ca      Fair skin lots of sun in the past.  Outdoors with her children and sports  No personal  or family history of skin cancer    Patient presents with concerns above.    Patient has been in their usual state of health.     Past notes/ records and appropriate/relevant lab results including pathology and past body maps reviewed. Including outside notes/ PCP notes as appropriate. Updated and new information noted in current visit.     ROS:  Denies other relevant systemic complaints.      History, medications, allergies reviewed as noted.       Physical Examination:     Well-developed well-nourished patient alert oriented in no acute distress.  Exam performed, including scalp, head, neck, face,nails, hair, external eyes, including conjunctival mucosa, eyelids, lips external ears , arms, digits,palms.     Multiple light to medium brown, well marginated, uniformly pigmented, macules and papules 6 mm and less scattered on exam. pigmented lesions examined with dermoscopy benign-appearing patterns.     Waxy tannish keratotic papules scattered,  cherry-red vascular papules scattered.    See map today's date for lesions noted .  See assessment and plan below for specific lesions.    Otherwise remarkable for lesions as noted on map.    See A/P  below for additional information:    Assessment / plan:    No orders of the defined types were placed in this encounter.      Meds & Refills for this Visit:  Requested Prescriptions     Signed Prescriptions Disp Refills    fluocinonide 0.05 % External Cream 60 g 3     Sig: Use twice daily on affected areas, itchy bumps on hands         Encounter Diagnoses   Name Primary?    SK (seborrheic keratosis) Yes    Blue nevus     Multiple nevi     Epidermal cyst     Benign neoplasm of skin, unspecified location     Dyshidrotic eczema        Dyshidrotic changes over the hands      See patient photos  Dermatitis.  Meds in grid.  Skin care instructions reviewed.  [place] use of emollients.  Pathophysiology reviewed.  Consider Contac allergy in differential.  Consider patch testing.  Patient will let us know how they are doing over the next several weeks.  Await clinical response to above therapies.  Lidex cream twice daily, continue current moisturizers, if worsening will let us know.    More waxy tan keratotic papule at left temple trial of cryo reassurance.    History of epidermal cyst at left hip stable.  Cyst on upper abdomen stable  Blue nevus left cheek stable, scattered smaller nevi stable reassurance.  Multiple cherry neuromas as noted previously reassurance.  No suspicious nevi.  Continue sun protection regular skin checks.    Fair skin more erythema over the face,No other susupicious lesions on todays  exam.      No other susupicious lesions on todays  exam.      Please refer to map for specific lesions.  See additional diagnoses.  Pros cons of various therapies, risks benefits discussed.Pathophysiology discussed with patient.  Therapeutic options reviewed.  See  Medications in grid.  Instructions reviewed at  length.    Benign nevi, seborrheic  keratoses, cherry angiomas:  Reassurance regarding other benign skin lesions.Signs and symptoms of skin cancer, ABCDE's of melanoma discussed with patient. Sunscreen use, sun protection, self exams reviewed.  Followup as noted RTC ---routine checkup    6 mos -one year or p.r.n.    Encounter Times   Including precharting, reviewing chart, prior notes obtaining history: 10 minutes, medical exam :10 minutes, notes on body map, plan, counseling 10minutes My total time spent caring for the patient on the day of the encounter: 30 minutes     The patient indicates understanding of these issues and agrees to the plan.  The patient is asked to return as noted in follow-up/ above.    This note was generated using Dragon voice recognition software.  Please contact me regarding any confusion resulting from errors in recognition..

## 2024-01-29 NOTE — TELEPHONE ENCOUNTER
Please review; protocol failed/No Protocol  Requested Prescriptions   Pending Prescriptions Disp Refills    TRAZODONE 50 MG Oral Tab [Pharmacy Med Name: TRAZODONE 50 MG TABLET] 90 tablet 2     Sig: TAKE 1 TABLET BY MOUTH NIGHTLY AS NEEDED FOR SLEEP       There is no refill protocol information for this order        Future Appointments         Provider Department Appt Notes    In 2 weeks Corinna Benítez MD [facility] - OB/GYN IUD exchange    In 1 month Cheikh Velasco MD [facility] [address], [place] LEFT THUMB PAIN    In 2 months Sorin Persaud APRN [facility] Follow up          Recent Outpatient Visits              2 days ago SK (seborrheic keratosis)    [facility] Marlene Zambrano MD    Office Visit    2 weeks ago Encounter for gynecological examination without abnormal finding    [facility] - OB/GYN Corinna Benítez MD    Office Visit    2 months ago Tenosynovitis of radial styloid    [facility] [address], [place] Cheikh Velasco MD    Office Visit    2 months ago Routine medical exam    [facility], Jessica Moore MD    Office Visit    3 months ago Type 2 diabetes mellitus with mild nonproliferative retinopathy without macular edema, with long-term current use of insulin, unspecified laterality (HCC)    [facility] Sorin Persaud APRN    Office Visit

## 2024-01-30 RX ORDER — TRAZODONE HYDROCHLORIDE 50 MG/1
50 TABLET ORAL NIGHTLY PRN
Qty: 90 TABLET | Refills: 2 | Status: SHIPPED | OUTPATIENT
Start: 2024-01-30

## 2024-02-12 ENCOUNTER — OFFICE VISIT (OUTPATIENT)
Dept: OBGYN CLINIC | Facility: CLINIC | Age: 49
End: 2024-02-12
Payer: COMMERCIAL

## 2024-02-12 VITALS
BODY MASS INDEX: 44 KG/M2 | DIASTOLIC BLOOD PRESSURE: 70 MMHG | SYSTOLIC BLOOD PRESSURE: 105 MMHG | WEIGHT: 249.19 LBS | HEART RATE: 94 BPM

## 2024-02-12 DIAGNOSIS — Z30.433 ENCOUNTER FOR REMOVAL AND REINSERTION OF INTRAUTERINE CONTRACEPTIVE DEVICE (IUD): Primary | ICD-10-CM

## 2024-02-12 NOTE — PROCEDURES
IUD Removal & Insertion     Birth control method(s) used:      Consent signed.    Procedure discussed with the patient in detail including indication, risks, benefits, alternatives and complications.    Pelvic Exam Findings:  Lesion description:  IUD strings visualized at cervix.    Procedure:  Removal: Speculum placed in the vagina.    Betadine wash of vagina and cervix.    An Allis clamp used to grasp IUD strings.    Mirena IUD was removed without difficulty.    Insertion: Single tooth tenaculum was placed at the 12 o'clock position.    Uterus sounded to 10.5 cm.    Mirena IUD was placed without difficulty.    Strings cut at 3 cm.    Single tooth tenaculum removed.    Silver nitrate used to achieve hemostasis.    Good hemostasis noted.    Patient tolerated procedure well.      Visit Plan:  IUD surveillance was discussed with the patient.

## 2024-03-25 ENCOUNTER — TELEPHONE (OUTPATIENT)
Dept: FAMILY MEDICINE CLINIC | Facility: CLINIC | Age: 49
End: 2024-03-25

## 2024-03-25 NOTE — TELEPHONE ENCOUNTER
Patient is requesting referral. Please call patient to notify her if referral will be completed before appt.    Name of specialist and specialty department : Che[facility] Torres (Hand & Upper Extremity Surgery; Orthopedic Surgery)      Reason for visit with the specialist: Follow-up    Address of the specialist office: [address] [place] [address]    Appointment date: 03/27/2024         CSS informed patient the turnaround time for referral is 5-7 business days.  Patient was informed to check their SecureAuth account for referral status.

## 2024-03-26 NOTE — MR AVS SNAPSHOT
[address], [address]  [address] [facility]  [phone]               Thank you for choosing us for your health care visit with Milagros Cruz DO.   We are glad to serve you and happy to provide you with this summary Try these videos 3-5 times per week  Optokinetic Exercises    [email or web address]    [email or web address]   [facility] ([address])    [address] [address]   [phone]           Do NOT use deodorant, talcum powder, lotions, or creams on your breasts or underarms.  They leave a coating quan Insulin Syringe-Needle U-100 31G X 5/16\" 0.5 ML Misc   Inject three times daily   What changed:  additional instructions           lisinopril 5 MG Tabs   Take 1 tablet (5 mg total) by mouth once daily.    Commonly known as:  PRINIVIL,ZESTRIL           Met

## 2024-03-27 RX ORDER — SEMAGLUTIDE 1.34 MG/ML
1 INJECTION, SOLUTION SUBCUTANEOUS
Qty: 9 ML | Refills: 1 | Status: SHIPPED | OUTPATIENT
Start: 2024-03-27

## 2024-04-21 ENCOUNTER — PATIENT MESSAGE (OUTPATIENT)
Dept: FAMILY MEDICINE CLINIC | Facility: CLINIC | Age: 49
End: 2024-04-21

## 2024-04-21 DIAGNOSIS — G89.29 OTHER CHRONIC PAIN: ICD-10-CM

## 2024-04-21 DIAGNOSIS — M79.645 PAIN IN FINGER OF LEFT HAND: Primary | ICD-10-CM

## 2024-04-22 NOTE — TELEPHONE ENCOUNTER
From: Kay Tracy  To: Jessica Wong  Sent: 4/21/2024 10:39 AM CDT  Subject: Referral for follow up    Hello - I have a follow up with Dr. Velasco for my thumb pain. I had a cortisone shot a few months ago and need to follow up. My appointment is June 5. Thank you!

## 2024-04-26 RX ORDER — ATORVASTATIN CALCIUM 40 MG/1
TABLET, FILM COATED ORAL
Qty: 90 TABLET | Refills: 3 | Status: SHIPPED | OUTPATIENT
Start: 2024-04-26

## 2024-04-26 RX ORDER — DAPAGLIFLOZIN 10 MG/1
10 TABLET, FILM COATED ORAL DAILY
Qty: 90 TABLET | Refills: 1 | Status: SHIPPED | OUTPATIENT
Start: 2024-04-26

## 2024-04-26 RX ORDER — LISINOPRIL 5 MG/1
5 TABLET ORAL DAILY
Qty: 90 TABLET | Refills: 3 | Status: SHIPPED | OUTPATIENT
Start: 2024-04-26

## 2024-04-26 NOTE — TELEPHONE ENCOUNTER
Endocrine Refill protocol for oral and injectable diabetic medications    Protocol Criteria:    -Appointment with Endocrinology completed in the last 6 months or scheduled in the next 3 months    -A1c result <8.5% in the past 6 months      Verify the above has been completed or scheduled in the appropriate timeline. If so can send a 90 day supply with 1 refill.     Last completed office visit: 7/11/24  Next scheduled Follow up:   Future Appointments   Date Time Provider Department Center   6/5/2024  9:00 AM Cheikh Velasco MD [facility] [place]   7/11/2024  9:30 AM Isufi, Marvina, APRN [facility]     Last A1c result: Last A1c value was 6.1% done 10/17/2023.

## 2024-04-27 RX ORDER — METFORMIN HYDROCHLORIDE 500 MG/1
TABLET, EXTENDED RELEASE ORAL
Qty: 360 TABLET | Refills: 3 | Status: SHIPPED | OUTPATIENT
Start: 2024-04-27

## 2024-04-27 NOTE — TELEPHONE ENCOUNTER
Please review; protocol failed/no protocol.     Requested Prescriptions   Pending Prescriptions Disp Refills    metFORMIN  MG Oral Tablet 24 Hr [Pharmacy Med Name: METFORMIN HCL  MG TABLET] 360 tablet 3     Sig: TAKE FOUR TABLETS BY MOUTH DAILY WITH BREAKFAST.       Diabetes Medication Protocol Failed - 4/26/2024 12:44 AM        Failed - Last A1C < 7.5 and within past 6 months     Lab Results   Component Value Date    A1C 6.1 (A) 10/17/2023             Passed - In person appointment or virtual visit in the past 6 mos or appointment in next 3 mos     Recent Outpatient Visits              2 months ago Encounter for removal and reinsertion of intrauterine contraceptive device (IUD)    [facility] - OB/GYN Corinna Benítez MD    Office Visit    3 months ago SK (seborrheic keratosis)    [facility] Marlene Zambrano MD    Office Visit    3 months ago Encounter for gynecological examination without abnormal finding    [facility] - OB/[facility] Corinna Benítez MD    Office Visit    5 months ago Tenosynovitis of radial styloid    [facility] [address], [place] Cheikh Velasco MD    Office Visit    5 months ago Routine medical exam    [facility], Jessica Moore MD    Office Visit          Future Appointments         Provider Department Appt Notes    In 1 month Cheikh Velasco MD [facility] [address], [place] LEFT THUMB PAIN    In 2 months Sorin Persaud APRN [facility] Follow up                    Passed - Microalbumin procedure in past 12 months or taking ACE/ARB        Passed - EGFRCR or GFRNAA > 50     GFR Evaluation  EGFRCR: 110 , resulted on 12/16/2023          Passed - GFR in the past 12 months         Signed Prescriptions Disp Refills    lisinopril 5 MG Oral  Tab 90 tablet 3     Sig: Take 1 tablet (5 mg total) by mouth daily.       Hypertension Medications Protocol Passed - 4/26/2024 12:44 AM        Passed - CMP or BMP in past 12 months        Passed - Last BP reading less than 140/90     BP Readings from Last 1 Encounters:   02/12/24 105/70               Passed - In person appointment or virtual visit in the past 12 mos or appointment in next 3 mos     Recent Outpatient Visits              2 months ago Encounter for removal and reinsertion of intrauterine contraceptive device (IUD)    [facility] - OB/GYN Corinna Benítez MD    Office Visit    3 months ago SK (seborrheic keratosis)    [facility] Marlene Zambrano MD    Office Visit    3 months ago Encounter for gynecological examination without abnormal finding    [facility] - OB/GYN Corinna Benítez MD    Office Visit    5 months ago Tenosynovitis of radial styloid    [facility] [address], [place] Cheikh Velasco MD    Office Visit    5 months ago [facility]ine medical exam    [facility]    [facility]          Future Appointments         Provider Department Appt Notes    In 1 month Cheikh Velasco MD [facility] [address], [place] LEFT THUMB PAIN    In 2 months Sorin Persaud APRN [facility] Follow up                    Passed - EGFRCR or GFRNAA > 50     GFR Evaluation  EGFRCR: 110 , resulted on 12/16/2023            atorvastatin 40 MG Oral Tab 90 tablet 3     Sig: TAKE 1 TABLET BY MOUTH EVERY DAY AT NIGHT       Cholesterol Medication Protocol Passed - 4/26/2024 12:44 AM        Passed - ALT < 80     Lab Results   Component Value Date    ALT 23 12/16/2023             Passed - ALT resulted within past year        Passed - Lipid panel within past 12  months     Lab Results   Component Value Date    CHOLEST 134 12/16/2023    TRIG 106 12/16/2023    HDL 51 12/16/2023    LDL 64 12/16/2023    VLDL 16 12/16/2023    NONHDLC 83 12/16/2023             Passed - In person appointment or virtual visit in the past 12 mos or appointment in next 3 mos     Recent Outpatient Visits              2 months ago Encounter for removal and reinsertion of intrauterine contraceptive device (IUD)    [facility]    Office Visit    3 months ago SK (seborrheic keratosis)    [facility] Marlene Zambrano MD    Office Visit    3 months ago Encounter for gynecological examination without abnormal finding    [facility]Cor[facility]    Office Visit    5 months ago Tenosynovitis of radial styloid    [facility] [address], [place] Cheikh Velasco MD    Office Visit    5 months ago [facility]ine medical exam    [facility]    [facility]          Future Appointments         Provider Department Appt Notes    In 1 month Cheikh Velasco MD [facility] [address], [place] LEFT THUMB PAIN    In 2 months Sorin Persaud APRN [facility] Follow up                          Recent Outpatient Visits              2 months ago Encounter for removal and reinsertion of intrauterine contraceptive device (IUD)    [facility]    Office Visit    3 months ago SK (seborrheic keratosis)    [facility]    Office Visit    3 months ago Encounter for gynecological examination without abnormal finding    [facility]     Office Visit    5 months ago Tenosynovitis of radial styloid    [facility] [address], [place] Cheikh Velasco MD    Office Visit    5 months ago Routine medical exam    [facility] [address], Jessica Moore MD    Office Visit             Future Appointments         Provider Department Appt Notes    In 1 month Cheikh Velasco MD [facility] [address], [place] LEFT THUMB PAIN    In 2 months Sorin Persaud APRN [facility] Follow up

## 2024-04-27 NOTE — TELEPHONE ENCOUNTER
Refill passed per [facility] protocol.     Requested Prescriptions   Pending Prescriptions Disp Refills    METFORMIN  MG Oral Tablet 24 Hr [Pharmacy Med Name: METFORMIN HCL  MG TABLET] 360 tablet 3     Sig: TAKE FOUR TABLETS BY MOUTH DAILY WITH BREAKFAST.       Diabetes Medication Protocol Failed - 4/26/2024 12:44 AM        Failed - Last A1C < 7.5 and within past 6 months     Lab Results   Component Value Date    A1C 6.1 (A) 10/17/2023             Passed - In person appointment or virtual visit in the past 6 mos or appointment in next 3 mos     Recent Outpatient Visits              2 months ago Encounter for removal and reinsertion of intrauterine contraceptive device (IUD)    [facility] - OB/GYN Corinna Benítez MD    Office Visit    3 months ago SK (seborrheic keratosis)    [facility] Marlene Zambrano MD    Office Visit    3 months ago Encounter for gynecological examination without abnormal finding    [facility] - OB/[facility] Corinna Benítez MD    Office Visit    5 months ago Tenosynovitis of radial styloid    [facility] [address], [place] Cheikh Velasco MD    Office Visit    5 months ago Routine medical exam    [facility], Jessica Moore MD    Office Visit          Future Appointments         Provider Department Appt Notes    In 1 month Cheikh Velasco MD [facility] [address], [place] LEFT THUMB PAIN    In 2 months Sorin Persaud APRN [facility] Follow up                    Passed - Microalbumin procedure in past 12 months or taking ACE/ARB        Passed - EGFRCR or GFRNAA > 50     GFR Evaluation  EGFRCR: 110 , resulted on 12/16/2023          Passed - GFR in the past 12 months          LISINOPRIL 5 MG Oral Tab [Pharmacy Med Name:  LISINOPRIL 5 MG TABLET] 90 tablet 3     Sig: Take 1 tablet (5 mg total) by mouth daily.       Hypertension Medications Protocol Passed - 4/26/2024 12:44 AM        Passed - CMP or BMP in past 12 months        Passed - Last BP reading less than 140/90     BP Readings from Last 1 Encounters:   02/12/24 105/70               Passed - In person appointment or virtual visit in the past 12 mos or appointment in next 3 mos     Recent Outpatient Visits              2 months ago Encounter for removal and reinsertion of intrauterine contraceptive device (IUD)    [facility] - OB/GYN Corinna Benítez MD    Office Visit    3 months ago SK (seborrheic keratosis)    [facility] Marlene Zambrano MD    Office Visit    3 months ago Encounter for gynecological examination without abnormal finding    [facility] - OB/[facility] Corinna Benítez MD    Office Visit    5 months ago Tenosynovitis of radial styloid    [facility] [address], [place] Cheikh Velasco MD    Office Visit    5 months ago Routine medical exam    [facility], Jessica Moore MD    Office Visit          Future Appointments         Provider Department Appt Notes    In 1 month Cheikh Velasco MD [facility] [address], [place] LEFT THUMB PAIN    In 2 months Sorin Persaud APRN [facility] Follow up                    Passed - EGFRCR or GFRNAA > 50     GFR Evaluation  EGFRCR: 110 , resulted on 12/16/2023            ATORVASTATIN 40 MG Oral Tab [Pharmacy Med Name: ATORVASTATIN 40 MG TABLET] 90 tablet 3     Sig: TAKE 1 TABLET BY MOUTH EVERY DAY AT NIGHT       Cholesterol Medication Protocol Passed - 4/26/2024 12:44 AM        Passed - ALT < 80     Lab Results   Component Value Date    ALT 23 12/16/2023             Passed - ALT  resulted within past year        Passed - Lipid panel within past 12 months     Lab Results   Component Value Date    CHOLEST 134 12/16/2023    TRIG 106 12/16/2023    HDL 51 12/16/2023    LDL 64 12/16/2023    VLDL 16 12/16/2023    NONHDLC 83 12/16/2023             Passed - In person appointment or virtual visit in the past 12 mos or appointment in next 3 mos     Recent Outpatient Visits              2 months ago Encounter for removal and reinsertion of intrauterine contraceptive device (IUD)    [facility] OB/GYN Corinna Benítez MD    Office Visit    3 months ago SK (seborrheic keratosis)    [facility] Marlene Zambr[facility] MD    Office Visit    3 months ago Encounter for gynecological examination without abnormal finding    [facility] - OB/GYN Corinna Benítez MD    Office Visit    5 months ago Tenosynovitis of radial styloid    [facility] [address], [place] Cheikh Velasco MD    Office Visit    5 months ago [facility]ine medical exam    [facility]    [facility]          Future Appointments         Provider Department Appt Notes    In 1 month Cheikh Velasco MD [facility] [address], [place] LEFT THUMB PAIN    In 2 months Sorin Persaud APRN [facility] Follow up                          Future Appointments         Provider Department Appt Notes    In 1 month Cheikh Velasco MD [facility] [address], [place] LEFT THUMB PAIN    In 2 months Sorin Persaud APRN [facility] Follow up             Recent Outpatient Visits              2 months ago Encounter for removal and reinsertion of intrauterine contraceptive device (IUD)    [facility] -  OB/GYN Corinna Benítez MD    Office Visit    3 months ago SK (seborrheic keratosis)    [facility] Marlene Zambrano MD    Office Visit    3 months ago Encounter for gynecological examination without abnormal finding    [facility] - OB/GYN Corinna Benítez MD    Office Visit    5 months ago Tenosynovitis of radial styloid    [facility] [place] Patel, Kushal, MD    Office Visit    5 months ago Routine medical exam    [facility], Jessica Moore MD    Office Visit

## 2024-04-29 RX ORDER — INSULIN HUMAN 500 [IU]/ML
INJECTION, SOLUTION SUBCUTANEOUS
Qty: 24 ML | Refills: 2 | Status: SHIPPED | OUTPATIENT
Start: 2024-04-29

## 2024-04-29 NOTE — TELEPHONE ENCOUNTER
Endocrine refill protocol for basal insulins     Protocol Criteria:  - Appointment with Endocrinology completed in the last 6 months or scheduled in the next 3 months    - A1c result completed in the last 6 months and is <8.5%     Verify appointment has been completed or scheduled in the appropriate timeline. If so can send a 90 day supply with 1 refill per provider protocol.    Verify A1c has been completed within the last 6 months and is below 8.5%     Last completed office visit:10/17/23  Next scheduled Follow up: 07/11/24  Last A1c result: Last A1c value was 6.1% done 10/17/2023.

## 2024-05-02 RX ORDER — ACYCLOVIR 400 MG/1
1 TABLET ORAL
Qty: 9 EACH | Refills: 1 | Status: SHIPPED | OUTPATIENT
Start: 2024-05-02

## 2024-05-02 NOTE — TELEPHONE ENCOUNTER
Endocrine Refill protocol for CGM supplies       Protocol Criteria:  Appointment with Endocrinology completed in the last 12 months or scheduled in the next 6 months     Verify appointment has been completed or scheduled in the appropriate timeline. If so can send a 90 day supply with 1 refill.        Last completed office visit:10/17/23  Next scheduled Follow up: 07/11/24

## 2024-05-03 ENCOUNTER — OFFICE VISIT (OUTPATIENT)
Dept: INTERNAL MEDICINE CLINIC | Facility: CLINIC | Age: 49
End: 2024-05-03
Payer: COMMERCIAL

## 2024-05-03 ENCOUNTER — NURSE TRIAGE (OUTPATIENT)
Dept: FAMILY MEDICINE CLINIC | Facility: CLINIC | Age: 49
End: 2024-05-03

## 2024-05-03 VITALS
WEIGHT: 250 LBS | SYSTOLIC BLOOD PRESSURE: 106 MMHG | HEIGHT: 63 IN | BODY MASS INDEX: 44.3 KG/M2 | DIASTOLIC BLOOD PRESSURE: 77 MMHG | HEART RATE: 105 BPM | RESPIRATION RATE: 18 BRPM

## 2024-05-03 DIAGNOSIS — M62.830 BACK MUSCLE SPASM: Primary | ICD-10-CM

## 2024-05-03 DIAGNOSIS — B35.1 FUNGAL TOENAIL INFECTION: ICD-10-CM

## 2024-05-03 PROCEDURE — 3078F DIAST BP <80 MM HG: CPT | Performed by: INTERNAL MEDICINE

## 2024-05-03 PROCEDURE — 99213 OFFICE O/P EST LOW 20 MIN: CPT | Performed by: INTERNAL MEDICINE

## 2024-05-03 PROCEDURE — 3074F SYST BP LT 130 MM HG: CPT | Performed by: INTERNAL MEDICINE

## 2024-05-03 PROCEDURE — 3008F BODY MASS INDEX DOCD: CPT | Performed by: INTERNAL MEDICINE

## 2024-05-03 RX ORDER — CICLOPIROX 80 MG/ML
1 SOLUTION TOPICAL NIGHTLY
Qty: 6 ML | Refills: 1 | Status: SHIPPED | OUTPATIENT
Start: 2024-05-03 | End: 2024-05-06

## 2024-05-03 RX ORDER — CYCLOBENZAPRINE HCL 5 MG
5 TABLET ORAL 3 TIMES DAILY PRN
Qty: 20 TABLET | Refills: 0 | Status: SHIPPED | OUTPATIENT
Start: 2024-05-03

## 2024-05-03 NOTE — TELEPHONE ENCOUNTER
Please see patient mychart message below. Patient stated that her back pain is a 5/10 with movement. Patient also has the back pain when she lays down or lays on her left side. Patient was inform she   Future Appointments   Date Time Provider Department Center   5/3/2024  4:00 PM Claudia Moreno MD [id number] [place]                         4/30/24 11:01 AM  Note  Last appointment with Dr. Wnog 11/21/23     From: Kay Tracy  To: Jessica Wong  Sent: 4/30/2024  9:22 AM CDT  Subject: Back/side pain     Hello - I saw one of the docs a while back now for a back spasm in the left mid back area.  It has come and gone a few times and the muscle relaxer has helped a little.  But about 4 weeks ago I had it again and it was really bad, lasted about 2.5 weeks.  The spasm has stopped but I still have pain and now it has come around the side into my rib area on the left.  I have been using ice and heat for almost a month and still having pain, along with ibuprofen some days.  Just wondering if there is something else I can do or if I should come in?  Thank you!             Reason for Disposition   MODERATE back pain (e.g., interferes with normal activities) and present > 3 days    Protocols used: Back Pain-A-[place]

## 2024-05-03 NOTE — PROGRESS NOTES
Kay Tracy is a 48 year old female.  Chief Complaint   Patient presents with    Back Pain     HPI:   Patient presented today for follow up. She states that she gets flare up for her back spasm since last two years. And feels like that it is acting up again. Pain is mostly in her back with radiation to the Left side. No rash, no bruising, no trauma. Had work up with x ray few months ago which was normal.    Also noticed fungal toe nail infection on R toe >than L.     Current Outpatient Medications   Medication Sig Dispense Refill    cyclobenzaprine 5 MG Oral Tab Take 1 tablet (5 mg total) by mouth 3 (three) times daily as needed for Muscle spasms. 20 tablet 0    Ciclopirox 8 % External Solution Apply 1 mL topically nightly. 6 mL 1    Continuous Glucose Sensor (DEXCOM G7 SENSOR) Does not apply Misc 1 each Every 10 days. 9 each 1    insulin regular human, conc, (HUMULIN R U-500 KWIKPEN) 500 UNIT/ML Subcutaneous Solution Pen-injector Inject 0.24 mL (120 Units total) into the skin daily with breakfast AND 0.24 mL (120 Units total) daily with lunch AND 0.25 mL (125 Units total) daily with dinner. 24 mL 2    metFORMIN  MG Oral Tablet 24 Hr TAKE FOUR TABLETS BY MOUTH DAILY WITH BREAKFAST. 360 tablet 3    dapagliflozin (FARXIGA) 10 MG Oral Tab Take 1 tablet (10 mg total) by mouth daily. 90 tablet 1    lisinopril 5 MG Oral Tab Take 1 tablet (5 mg total) by mouth daily. 90 tablet 3    atorvastatin 40 MG Oral Tab TAKE 1 TABLET BY MOUTH EVERY DAY AT NIGHT 90 tablet 3    OZEMPIC, 1 MG/DOSE, 4 MG/3ML Subcutaneous Solution Pen-injector INJECT 1MG INTO THE SKIN ONCE A WEEK 9 mL 1    traZODone 50 MG Oral Tab Take 1 tablet (50 mg total) by mouth nightly as needed for Sleep. 90 tablet 2    fluocinonide 0.05 % External Cream Use twice daily on affected areas, itchy bumps on hands 60 g 3    Insulin Pen Needle 32G X 4 MM Does not apply Misc Inject 3x daily 300 each 0    meclizine 25 MG Oral Tab Take 1 tablet (25 mg total) by  mouth 3 (three) times daily as needed for Dizziness. 30 tablet 0    levonorgestrel 20 MCG/24HR Intrauterine IUD Mirena 20 mcg/24 hr (5 years) intrauterine device   Take by intrauterine route.        Past Medical History:    Allergic rhinitis    Asthma    allergy induced    Carpal tunnel syndrome    Diabetes (HCC)    diabetes for     Eczema    2-3 years ago, only on right fingers    Essential hypertension    Normal BP readings at home    GDM (gestational diabetes mellitus) (HCC)    High blood pressure    High cholesterol    HORMONE DISORDER    PCOS    Infertility female    Obesity    Other and unspecified hyperlipidemia    PCOS (polycystic ovarian syndrome)    Seasonal allergies    Shoulder tendonitis    Type II or unspecified type diabetes mellitus without mention of complication, uncontrolled      Past Surgical History:   Procedure Laterality Date    Adenoidectomy  1980      ,    Carpal tunnel release Right     Cataract extraction w/  intraocular lens implant Right 2023    Dr. Ny @ [facility]    Colonoscopy N/A 02/15/2022    Procedure: COLONOSCOPY;  Surgeon: oBbby Ann MD;  Location: [facility] ENDOSCOPY    D & c  ?    Laparoscopic appendectomy  2015    Other surgical history      IVF    Reduction of large breast      Tonsillectomy        Social History:  Social History     Socioeconomic History    Marital status:     Number of children: 1   Occupational History    Occupation: speech therapist   Tobacco Use    Smoking status: Never    Smokeless tobacco: Never   Vaping Use    Vaping status: Never Used   Substance and Sexual Activity    Alcohol use: Never    Drug use: Never    Sexual activity: Yes   Other Topics Concern    Caffeine Concern Yes     Comment: soda-1 cup/day    Reaction to local anesthetic No    Pt has a pacemaker No    Pt has a defibrillator No      Family History   Problem Relation Age of Onset    Hypertension Father     Obesity Father     Cancer  Paternal Grandfather         Esophageal cancer    Diabetes Paternal Grandmother     Stroke Paternal Grandmother     Cancer Son         Medulloblastoma    Glaucoma Neg     Macular degeneration Neg       Allergies   Allergen Reactions    Cat Dander [Dander]     Iodine (Topical)     Seasonal     Nickel RASH    No Known Allergies ITCHING        REVIEW OF SYSTEMS:   Review of Systems   Review of Systems   Constitutional: Negative for activity change, appetite change and fever.   HENT: Negative for congestion and voice change.    Respiratory: Negative for cough and shortness of breath.    Cardiovascular: Negative for chest pain.   Gastrointestinal: Negative for abdominal distention, abdominal pain and vomiting.   Genitourinary: Negative for hematuria.   Skin: Negative for wound.   Psychiatric/Behavioral: Negative for behavioral problems.   Wt Readings from Last 5 Encounters:   05/03/24 250 lb (113.4 kg)   02/12/24 249 lb 3.2 oz (113 kg)   01/10/24 246 lb (111.6 kg)   11/22/23 247 lb (112 kg)   11/21/23 247 lb 3.2 oz (112.1 kg)     Body mass index is 44.29 kg/m².      EXAM:   /77 (BP Location: Right arm, Patient Position: Sitting, Cuff Size: large)   Pulse 105   Resp 18   Ht 5' 3\" (1.6 m)   Wt 250 lb (113.4 kg)   BMI 44.29 kg/m²   Physical Exam   Constitutional:       Appearance: Normal appearance.   HENT:      Head: Normocephalic.   Eyes:      Conjunctiva/sclera: Conjunctivae normal.   Cardiovascular:      Rate and Rhythm: Normal rate and regular rhythm.      Heart sounds: Normal heart sounds. No murmur heard.  Pulmonary:      Effort: Pulmonary effort is normal.      Breath sounds: Normal breath sounds. No rhonchi or rales.   Abdominal:      General: Bowel sounds are normal.      Palpations: Abdomen is soft.      Tenderness: There is no abdominal tenderness.   Musculoskeletal:      Cervical back: Neck supple.      Right lower leg: No edema.      Left lower leg: No edema.   Skin:     General: Skin is warm and  dry.   Neurological:      General: No focal deficit present.      Mental Status: He is alert and oriented to person, place, and time. Mental status is at baseline.   Psychiatric:         Mood and Affect: Mood normal.         Behavior: Behavior normal.       ASSESSMENT AND PLAN:   1. Back muscle spasm  - flexeril as needed  - tylenol as needed  - no heavy lifting  - heating pad  - Icy hot patches    2. Fungal toenail infection  - Ciclopirox 8 % External Solution; Apply 1 mL topically nightly.  Dispense: 6 mL; Refill: 1      The patient indicates understanding of these issues and agrees to the plan.    Claudia Moreno MD

## 2024-05-04 ENCOUNTER — TELEPHONE (OUTPATIENT)
Dept: INTERNAL MEDICINE CLINIC | Facility: CLINIC | Age: 49
End: 2024-05-04

## 2024-05-04 DIAGNOSIS — B35.1 FUNGAL TOENAIL INFECTION: ICD-10-CM

## 2024-05-04 NOTE — TELEPHONE ENCOUNTER
Pharmacy requesting pa for       Ciclopirox 8 % External Solution, Apply 1 mL topically nightly., Disp: 6 mL, Rfl: 1    KEY: [id number]

## 2024-05-05 NOTE — TELEPHONE ENCOUNTER
Please call the pharmacy regarding the quantity.     Information regarding your request  Ciclopirox 8 Solution is supplied in a non-breakable package size. The quantity submitted is not a whole multiple of that non-breakable package size. Please resubmit your request with a quantity that is a multiple of the non-breakable package size.

## 2024-05-06 ENCOUNTER — TELEPHONE (OUTPATIENT)
Dept: FAMILY MEDICINE CLINIC | Facility: CLINIC | Age: 49
End: 2024-05-06

## 2024-05-06 RX ORDER — CICLOPIROX 80 MG/ML
1 SOLUTION TOPICAL NIGHTLY
Qty: 6.6 ML | Refills: 1 | Status: SHIPPED | OUTPATIENT
Start: 2024-05-06

## 2024-05-06 NOTE — TELEPHONE ENCOUNTER
I called pharmacy and they confirmed Packaging size is as follows available at [facility]:    6.6 ML     Will need new RX and we can see if this goes through with insurance - pended if agreeable.     Medication Quantity Refills Start End   Ciclopirox 8 % External Solution 6 mL 1 5/3/2024 --   Sig:   Apply 1 mL topically nightly.     Route:   Topical

## 2024-05-15 ENCOUNTER — TELEPHONE (OUTPATIENT)
Dept: INTERNAL MEDICINE CLINIC | Facility: CLINIC | Age: 49
End: 2024-05-15

## 2024-05-15 NOTE — TELEPHONE ENCOUNTER
Ciclopirox 8 % External Solution, Apply 1 mL topically nightly., Disp: 6.6 mL, Rfl: 1      Key: [id number]  Patient Last name: Demarcus  : 1975

## 2024-05-22 ENCOUNTER — TELEPHONE (OUTPATIENT)
Dept: INTERNAL MEDICINE CLINIC | Facility: CLINIC | Age: 49
End: 2024-05-22

## 2024-05-22 NOTE — TELEPHONE ENCOUNTER
Ciclopirox 8 % External Solution, Apply 1 mL topically nightly., Disp: 6.6 mL, Rfl: 1      Pharmacy Message: alternative requested insurance denied the prior auth did you want to try terbinafine or have pt use a discount card?

## 2024-05-23 ENCOUNTER — TELEPHONE (OUTPATIENT)
Dept: FAMILY MEDICINE CLINIC | Facility: CLINIC | Age: 49
End: 2024-05-23

## 2024-05-23 NOTE — TELEPHONE ENCOUNTER
Ciclopirox 8 % External Solution, Apply 1 mL topically nightly., Disp: 6.6 mL, Rfl: 1      Alternative requested: insurance denied the prior auth. Did you want to try Terbinafine or have pt use a discount card?     Suggested alternatives: TERBINAFINE HCL 250MG tablet

## 2024-05-23 NOTE — TELEPHONE ENCOUNTER
If she can get discount card for ciclopirox that would be good. Want to avoid oral meds for now.

## 2024-05-31 ENCOUNTER — NURSE TRIAGE (OUTPATIENT)
Dept: FAMILY MEDICINE CLINIC | Facility: CLINIC | Age: 49
End: 2024-05-31

## 2024-05-31 NOTE — TELEPHONE ENCOUNTER
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  Denies change in vision or foreign object in eye.  No office visits available, discussed Immediate Care but patient states her HMO \"does not let her go to Immediate Care\" and family is leaving tomorrow on a vacation.  Thinks her son had pinkeye and that might be where she got it.  Advised try warm compress to eye, wash hands, use clean linens/pillowcases.  Call if symptoms worsen.  Patient stated understnaidng.

## 2024-06-03 RX ORDER — TOBRAMYCIN 3 MG/ML
1-2 SOLUTION/ DROPS OPHTHALMIC EVERY 6 HOURS
Qty: 1 EACH | Refills: 0 | Status: SHIPPED | OUTPATIENT
Start: 2024-06-03 | End: 2024-06-08

## 2024-06-03 NOTE — TELEPHONE ENCOUNTER
Yes thank you, I spoke to her driving into work.  She was to call to give pharmacy info as she had mentioned she was in [place]  Medication sent.  Thanks  LETY Wong

## 2024-06-03 NOTE — TELEPHONE ENCOUNTER
Dr. Chaves (On call): patient called and states she spoke to you this early morning.     Patient mentioned you needed pharmacy information, and also states you were going to send PRESCRIPTION for eye medication.     [facility] [place], [place]  [address].  I added to pharmacy list. LETY

## 2024-06-03 NOTE — TELEPHONE ENCOUNTER
"CC: Ms. Mckenzie Mari arrives today for management of Type 2 DM and review of chronic medical conditions.     HPI: Ms. Mckenzie Mari was diagnosed with Type 2 DM in 2004. She was diagnosed based on lab work. Initial treatment consisted of metformin and glimepiride. Insulin added in 8/2016 - began Toujeo. She states that she felt that this caused nausea, abd pain, chest pain. Lantus caused throat swelling, left arm pain. She was then changed to Novolog 70/30 but this was only used for a short period because her insurance didn't cover.  Then changed to Humalog 50-50 in 12/2016. In 2017, she began MDI with Tresiba and Humalog. Jardiance added in 8/2021. Began use of Dexcom G6 in 2021.  + FH of DM in maternal aunt and cousin. Denies hospitalizations due to DM. Previously seen in endocrine by Richard Gupta, and MAGALI Eng NP. She has a history of thyroid cancer/post-surgical hypothyroidism.   Of note, she has a h/o pancreatitis.   She follows annually with cardiology (Dr. Mtz) for CHF, CAD.      Patient prefers to only make small changes to insulin doses when adjustments are recommended. Feels that insulin makes her blood sugars higher.    Last seen by me in March. Novolog dose was increased.     She states that she had she had BG in the 40s yesterday afternoon. Felt completely asymptomatic whereas when BG is less than 120, she feels very symptomatic. Did fingerstick to confirm and meter read "low." Prior to this, she took Novolog and has small serving of pasta and 3 pieces of meat.     BG monitoring per Dexcom G6.     Hypoglycemia: Rare      Missing Insulin/PO medication doses: No  Timing prandial insulin 5-15 minutes before meals: yes    Exercise: no    Dietary Habits: Eats 3 meals/day. Snacking occasionally. Avoids sugary drinks.    Last DM education: 1/2019    She will be staying with daughter in [place] to help her recover from hysterectomy.        CURRENT DIABETIC MEDS: Jardiance 25 mg daily, Tresiba 32 " LOV 10/31/2017.  Changed to 90 day supply per patient request. "units QAM, Novolog 12 units AC + correction scale, target 180, ISF 25  Vial or pen: pen  Glucometer type: [place] True Metrix    Previous DM treatments:   Invokana - nausea  Glimepiride - stopped when prandial insulin added  Touejo -  Nausea, abd pain, chest pain  Lantus - throat swelling, left arm pain  Novolog 70/30 - not covered by insurance  Metformin - headaches    Last Eye Exam: 6/13/2023 - No DR. Dr. Mcgregor.   Last Podiatry Exam: no    REVIEW OF SYSTEMS  Constitutional: no c/o fatigue, weakness, weight loss.   Cardiac: no chest pain, palpitations.   Respiratory: no cough. C/o dyspnea that is chronic. Uses albuterol as needed.  GI: no c/o nausea, abdominal pain. + H/o pancreatitis.   Skin: no rashes, lesions.  Neuro: + intermittent numbness, tingling in feet. Occasional headache   Endocrine: denies polyphagia, polydipsia, polyuria      Personally reviewed Past Medical, Surgical, Social History.    Vital Signs  /68 (BP Location: Left arm, Patient Position: Sitting, BP Method: Large (Manual))   Pulse 67   Ht 5' 2" (1.575 m)   Wt 94 kg (207 lb 5.5 oz)   SpO2 96%   BMI 37.92 kg/m²     Personally reviewed the below labs:    Hemoglobin A1C   Date Value Ref Range Status   05/23/2024 8.0 (H) 4.0 - 5.6 % Final     Comment:     ADA Screening Guidelines:  5.7-6.4%  Consistent with prediabetes  >or=6.5%  Consistent with diabetes    High levels of fetal hemoglobin interfere with the HbA1C  assay. Heterozygous hemoglobin variants (HbS, HgC, etc)do  not significantly interfere with this assay.   However, presence of multiple variants may affect accuracy.     02/27/2024 8.1 (H) 4.0 - 5.6 % Final     Comment:     ADA Screening Guidelines:  5.7-6.4%  Consistent with prediabetes  >or=6.5%  Consistent with diabetes    High levels of fetal hemoglobin interfere with the HbA1C  assay. Heterozygous hemoglobin variants (HbS, HgC, etc)do  not significantly interfere with this assay.   However, presence of multiple " variants may affect accuracy.     10/11/2023 8.4 (H) 4.0 - 5.6 % Final     Comment:     ADA Screening Guidelines:  5.7-6.4%  Consistent with prediabetes  >or=6.5%  Consistent with diabetes    High levels of fetal hemoglobin interfere with the HbA1C  assay. Heterozygous hemoglobin variants (HbS, HgC, etc)do  not significantly interfere with this assay.   However, presence of multiple variants may affect accuracy.         Chemistry        Component Value Date/Time     05/23/2024 0738    K 4.4 05/23/2024 0738     05/23/2024 0738    CO2 28 05/23/2024 0738    BUN 19 05/23/2024 0738    CREATININE 1.0 05/23/2024 0738     (H) 05/23/2024 0738        Component Value Date/Time    CALCIUM 10.1 05/23/2024 0738    ALKPHOS 81 05/23/2024 0738    AST 18 05/23/2024 0738    ALT 22 05/23/2024 0738    BILITOT 1.4 (H) 05/23/2024 0738          Lab Results   Component Value Date    CHOL 151 05/23/2024    CHOL 152 06/07/2023    CHOL 124 06/01/2022     Lab Results   Component Value Date    HDL 51 05/23/2024    HDL 47 06/07/2023    HDL 48 06/01/2022     Lab Results   Component Value Date    LDLCALC 70.2 05/23/2024    LDLCALC 66.4 06/07/2023    LDLCALC 50.2 (L) 06/01/2022     Lab Results   Component Value Date    TRIG 149 05/23/2024    TRIG 193 (H) 06/07/2023    TRIG 129 06/01/2022     Lab Results   Component Value Date    CHOLHDL 33.8 05/23/2024    CHOLHDL 30.9 06/07/2023    CHOLHDL 38.7 06/01/2022       Lab Results   Component Value Date    MICALBCREAT Unable to calculate 10/11/2023     Lab Results   Component Value Date    TSH 0.545 10/11/2023       CrCl cannot be calculated (Patient's most recent lab result is older than the maximum 7 days allowed.).    Vit D, 25-Hydroxy   Date Value Ref Range Status   11/08/2014 51 30 - 96 ng/mL Final     Comment:     Vitamin D deficiency.........<10 ng/mL                              Vitamin D insufficiency......10-29 ng/mL       Vitamin D sufficiency........> or equal to 30  ng/mL  Vitamin D toxicity............>100 ng/mL          Ref. Range 6/25/2020 07:27   FRUCTOSAMINE Latest Ref Range: SeeBelow umol /L 291         PHYSICAL EXAMINATION  Constitutional: Appears well, no distress.  Respiratory: CTA, even and unlabored.  Cardiovascular: RRR, no murmurs, no carotid bruits.   GI: active bowel sounds, no hernia  Skin: warm and dry  Neuro: oriented to person, place, time.   Feet: appropriate footwear.        DEXCOM DOWNLOAD: Fasting glucoses are stable. Infrequent prandial excursions. Two episodes of hypoglycemia yesterday, a few hours after placing new sensor. Otherwise, rare hypoglycemia.             Goals        HEMOGLOBIN A1C < 7.5          due to CAD, CHF, patient's desire for glucose to remain >150.       Assessment/Plan  1. Type 2 diabetes mellitus with diabetic neuropathy, with long-term current use of insulin  -- A1c is suboptimal but Dexcom report reveals much better control. Yesterday's events are perplexing. It's very odd that she was asymptomatic when she usually is very symptomatic w/ BG < 100.   -- continue current insulin doses, Jardiance   -- reduce Novolog dose by half if eating low carb meal.  -- BG monitoring per Dexcom G6.   -- Would not use Actos, due to CHF. GLP-1RA and DPP4-I contraindicated due to h/o pancreatitis. Cannot tolerate metformin.     -- Discussed diagnosis of DM, A1c goals, progression of disease, long term complications and tx options.  Advised patient to check BG before activities, such as driving or exercise.  -- Reviewed hypoglycemia management: treat with 1/2 glass of juice, 1/2 can regular coke, or 4 glucose tablets. Monitor and repeat treatment every 15 minutes until BG is >70 Then have a snack, which includes a complex carbohydrate and protein.    -- takes statin and ARB     2. Coronary artery disease involving native coronary artery without angina pectoris, unspecified whether native or transplanted heart  -- stable  -- Jardiance may offer  cardio-protective benefit.     3. CHF (NYHA class III, ACC/AHA stage C)  -- follows with cardiology   4. Postoperative hypothyroidism  -- controlled  -- h/o thyroid cancer  -- managed by PCP  -- TSH with RTC   5. Essential hypertension  -- controlled   -- continue ARB   6. Hyperlipidemia, unspecified hyperlipidemia type  -- controlled  -- continue Crestor every other day   7. Obesity (BMI 30-39.9)  -- stable   Body mass index is 37.92 kg/m².   8. History of pancreatitis  -- avoid GLP-1RA and DPP4-i       FOLLOW UP  Follow up in about 3 months (around 9/4/2024).   Patient instructed to bring BG logs to each follow up.   Patient encouraged to call for any BG/medication issues, concerns, or questions.      Orders Placed This Encounter   Procedures    Hemoglobin A1C    Basic Metabolic Panel    Microalbumin/Creatinine Ratio, Urine    TSH

## 2024-06-05 ENCOUNTER — OFFICE VISIT (OUTPATIENT)
Dept: ORTHOPEDICS CLINIC | Facility: CLINIC | Age: 49
End: 2024-06-05
Payer: COMMERCIAL

## 2024-06-05 VITALS — HEIGHT: 63 IN | BODY MASS INDEX: 44.3 KG/M2 | WEIGHT: 250 LBS

## 2024-06-05 DIAGNOSIS — M18.10 ARTHRITIS OF CARPOMETACARPAL (CMC) JOINT OF THUMB: Primary | ICD-10-CM

## 2024-06-05 PROCEDURE — 99213 OFFICE O/P EST LOW 20 MIN: CPT | Performed by: ORTHOPAEDIC SURGERY

## 2024-06-05 PROCEDURE — 3008F BODY MASS INDEX DOCD: CPT | Performed by: ORTHOPAEDIC SURGERY

## 2024-06-05 NOTE — PROGRESS NOTES
Clinic Note [facility]     Assessment/Plan:  48 year old female    Tenosynovitis of left radial styloid -resolved status post 1 steroid injection  Left thumb CMC Arthritis-Informed patient that treatment options manage symptoms and she will still experience some pain.I discussed with patient  a detailed list of conservative treatment options such as supplements including curcumin and tumeric, as well as topical Voltaren, topical CBD oil, and bracing. W status post 1 corticosteroid injection with improvement but not full resolution of pain.  We will try a brace and see if that does not help her manage her pain better.  If her pain gets significant enough she will consider a CMC arthroplasty patient is a diabetic , last A1C reading was 6.  Patient is an insulin-dependent diabetic    Follow Up: As needed      Diagnostic Studies:     XR Left Thumb : No radiographically visible acute osseous abnormality of the left thumb.Primary osteoarthritic manifestations of the left 1st carpometacarpal articulation       Physical Exam:     Ht 5' 3\" (1.6 m)   Wt 250 lb (113.4 kg)   BMI 44.29 kg/m²     Constitutional: NAD. AOx3. Well-developed and Well-nourished.   Psychiatric: Normal mood/ affect/ behavior. Judgment and thought content normal.     Left Upper Extremity:     Inspection    Skin intact. No skin lesions. No obvious mass visualized.    Palpation    TTP over left thumb CMC joint.  No pain first dorsal compartment.      ROM    Full composite fist.     Neurovascular    Normal sensation in the median, ulnar, and radial nerve distribution. Normal motor function of muscles innervated by median/AIN, ulnar, and radial/PIN nerves.    Normally perfused hand(s).     Special    Pain with CMC seesaw test. (-) Finkelstein test.            CC: Left thumb pain    HPI: This 48 year old RHD female presents with left thumb pain for the past 1 year. Patient reports moderate pain. Aching, sharp and intermittent. Brace and NSAIDs have  be used without relief of symptoms.    Interval Hx (2024): Improvement in left thumb CMC joint pain.  Complete resolution of left de Quervain's tenosynovitis    Occupation: Speech Pathologist    History/Other:   Past Medical History:    Allergic rhinitis    Asthma    allergy induced    Carpal tunnel syndrome    Diabetes (HCC)    diabetes for     Eczema    2-3 years ago, only on right fingers    Essential hypertension    Normal BP readings at home    GDM (gestational diabetes mellitus) (HCC)    High blood pressure    High cholesterol    HORMONE DISORDER    PCOS    Infertility female    Obesity    Other and unspecified hyperlipidemia    PCOS (polycystic ovarian syndrome)    Seasonal allergies    Shoulder tendonitis    Type II or unspecified type diabetes mellitus without mention of complication, uncontrolled     Past Surgical History:   Procedure Laterality Date    Adenoidectomy  1980      ,    Carpal tunnel release Right     Cataract extraction w/  intraocular lens implant Right 2023    Dr. Ny @ [facility]    Colonoscopy N/A 02/15/2022    Procedure: COLONOSCOPY;  Surgeon: Bobby Ann MD;  Location: [facility] ENDOSCOPY    D & c  ?    Laparoscopic appendectomy  2015    Other surgical history      IVF    Reduction of large breast      Tonsillectomy       Current Outpatient Medications   Medication Sig Dispense Refill    tobramycin 0.3 % Ophthalmic Solution Place 1-2 drops into both eyes every 6 (six) hours for 5 days. 1 each 0    Ciclopirox 8 % External Solution Apply 1 mL topically nightly. 6.6 mL 1    cyclobenzaprine 5 MG Oral Tab Take 1 tablet (5 mg total) by mouth 3 (three) times daily as needed for Muscle spasms. 20 tablet 0    Continuous Glucose Sensor (DEXCOM G7 SENSOR) Does not apply Misc 1 each Every 10 days. 9 each 1    insulin regular human, conc, (HUMULIN R U-500 KWIKPEN) 500 UNIT/ML Subcutaneous Solution Pen-injector Inject 0.24 mL (120 Units total) into  the skin daily with breakfast AND 0.24 mL (120 Units total) daily with lunch AND 0.25 mL (125 Units total) daily with dinner. 24 mL 2    metFORMIN  MG Oral Tablet 24 Hr TAKE FOUR TABLETS BY MOUTH DAILY WITH BREAKFAST. 360 tablet 3    dapagliflozin (FARXIGA) 10 MG Oral Tab Take 1 tablet (10 mg total) by mouth daily. 90 tablet 1    lisinopril 5 MG Oral Tab Take 1 tablet (5 mg total) by mouth daily. 90 tablet 3    atorvastatin 40 MG Oral Tab TAKE 1 TABLET BY MOUTH EVERY DAY AT NIGHT 90 tablet 3    OZEMPIC, 1 MG/DOSE, 4 MG/3ML Subcutaneous Solution Pen-injector INJECT 1MG INTO THE SKIN ONCE A WEEK 9 mL 1    traZODone 50 MG Oral Tab Take 1 tablet (50 mg total) by mouth nightly as needed for Sleep. 90 tablet 2    fluocinonide 0.05 % External Cream Use twice daily on affected areas, itchy bumps on hands 60 g 3    Insulin Pen Needle 32G X 4 MM Does not apply Misc Inject 3x daily 300 each 0    meclizine 25 MG Oral Tab Take 1 tablet (25 mg total) by mouth 3 (three) times daily as needed for Dizziness. 30 tablet 0    levonorgestrel 20 MCG/24HR Intrauterine IUD Mirena 20 mcg/24 hr (5 years) intrauterine device   Take by intrauterine route.       Allergies   Allergen Reactions    Cat Dander [Dander]     Iodine (Topical)     Seasonal     Nickel RASH    No Known Allergies ITCHING     Family History   Problem Relation Age of Onset    Hypertension Father     Obesity Father     Cancer Paternal Grandfather         Esophageal cancer    Diabetes Paternal Grandmother     Stroke Paternal Grandmother     Cancer Son         Medulloblastoma    Glaucoma Neg     Macular degeneration Neg      Social History     Occupational History    Occupation: speech therapist   Tobacco Use    Smoking status: Never    Smokeless tobacco: Never   Vaping Use    Vaping status: Never Used   Substance and Sexual Activity    Alcohol use: Never    Drug use: Never    Sexual activity: Yes      Assessment       Review of Systems (negative unless  bolded):  General: fevers, chills, fatigue  CV:  chest pain, palpitations, leg swelling  Msk: bodyaches, neck pain, neck stiffness  Skin: rashes, open wounds, nonhealing ulcers  Hem: bleeds easily, bruise easily, immunocompromised  Neuro: dizziness, light headedness, headaches  Psych: anxious, depressed, anger issues    Attention: This note has been scribed by Gloria Godoy under the supervision of Cheikh Velasco MD.     Cheikh Velasco MD  Hand, Wrist, & Elbow Surgery  [facility] [place] [facility]  [address] [place] [address], [address] [place] [email or web address]  [email or web address]  t: [phone]  f: [phone]

## 2024-06-12 ENCOUNTER — OFFICE VISIT (OUTPATIENT)
Dept: INTERNAL MEDICINE CLINIC | Facility: CLINIC | Age: 49
End: 2024-06-12
Payer: COMMERCIAL

## 2024-06-12 ENCOUNTER — NURSE TRIAGE (OUTPATIENT)
Dept: FAMILY MEDICINE CLINIC | Facility: CLINIC | Age: 49
End: 2024-06-12

## 2024-06-12 VITALS
DIASTOLIC BLOOD PRESSURE: 82 MMHG | SYSTOLIC BLOOD PRESSURE: 129 MMHG | HEART RATE: 94 BPM | WEIGHT: 247.81 LBS | HEIGHT: 63 IN | TEMPERATURE: 98 F | BODY MASS INDEX: 43.91 KG/M2

## 2024-06-12 DIAGNOSIS — J06.9 ACUTE URI: Primary | ICD-10-CM

## 2024-06-12 LAB
CONTROL LINE PRESENT WITH A CLEAR BACKGROUND (YES/NO): YES YES/NO
KIT LOT #: NORMAL NUMERIC
STREP GRP A CUL-SCR: NEGATIVE

## 2024-06-12 PROCEDURE — 87880 STREP A ASSAY W/OPTIC: CPT | Performed by: INTERNAL MEDICINE

## 2024-06-12 PROCEDURE — 3079F DIAST BP 80-89 MM HG: CPT | Performed by: INTERNAL MEDICINE

## 2024-06-12 PROCEDURE — 3074F SYST BP LT 130 MM HG: CPT | Performed by: INTERNAL MEDICINE

## 2024-06-12 PROCEDURE — 3008F BODY MASS INDEX DOCD: CPT | Performed by: INTERNAL MEDICINE

## 2024-06-12 PROCEDURE — 99213 OFFICE O/P EST LOW 20 MIN: CPT | Performed by: INTERNAL MEDICINE

## 2024-06-12 NOTE — PROGRESS NOTES
Kay Tracy is a 48 year old female.   Chief Complaint   Patient presents with    Sore Throat     HPI:   For the past 3 days she has had a persistent sore throat, worse when swallowing.  She also has been experiencing nasal congestion with yellow drainage, postnasal drip, left ear discomfort and coughing.  No fever.  No sinus pressure or pain.  No shortness of breath.  No recent Strep exposure.  A home COVID test has not been done.    History of type 2 diabetes, hypertension and dyslipidemia.  Medications reviewed, as listed below.  No medication allergies.  Current Outpatient Medications   Medication Sig Dispense Refill    Ciclopirox 8 % External Solution Apply 1 mL topically nightly. 6.6 mL 1    cyclobenzaprine 5 MG Oral Tab Take 1 tablet (5 mg total) by mouth 3 (three) times daily as needed for Muscle spasms. 20 tablet 0    Continuous Glucose Sensor (DEXCOM G7 SENSOR) Does not apply Misc 1 each Every 10 days. 9 each 1    insulin regular human, conc, (HUMULIN R U-500 KWIKPEN) 500 UNIT/ML Subcutaneous Solution Pen-injector Inject 0.24 mL (120 Units total) into the skin daily with breakfast AND 0.24 mL (120 Units total) daily with lunch AND 0.25 mL (125 Units total) daily with dinner. 24 mL 2    metFORMIN  MG Oral Tablet 24 Hr TAKE FOUR TABLETS BY MOUTH DAILY WITH BREAKFAST. 360 tablet 3    dapagliflozin (FARXIGA) 10 MG Oral Tab Take 1 tablet (10 mg total) by mouth daily. 90 tablet 1    lisinopril 5 MG Oral Tab Take 1 tablet (5 mg total) by mouth daily. 90 tablet 3    atorvastatin 40 MG Oral Tab TAKE 1 TABLET BY MOUTH EVERY DAY AT NIGHT 90 tablet 3    OZEMPIC, 1 MG/DOSE, 4 MG/3ML Subcutaneous Solution Pen-injector INJECT 1MG INTO THE SKIN ONCE A WEEK 9 mL 1    traZODone 50 MG Oral Tab Take 1 tablet (50 mg total) by mouth nightly as needed for Sleep. 90 tablet 2    fluocinonide 0.05 % External Cream Use twice daily on affected areas, itchy bumps on hands 60 g 3    Insulin Pen Needle 32G X 4 MM Does not  apply Misc Inject 3x daily 300 each 0    meclizine 25 MG Oral Tab Take 1 tablet (25 mg total) by mouth 3 (three) times daily as needed for Dizziness. 30 tablet 0    levonorgestrel 20 MCG/24HR Intrauterine IUD Mirena 20 mcg/24 hr (5 years) intrauterine device   Take by intrauterine route.       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    Obesity    Other and unspecified hyperlipidemia    PCOS (polycystic ovarian syndrome)    Seasonal allergies    Shoulder tendonitis    Type II or unspecified type diabetes mellitus without mention of complication, uncontrolled      Social History:  Social History     Socioeconomic History    Marital status:     Number of children: 1   Occupational History    Occupation: speech therapist   Tobacco Use    Smoking status: Never    Smokeless tobacco: Never   Vaping Use    Vaping status: Never Used   Substance and Sexual Activity    Alcohol use: Never    Drug use: Never    Sexual activity: Yes   Other Topics Concern    Caffeine Concern Yes     Comment: soda-1 cup/day    Reaction to local anesthetic No    Pt has a pacemaker No    Pt has a defibrillator No        EXAM:   GENERAL: Pleasant female appearing well and breathing comfortably in no distress  /82 (BP Location: Right arm, Patient Position: Sitting, Cuff Size: adult)   Pulse 94   Temp 98.3 °F (36.8 °C) (Temporal)   Ht 5' 3\" (1.6 m)   Wt 247 lb 12.8 oz (112.4 kg)   BMI 43.90 kg/m²   HEENT: Anicteric, conjunctiva pink, canals and TMs normal  bilaterally, no maxillary or frontal sinus tenderness, oropharynx with mild posterior erythema without ulceration swelling or exudate  NECK: Supple without mass or lymphadenopathy  LUNGS: Resonant to percussion and clear to auscultation without crackles or wheezes    Rapid Strep negative    ASSESSMENT AND PLAN:   1. Acute URI  - POC Rapid Strep [65109]    Likely viral.  Symptomatic treatment- Tylenol, pseudoephedrine, saline gargles, Robitussin as needed  Call if not soon better.     The patient indicates understanding of these issues and agrees to the plan.    Ced Owens MD  6/12/2024  2:44 PM

## 2024-06-12 NOTE — TELEPHONE ENCOUNTER
Action Requested: Summary for Provider     []  Critical Lab, Recommendations Needed  [] Need Additional Advice  []   FYI    []   Need Orders  [] Need Medications Sent to Pharmacy  []  Other     SUMMARY: patient calling to schedule an appointment. Scheduled patient to be seen today. Patient has been experiencing a Sore throat to left side when swallows, nasal congestion, post nasal drip, left ear ache, dry/irritated cough  States did have pink eye to both eye 1.5 weeks ago    Reason for call: Sore Throat (/)  Onset: Monday    Denies chest congestion, difficulty breathing, SOB, wheezing, headaches, neck pain/stiffness    Has been taking/doing over the counter:  Throat drops and Tylenol with relief    Discussed Home Care Advice with patient. Patient verbalizes understanding and agrees to the plan of care.    Reason for Disposition   Patient wants to be seen    Protocols used: Sore Throat-A-[place]

## 2024-06-19 ENCOUNTER — PATIENT MESSAGE (OUTPATIENT)
Dept: FAMILY MEDICINE CLINIC | Facility: CLINIC | Age: 49
End: 2024-06-19

## 2024-06-19 NOTE — TELEPHONE ENCOUNTER
From: Kay Tracy  To: Jessica Darline Marvin  Sent: 6/19/2024 4:58 PM CDT  Subject: Frequent stomach aches/sometimes vomiting    Hello - I’m pretty sure I had food poisoning the first weekend in May (threw up about 8 times that day) with a stomach ache that lasted another day or two. Since then (about 6 weeks now) I have had stomach aches about different 10 times, sometimes during the day and once or twice woke me up. They aren’t sharp, but seem to feel it lower down and often more on the right side. I have had vomiting with 5-6 of these episodes, the most recent one being today around 2:00. I threw up twice today. I do not usually feel better after I throw up, and today the right side lower pain is hanging around. I did have my appendix out about 10 years ago. I have tried Tums and Pepto, it sure what else to do? I tried calling the office but it was already closed. I will try again tomorrow but thought it couldn’t hurt to message.

## 2024-06-25 ENCOUNTER — PATIENT MESSAGE (OUTPATIENT)
Dept: FAMILY MEDICINE CLINIC | Facility: CLINIC | Age: 49
End: 2024-06-25

## 2024-06-26 NOTE — TELEPHONE ENCOUNTER
From: Kay Tracy  To: Jessica Darline Marvin  Sent: 6/25/2024 6:42 PM CDT  Subject: Throat still hurting    Hello - I saw one of the docs on the 12th and strep test was negative. I’m still having pain when I swallow that has not improved. Before it seemed more on the left side, now it feels like the pain is mostly on the right side. Still no fever or anything else. Any other suggestions? Thank you!

## 2024-07-10 ENCOUNTER — NURSE TRIAGE (OUTPATIENT)
Dept: FAMILY MEDICINE CLINIC | Facility: CLINIC | Age: 49
End: 2024-07-10

## 2024-07-10 ENCOUNTER — OFFICE VISIT (OUTPATIENT)
Dept: FAMILY MEDICINE CLINIC | Facility: CLINIC | Age: 49
End: 2024-07-10

## 2024-07-10 ENCOUNTER — HOSPITAL ENCOUNTER (OUTPATIENT)
Dept: GENERAL RADIOLOGY | Age: 49
Discharge: HOME | End: 2024-07-10
Attending: PHYSICIAN ASSISTANT
Payer: COMMERCIAL

## 2024-07-10 VITALS
WEIGHT: 245 LBS | SYSTOLIC BLOOD PRESSURE: 125 MMHG | HEIGHT: 63 IN | HEART RATE: 90 BPM | DIASTOLIC BLOOD PRESSURE: 86 MMHG | BODY MASS INDEX: 43.41 KG/M2

## 2024-07-10 DIAGNOSIS — R31.9 HEMATURIA, UNSPECIFIED TYPE: ICD-10-CM

## 2024-07-10 DIAGNOSIS — R10.2 PAIN IN PELVIS: Primary | ICD-10-CM

## 2024-07-10 LAB
APPEARANCE: CLEAR
BILIRUBIN: NEGATIVE
GLUCOSE (URINE DIPSTICK): 500 MG/DL
KETONES (URINE DIPSTICK): NEGATIVE MG/DL
LEUKOCYTES: NEGATIVE
MULTISTIX LOT#: ABNORMAL NUMERIC
NITRITE, URINE: NEGATIVE
PH, URINE: 6 (ref 4.5–8)
SPECIFIC GRAVITY: 1.02 (ref 1–1.03)
UROBILINOGEN,SEMI-QN: 0.2 MG/DL (ref 0–1.9)

## 2024-07-10 PROCEDURE — 74018 RADEX ABDOMEN 1 VIEW: CPT | Performed by: PHYSICIAN ASSISTANT

## 2024-07-10 PROCEDURE — 81002 URINALYSIS NONAUTO W/O SCOPE: CPT | Performed by: PHYSICIAN ASSISTANT

## 2024-07-10 PROCEDURE — 3008F BODY MASS INDEX DOCD: CPT | Performed by: PHYSICIAN ASSISTANT

## 2024-07-10 PROCEDURE — 99213 OFFICE O/P EST LOW 20 MIN: CPT | Performed by: PHYSICIAN ASSISTANT

## 2024-07-10 PROCEDURE — 3074F SYST BP LT 130 MM HG: CPT | Performed by: PHYSICIAN ASSISTANT

## 2024-07-10 PROCEDURE — 3079F DIAST BP 80-89 MM HG: CPT | Performed by: PHYSICIAN ASSISTANT

## 2024-07-10 NOTE — TELEPHONE ENCOUNTER
Patient called office as advised by Posse. Patient's date of birth and full name both confirmed.        Action Requested: Summary for Provider     []  Critical Lab, Recommendations Needed  [] Need Additional Advice  [x]   FYI    []   Need Orders  [] Need Medications Sent to Pharmacy  []  Other     SUMMARY: Visit today with Nikolas Fraser PA-C.     Reason for call: Mild Abd Pain , sometimes with vomiting.      Onset: 1 month or so. See [facility]se Message patient sent on 6/19/24.     Triaged and advised care advice   Denies severe abdominal pain.   No fever.   Emesis x 1 in the past 24 hours.   Denies shortness of breath, difficulty breathing, chest pain, chest pressure.     Evaluation advised today  Appointment made.     Advised to monitor symptoms.  RN advised if symptoms get severely worse, patient should seek care at Emergency Room or Immediate Care.  RN also informed patient to seek immediate medical attention at ER if patient experiences severe/worsening symptoms, shortness of breath, chest pain, or severe pain.  Patient verbalizes understanding and is agreeable to instructions.     Future Appointments   Date Time Provider Department Center   7/10/2024  2:15 PM Radha Fraser PA-C [facility] [place]   7/11/2024  9:30 AM Sorin Persaud APRN [facility]         Reason for Disposition   Patient wants to be seen    Protocols used: Abdominal Pain - Female-A-[place]

## 2024-07-10 NOTE — PROGRESS NOTES
HPI:     HPI  A 48-year-old female is in the office complaining of lower abdominal pain with vomiting sometimes for the past month. The patient had a new IUD insertion in February 2024. The patient is doing fine at this time.  The patient denies chest pain, SOB, N/V/C/D, fever, and dizziness. There are no other concerns today.      Current Outpatient Medications   Medication Sig Dispense Refill    Ciclopirox 8 % External Solution Apply 1 mL topically nightly. 6.6 mL 1    cyclobenzaprine 5 MG Oral Tab Take 1 tablet (5 mg total) by mouth 3 (three) times daily as needed for Muscle spasms. 20 tablet 0    Continuous Glucose Sensor (DEXCOM G7 SENSOR) Does not apply Misc 1 each Every 10 days. 9 each 1    insulin regular human, conc, (HUMULIN R U-500 KWIKPEN) 500 UNIT/ML Subcutaneous Solution Pen-injector Inject 0.24 mL (120 Units total) into the skin daily with breakfast AND 0.24 mL (120 Units total) daily with lunch AND 0.25 mL (125 Units total) daily with dinner. 24 mL 2    metFORMIN  MG Oral Tablet 24 Hr TAKE FOUR TABLETS BY MOUTH DAILY WITH BREAKFAST. 360 tablet 3    dapagliflozin (FARXIGA) 10 MG Oral Tab Take 1 tablet (10 mg total) by mouth daily. 90 tablet 1    lisinopril 5 MG Oral Tab Take 1 tablet (5 mg total) by mouth daily. 90 tablet 3    atorvastatin 40 MG Oral Tab TAKE 1 TABLET BY MOUTH EVERY DAY AT NIGHT 90 tablet 3    OZEMPIC, 1 MG/DOSE, 4 MG/3ML Subcutaneous Solution Pen-injector INJECT 1MG INTO THE SKIN ONCE A WEEK 9 mL 1    traZODone 50 MG Oral Tab Take 1 tablet (50 mg total) by mouth nightly as needed for Sleep. 90 tablet 2    fluocinonide 0.05 % External Cream Use twice daily on affected areas, itchy bumps on hands 60 g 3    Insulin Pen Needle 32G X 4 MM Does not apply Misc Inject 3x daily 300 each 0    meclizine 25 MG Oral Tab Take 1 tablet (25 mg total) by mouth 3 (three) times daily as needed for Dizziness. 30 tablet 0    levonorgestrel 20 MCG/24HR Intrauterine IUD Mirena 20 mcg/24 hr (5 years)  intrauterine device   Take by intrauterine route.         Allergies:     Allergies   Allergen Reactions    Cat Dander [Dander]     Iodine (Topical)     Seasonal     Nickel RASH    No Known Allergies ITCHING       History:     Health Maintenance   Topic Date Due    Pneumococcal Vaccine: Birth to 64yrs (2 of 2 - PCV) 2020    Annual Depression Screening  2024    Diabetes Care A1C  2024    Diabetes Care Dilated Eye Exam  2024    DTaP,Tdap,and Td Vaccines (2 - Td or Tdap) 06/10/2024    Diabetes Care Foot Exam  2024    Pap Smear  2024    Influenza Vaccine (1) 10/01/2024    Mammogram  2024    Annual Physical  2024    Diabetes Care: GFR  2024    Diabetes Care: Microalb/Creat Ratio  2024    Colorectal Cancer Screening  02/15/2025    COVID-19 Vaccine  Completed       No LMP recorded. (Menstrual status: IUD - Intrauterine Device).   Past Medical History:     Past Medical History:    Allergic rhinitis    Asthma    allergy induced    Carpal tunnel syndrome    Diabetes (HCC)    diabetes for 2011    Eczema    2-3 years ago, only on right fingers    Essential hypertension    Normal BP readings at home    GDM (gestational diabetes mellitus) (HCC)    High blood pressure    High cholesterol    HORMONE DISORDER    PCOS    Infertility female    Obesity    Other and unspecified hyperlipidemia    PCOS (polycystic ovarian syndrome)    Seasonal allergies    Shoulder tendonitis    Type II or unspecified type diabetes mellitus without mention of complication, uncontrolled       Past Surgical History:     Past Surgical History:   Procedure Laterality Date    Adenoidectomy        ,    Carpal tunnel release Right     Cataract extraction w/  intraocular lens implant Right 2023    Dr. Ny @ [facility]    Colonoscopy N/A 02/15/2022    Procedure: COLONOSCOPY;  Surgeon: Bobby Ann MD;  Location: [facility] ENDOSCOPY    D & c  ?    Laparoscopic appendectomy   02/27/2015    Other surgical history      IVF    Reduction of large breast  1998    Tonsillectomy  1980       Family History:     Family History   Problem Relation Age of Onset    Hypertension Father     Obesity Father     Cancer Paternal Grandfather         Esophageal cancer    Diabetes Paternal Grandmother     Stroke Paternal Grandmother     Cancer Son         Medulloblastoma    Glaucoma Neg     Macular degeneration Neg        Social History:     Social History     Socioeconomic History    Marital status:      Spouse name: Not on file    Number of children: 1    Years of education: Not on file    Highest education level: Not on file   Occupational History    Occupation: speech therapist   Tobacco Use    Smoking status: Never    Smokeless tobacco: Never   Vaping Use    Vaping status: Never Used   Substance and Sexual Activity    Alcohol use: Never    Drug use: Never    Sexual activity: Yes   Other Topics Concern     Service Not Asked    Blood Transfusions Not Asked    Caffeine Concern Yes     Comment: soda-1 cup/day    Occupational Exposure Not Asked    Hobby Hazards Not Asked    Sleep Concern Not Asked    Stress Concern Not Asked    Weight Concern Not Asked    Special Diet Not Asked    Back Care Not Asked    Exercise Not Asked    Bike Helmet Not Asked    Seat Belt Not Asked    Self-Exams Not Asked    Grew up on a farm Not Asked    History of tanning Not Asked    Outdoor occupation Not Asked    Breast feeding Not Asked    Reaction to local anesthetic No    Pt has a pacemaker No    Pt has a defibrillator No   Social History Narrative    Not on file     Social Determinants of Health     Financial Resource Strain: Not on file   Food Insecurity: Not on file   Transportation Needs: Not on file   Physical Activity: Not on file   Stress: Not on file   Social Connections: Not on file   Housing Stability: Not on file       Review of Systems:   Review of Systems   Gastrointestinal:  Positive for abdominal  pain.        Vitals:    07/10/24 1417   BP: 125/86   Pulse: 90   Weight: 245 lb (111.1 kg)   Height: 5' 3\" (1.6 m)     Body mass index is 43.4 kg/m².    Physical Exam:   Physical Exam  Vitals reviewed.   Constitutional:       General: She is not in acute distress.     Appearance: She is well-developed.   HENT:      Head: Normocephalic and atraumatic.      Right Ear: External ear normal.      Left Ear: External ear normal.      Nose: Nose normal.   Eyes:      General:         Right eye: No discharge.         Left eye: No discharge.      Conjunctiva/sclera: Conjunctivae normal.   Cardiovascular:      Rate and Rhythm: Normal rate and regular rhythm.      Heart sounds: Normal heart sounds, S1 normal and S2 normal. No murmur heard.  Pulmonary:      Effort: Pulmonary effort is normal.      Breath sounds: Normal breath sounds. No wheezing or rales.   Chest:      Chest wall: No tenderness.   Abdominal:      General: Abdomen is flat. Bowel sounds are normal. There is no distension.      Palpations: Abdomen is soft.      Tenderness: There is no abdominal tenderness. There is no right CVA tenderness or left CVA tenderness.   Lymphadenopathy:      Cervical: No cervical adenopathy.   Skin:     Findings: No rash.   Neurological:      Mental Status: She is alert and oriented to person, place, and time.   Psychiatric:         Behavior: Behavior is cooperative.          Assessment and Plan::     Problem List Items Addressed This Visit    None  Visit Diagnoses       Pain in pelvis    -  Primary    Relevant Orders    POC Urinalysis, Manual Dip without microscopy [02560] (Completed)    Hematuria, unspecified type        Relevant Orders    XR ABDOMEN (1 VIEW) (CPT=74018)        Advise the patient to follow up with Gyne if the pain persists. The pain is possible from the IUD.    Discussed plan of care with pt and pt is in agreement.All questions answered. Pt to call with questions or concerns.

## 2024-07-11 ENCOUNTER — OFFICE VISIT (OUTPATIENT)
Dept: ENDOCRINOLOGY CLINIC | Facility: CLINIC | Age: 49
End: 2024-07-11
Payer: COMMERCIAL

## 2024-07-11 VITALS
BODY MASS INDEX: 43.41 KG/M2 | HEART RATE: 90 BPM | SYSTOLIC BLOOD PRESSURE: 98 MMHG | WEIGHT: 245 LBS | HEIGHT: 63 IN | DIASTOLIC BLOOD PRESSURE: 67 MMHG

## 2024-07-11 DIAGNOSIS — E11.3299 TYPE 2 DIABETES MELLITUS WITH MILD NONPROLIFERATIVE RETINOPATHY WITHOUT MACULAR EDEMA, WITH LONG-TERM CURRENT USE OF INSULIN, UNSPECIFIED LATERALITY (HCC): Primary | ICD-10-CM

## 2024-07-11 DIAGNOSIS — Z79.4 TYPE 2 DIABETES MELLITUS WITH MILD NONPROLIFERATIVE RETINOPATHY WITHOUT MACULAR EDEMA, WITH LONG-TERM CURRENT USE OF INSULIN, UNSPECIFIED LATERALITY (HCC): Primary | ICD-10-CM

## 2024-07-11 LAB
GLUCOSE BLOOD: 170
HEMOGLOBIN A1C: 6.3 % (ref 4.3–5.6)
TEST STRIP LOT #: NORMAL NUMERIC

## 2024-07-11 PROCEDURE — 83036 HEMOGLOBIN GLYCOSYLATED A1C: CPT | Performed by: NURSE PRACTITIONER

## 2024-07-11 PROCEDURE — 3078F DIAST BP <80 MM HG: CPT | Performed by: NURSE PRACTITIONER

## 2024-07-11 PROCEDURE — 82947 ASSAY GLUCOSE BLOOD QUANT: CPT | Performed by: NURSE PRACTITIONER

## 2024-07-11 PROCEDURE — 3008F BODY MASS INDEX DOCD: CPT | Performed by: NURSE PRACTITIONER

## 2024-07-11 PROCEDURE — 3074F SYST BP LT 130 MM HG: CPT | Performed by: NURSE PRACTITIONER

## 2024-07-11 PROCEDURE — 99214 OFFICE O/P EST MOD 30 MIN: CPT | Performed by: NURSE PRACTITIONER

## 2024-07-11 PROCEDURE — 3044F HG A1C LEVEL LT 7.0%: CPT | Performed by: NURSE PRACTITIONER

## 2024-07-11 NOTE — PATIENT INSTRUCTIONS
A1C: 6.3% today --> previously was from 6.1% on 10/17/2023  Blood glucose: 170 in clinic today  Endocrinology fax# [phone]    Medications:   -continue with Metformin XR  2,000mg daily with breakfast   -decrease U500   120 units Breakfast  120 units with lunch  120--> 115 units dinner   -continue with Ozempic 1mg once weekly   -continue with Farxiga 10mg once daily   - start probiotic - take daily      if stomach related symptoms do not improve in the next 2 weeks after starting daily probiotic, hold ozempic for 2 weeks and update me of your symptoms     Weight:  Wt Readings from Last 6 Encounters:   07/11/24 245 lb (111.1 kg)   07/10/24 245 lb (111.1 kg)   06/12/24 247 lb 12.8 oz (112.4 kg)   06/05/24 250 lb (113.4 kg)   05/03/24 250 lb (113.4 kg)   02/12/24 249 lb 3.2 oz (113 kg)     A1C goal:  <7.0%    Blood sugar testing:  Test your blood sugar 3 times daily   Recommended times to test: Before breakfast (fasting), before lunch and before dinner   Or   Start continuous glucometer     Blood sugar targets:  Before breakfast:  (preferably < 110)  Before meals OR 2 hours after meals: <180 (preferably <150)     Call for persistent blood sugars < 75 or > 200

## 2024-07-11 NOTE — PROGRESS NOTES
Name: Kay Tracy  Date: 2024    CHIEF COMPLAINT   Chief Complaint   Patient presents with    Diabetes     F/u for diabetes and A1c check     HISTORY OF PRESENT ILLNESS   Kay Tracy is a 48 year old female who presents for follow up on diabetes management.  HbA1C: 6.3% at POC today. Previously was 6.1% on 10/17/2023.   Blood glucose is: 170 at POC today.   Patient notes that she has been having nausea/vomiting episodes that occur intermittently for the past 2 months. She has had x-ray completed without any abnormal findings.   She continues to stay compliant with following a low carb diet and avoiding acidic/spicy foods. Has been compliant with taking all her diabetes medications.   In the past several weeks she has not been using Dexcom CGM due to adhesive not staying on for the full duration.     DIABETES HISTORY  Diagnosed: PCOS at age 21; gestational diabetes at age 33  And was diagnosed with DM shortly after in .   Prior HbA, C or glycohemoglobin were 9.2% 2018; 8.6% 2018; 9.1% 3/2019; 8.4% 2019; 7.2% 2020; 7.3% 10/2020; 6.5% 21; 6.4% `2022; 5.8% 2022; 6.3% 2022; 7.0% 2023; 6.1% 10/17/2023; 6.3% at POC today;     Patient has not had hospitalizations for blood sugar issues and denies any history of pancreatitis    CURRENT MEDICATIONS FOR DM:  -Metformin ER  2,000mg daily with breakfast   -Ozempic 1mg once weekly -tolerating well; denies Gi  -Farxiga 10mg once daily in AM  -U500 insulin: 120 units Breakfast    120 unit with lunch     120 unit dinner     HOME GLUCOSE READINGS:   Has not been checking BG readings frequently   Testing 2-3x daily   Fastins- 130s   After meals: 150s   Hypoglycemia: yes - mostly occurring overnight; rarely mid day; last time was around 2 months ago;   Hypoglycemia awareness: yes     HISTORY OF DIABETES COMPLICATIONS:  History of Retinopathy: no - last eye exam within the past 12 months: yes   History of Neuropathy: no   History  of Nephropathy: no     ASSOCIATED COMPLICATIONS:   HTN: no   Hyperlipidemia: yes  Coronary Artery Disease:  No   Cerebrovascular Disease: no   Peripheral Vascular Disease: no     DIETARY COMPLIANCE:  Diet recall:   - Salads/veggies; chicken/fish; beans/soy based   - avoids greasy foods and spicy foods   - diet soda on occasion   - mostly water; crystal light or diet soda   - no yogurts recently; does not eat sugar substitute     EXERCISE:   Yes - Walking dog daily around 1.5 miles   - Bike riding around 2x weekly     Polyuria, polyphagia, polydipsia: no  Paresthesias: no  Blurred vision: no  Recent steroids, illness or infections: no     REVIEW OF SYSTEMS  Constitutional: Negative for: weight change, fever, fatigue, cold/heat intolerance  Eyes: Negative for:  Visual changes, proptosis, blurring  Respiratory: Negative for: hemoptysis, shortness of breath, cough, or dyspnea.  Cardiovascular: Negative for:  chest pain, chest discomfort, palpitations  GI: Negative for:  abdominal pain, nausea, vomiting, diarrhea, heartburn, constipation  Neurology: Negative for: headache, dizziness, syncope, numbness/tingling, or weakness.   Genito-Urinary: Negative for: dysuria, frequency or hematuria   Hematology/Lymphatics: Negative for: bruising, easy bleeding, lower extremity edema  Endocrine: Negative for: polyuria, polydipsia. No thyroid disease. No osteoporosis.   Skin: Negative for: rash, blister, infection or ulcers.    MEDICATIONS:     Current Outpatient Medications:     insulin regular human, conc, (HUMULIN R U-500 KWIKPEN) 500 UNIT/ML Subcutaneous Solution Pen-injector, Inject 0.24 mL (120 Units total) into the skin daily with breakfast AND 0.24 mL (120 Units total) daily with lunch AND 0.25 mL (125 Units total) daily with dinner., Disp: 24 mL, Rfl: 2    metFORMIN  MG Oral Tablet 24 Hr, TAKE FOUR TABLETS BY MOUTH DAILY WITH BREAKFAST., Disp: 360 tablet, Rfl: 3    dapagliflozin (FARXIGA) 10 MG Oral Tab, Take 1 tablet  (10 mg total) by mouth daily., Disp: 90 tablet, Rfl: 1    OZEMPIC, 1 MG/DOSE, 4 MG/3ML Subcutaneous Solution Pen-injector, INJECT 1MG INTO THE SKIN ONCE A WEEK, Disp: 9 mL, Rfl: 1    Ciclopirox 8 % External Solution, Apply 1 mL topically nightly., Disp: 6.6 mL, Rfl: 1    cyclobenzaprine 5 MG Oral Tab, Take 1 tablet (5 mg total) by mouth 3 (three) times daily as needed for Muscle spasms., Disp: 20 tablet, Rfl: 0    Continuous Glucose Sensor (DEXCOM G7 SENSOR) Does not apply Misc, 1 each Every 10 days., Disp: 9 each, Rfl: 1    lisinopril 5 MG Oral Tab, Take 1 tablet (5 mg total) by mouth daily., Disp: 90 tablet, Rfl: 3    atorvastatin 40 MG Oral Tab, TAKE 1 TABLET BY MOUTH EVERY DAY AT NIGHT, Disp: 90 tablet, Rfl: 3    traZODone 50 MG Oral Tab, Take 1 tablet (50 mg total) by mouth nightly as needed for Sleep., Disp: 90 tablet, Rfl: 2    fluocinonide 0.05 % External Cream, Use twice daily on affected areas, itchy bumps on hands, Disp: 60 g, Rfl: 3    Insulin Pen Needle 32G X 4 MM Does not apply Misc, Inject 3x daily, Disp: 300 each, Rfl: 0    meclizine 25 MG Oral Tab, Take 1 tablet (25 mg total) by mouth 3 (three) times daily as needed for Dizziness., Disp: 30 tablet, Rfl: 0    levonorgestrel 20 MCG/24HR Intrauterine IUD, Mirena 20 mcg/24 hr (5 years) intrauterine device  Take by intrauterine route., Disp: , Rfl:     ALLERGIES:   Allergies   Allergen Reactions    Cat Dander [Dander]     Iodine (Topical)     Seasonal     Nickel RASH    No Known Allergies ITCHING       SOCIAL HISTORY:   Social History     Socioeconomic History    Marital status:     Number of children: 1   Occupational History    Occupation: speech therapist   Tobacco Use    Smoking status: Never    Smokeless tobacco: Never   Vaping Use    Vaping status: Never Used   Substance and Sexual Activity    Alcohol use: Never    Drug use: Never    Sexual activity: Yes   Other Topics Concern    Caffeine Concern Yes     Comment: soda-1 cup/day    Reaction  to local anesthetic No    Pt has a pacemaker No    Pt has a defibrillator No     PAST MEDICAL HISTORY:   Past Medical History:    Allergic rhinitis    Asthma    allergy induced    Carpal tunnel syndrome    Diabetes (HCC)    diabetes for 2011    Eczema    2-3 years ago, only on right fingers    Essential hypertension    Normal BP readings at home    GDM (gestational diabetes mellitus) (HCC)    High blood pressure    High cholesterol    HORMONE DISORDER    PCOS    Infertility female    Obesity    Other and unspecified hyperlipidemia    PCOS (polycystic ovarian syndrome)    Seasonal allergies    Shoulder tendonitis    Type II or unspecified type diabetes mellitus without mention of complication, uncontrolled     PAST SURGICAL HISTORY:   Past Surgical History:   Procedure Laterality Date    Adenoidectomy  1980      ,    Carpal tunnel release Right     Cataract extraction w/  intraocular lens implant Right 2023    Dr. Ny @ [facility]    Colonoscopy N/A 02/15/2022    Procedure: COLONOSCOPY;  Surgeon: Bobby Ann MD;  Location: [facility] ENDOSCOPY    D & c  ?    Laparoscopic appendectomy  2015    Other surgical history      IVF    Reduction of large breast  1998    Tonsillectomy       PHYSICAL EXAM:   Vitals:    24 0921   BP: 98/67   Pulse: 90   Weight: 245 lb (111.1 kg)   Height: 5' 3\" (1.6 m)       BMI:   Body mass index is 43.4 kg/m².    General Appearance:  alert, well developed, in no acute distress  Nutritional:  no extreme weight gain or loss  Head: Atraumatic  Eyes:  normal conjunctivae, sclera., normal sclera and normal pupils  Throat/Neck: normal sound to voice. Normal hearing, normal speech  Back: no kyphosis  Respiratory:  Speaking in full sentences, non-labored. no increased work of breathing, no audible wheezing    Skin:  normal moisture and skin texture, no visible lesions  Hair and nails: normal scalp hair  Hematologic:  no excessive bruising  Neuro: motor  grossly intact, moving all extremities without difficulty  Psychiatric:  oriented to time, self, and place  Extremities: no obvious extremity swelling, no lesions    Diabetes foot exam:  Bilateral barefoot skin diabetic exam is normal, visualized feet and the appearance is normal.  Bilateral monofilament/sensation of both feet is normal.  Pulsation pedal pulse exam of both lower legs/feet is normal as well.    LABS: pertinent labs reviewed    ASSESSMENT/PLAN:    -Reviewed with patient the pathogenesis of diabetes, clinical significance of A1c, and common complications such as: microvascular, macrovascular and diabetic ketoacidosis. Patient verbalizes understanding of the importance of glycemic control and the goals of therapy.   -Discussed with patient glucose targets ranges (Fasting  and post prandial <180).   1.Type 2 Diabetes Mellitus, uncontrolled with hypoglycemia with long-term insulin   LAB DATA  HbA,C: 6.3% today   a) Medications  -continue with Metformin XR  2,000mg daily with breakfast   -decrease U500   120 units Breakfast  120 units with lunch  120--> 115 units dinner   -continue with Ozempic 1mg once weekly   -continue with Farxiga 10mg once daily   - start taking a daily probiotic.     -discussed that if her stomach related symptoms do not improve within the next 2 weeks of taking daily probiotic, to proceed with holding Ozempic for 2 weeks, then update me on her symptoms. Will consider GI referral and/or changing glp-1 brand.   -reviewed adhesive barriers in helping cgm sensors stay on skin for the full duration. Samples given in clinic to skin tac.   - patient was also given sample for freestyle nena 3 to try if adhesive is not sticky enough with dexcom after using skin tac.   -discussed to continue to follow a low carb diet    -reviewed target goal BG readings and A1C  -reviewed when to call clinic with abnormal BG readings  -Discussed importance of hydration. Patient advised to drink 1-2  glasses additional of water than usually to avoid dehydration while on Farxiga.       b) no nephropathy:  on 2023 and urine MA: 8.3 on 2022  c) UTD with optho- patient will work on getting report faxed to our office   d) Foot exam normal today 2024  e) continue testing BG 3x daily- before each meal and on occasion test between meals or start using CGM.   f) Life style changes reviewed     2. Blood Pressure Management   -on lisinopril - verbalizes compliance and denies s/e.   -normotensive     3. Dyslipidemia   -LDL: 64 and Tri on 2023  -c/w atorvastatin 40mg once daily -verbalizes compliance and denies s/e      RTC in 4 months   Patient instructed to call or send [facility] msg sooner if she develops blood sugar readings <70 or has readings persistently >250.     The risks and benefits of my recommendations were discussed with the patient today. Questions were also answered to the best of my knowledge. Patient verbalizes understanding of these issues and agrees to the plan.    2024  JUVENCIO Moses

## 2024-07-16 ENCOUNTER — APPOINTMENT (OUTPATIENT)
Dept: CT IMAGING | Facility: HOSPITAL | Age: 49
End: 2024-07-16
Attending: EMERGENCY MEDICINE
Payer: COMMERCIAL

## 2024-07-16 ENCOUNTER — HOSPITAL ENCOUNTER (EMERGENCY)
Facility: HOSPITAL | Age: 49
Discharge: HOME | End: 2024-07-16
Attending: EMERGENCY MEDICINE
Payer: COMMERCIAL

## 2024-07-16 ENCOUNTER — OFFICE VISIT (OUTPATIENT)
Dept: SURGERY | Facility: CLINIC | Age: 49
End: 2024-07-16

## 2024-07-16 ENCOUNTER — TELEPHONE (OUTPATIENT)
Dept: FAMILY MEDICINE CLINIC | Facility: CLINIC | Age: 49
End: 2024-07-16

## 2024-07-16 ENCOUNTER — TELEPHONE (OUTPATIENT)
Dept: SURGERY | Facility: CLINIC | Age: 49
End: 2024-07-16

## 2024-07-16 VITALS
DIASTOLIC BLOOD PRESSURE: 73 MMHG | OXYGEN SATURATION: 92 % | BODY MASS INDEX: 43.41 KG/M2 | WEIGHT: 245 LBS | RESPIRATION RATE: 24 BRPM | HEIGHT: 63 IN | SYSTOLIC BLOOD PRESSURE: 114 MMHG | TEMPERATURE: 98 F | HEART RATE: 77 BPM

## 2024-07-16 DIAGNOSIS — N20.0 NEPHROLITHIASIS: Primary | ICD-10-CM

## 2024-07-16 DIAGNOSIS — R16.0 HEPATOMEGALY: ICD-10-CM

## 2024-07-16 DIAGNOSIS — R11.2 NAUSEA AND VOMITING IN ADULT: ICD-10-CM

## 2024-07-16 DIAGNOSIS — N20.0 RIGHT KIDNEY STONE: Primary | ICD-10-CM

## 2024-07-16 DIAGNOSIS — R10.9 RECURRENT ABDOMINAL PAIN: ICD-10-CM

## 2024-07-16 DIAGNOSIS — N20.0 RENAL CALCULI: Primary | ICD-10-CM

## 2024-07-16 LAB
ALBUMIN SERPL-MCNC: 4.5 G/DL (ref 3.2–4.8)
ALP LIVER SERPL-CCNC: 91 U/L
ALT SERPL-CCNC: 28 U/L
ANION GAP SERPL CALC-SCNC: 8 MMOL/L (ref 0–18)
AST SERPL-CCNC: 26 U/L (ref ?–34)
B-HCG UR QL: NEGATIVE
BASOPHILS # BLD AUTO: 0.06 X10(3) UL (ref 0–0.2)
BASOPHILS NFR BLD AUTO: 0.5 %
BILIRUB DIRECT SERPL-MCNC: 0.4 MG/DL (ref ?–0.3)
BILIRUB SERPL-MCNC: 1.2 MG/DL (ref 0.3–1.2)
BILIRUB UR QL: NEGATIVE
BUN BLD-MCNC: 13 MG/DL (ref 9–23)
BUN/CREAT SERPL: 17.3 (ref 10–20)
CALCIUM BLD-MCNC: 9 MG/DL (ref 8.7–10.4)
CHLORIDE SERPL-SCNC: 109 MMOL/L (ref 98–112)
CO2 SERPL-SCNC: 25 MMOL/L (ref 21–32)
COLOR UR: YELLOW
CREAT BLD-MCNC: 0.75 MG/DL
DEPRECATED RDW RBC AUTO: 42.2 FL (ref 35.1–46.3)
EGFRCR SERPLBLD CKD-EPI 2021: 98 ML/MIN/1.73M2 (ref 60–?)
EOSINOPHIL # BLD AUTO: 0.46 X10(3) UL (ref 0–0.7)
EOSINOPHIL NFR BLD AUTO: 4.1 %
ERYTHROCYTE [DISTWIDTH] IN BLOOD BY AUTOMATED COUNT: 13.2 % (ref 11–15)
GLUCOSE BLD-MCNC: 178 MG/DL (ref 70–99)
GLUCOSE UR-MCNC: >1000 MG/DL
HCT VFR BLD AUTO: 45.9 %
HGB BLD-MCNC: 15.3 G/DL
HYALINE CASTS #/AREA URNS AUTO: PRESENT /LPF
IMM GRANULOCYTES # BLD AUTO: 0.04 X10(3) UL (ref 0–1)
IMM GRANULOCYTES NFR BLD: 0.4 %
LEUKOCYTE ESTERASE UR QL STRIP.AUTO: 75
LIPASE SERPL-CCNC: 30 U/L (ref 13–75)
LYMPHOCYTES # BLD AUTO: 2.6 X10(3) UL (ref 1–4)
LYMPHOCYTES NFR BLD AUTO: 23.3 %
MCH RBC QN AUTO: 29.3 PG (ref 26–34)
MCHC RBC AUTO-ENTMCNC: 33.3 G/DL (ref 31–37)
MCV RBC AUTO: 87.9 FL
MONOCYTES # BLD AUTO: 0.76 X10(3) UL (ref 0.1–1)
MONOCYTES NFR BLD AUTO: 6.8 %
NEUTROPHILS # BLD AUTO: 7.22 X10 (3) UL (ref 1.5–7.7)
NEUTROPHILS # BLD AUTO: 7.22 X10(3) UL (ref 1.5–7.7)
NEUTROPHILS NFR BLD AUTO: 64.9 %
NITRITE UR QL STRIP.AUTO: NEGATIVE
OSMOLALITY SERPL CALC.SUM OF ELEC: 299 MOSM/KG (ref 275–295)
PH UR: 5.5 [PH] (ref 5–8)
PLATELET # BLD AUTO: 246 10(3)UL (ref 150–450)
POTASSIUM SERPL-SCNC: 3.8 MMOL/L (ref 3.5–5.1)
PROT SERPL-MCNC: 7 G/DL (ref 5.7–8.2)
PROT UR-MCNC: 100 MG/DL
RBC # BLD AUTO: 5.22 X10(6)UL
RBC #/AREA URNS AUTO: >10 /HPF
SODIUM SERPL-SCNC: 142 MMOL/L (ref 136–145)
SP GR UR STRIP: 1.03 (ref 1–1.03)
UROBILINOGEN UR STRIP-ACNC: NORMAL
WBC # BLD AUTO: 11.1 X10(3) UL (ref 4–11)

## 2024-07-16 PROCEDURE — 96374 THER/PROPH/DIAG INJ IV PUSH: CPT

## 2024-07-16 PROCEDURE — 96361 HYDRATE IV INFUSION ADD-ON: CPT

## 2024-07-16 PROCEDURE — 87086 URINE CULTURE/COLONY COUNT: CPT | Performed by: EMERGENCY MEDICINE

## 2024-07-16 PROCEDURE — 83690 ASSAY OF LIPASE: CPT | Performed by: EMERGENCY MEDICINE

## 2024-07-16 PROCEDURE — 99204 OFFICE O/P NEW MOD 45 MIN: CPT | Performed by: UROLOGY

## 2024-07-16 PROCEDURE — 80048 BASIC METABOLIC PNL TOTAL CA: CPT | Performed by: EMERGENCY MEDICINE

## 2024-07-16 PROCEDURE — 96375 TX/PRO/DX INJ NEW DRUG ADDON: CPT

## 2024-07-16 PROCEDURE — 85025 COMPLETE CBC W/AUTO DIFF WBC: CPT | Performed by: EMERGENCY MEDICINE

## 2024-07-16 PROCEDURE — 99285 EMERGENCY DEPT VISIT HI MDM: CPT

## 2024-07-16 PROCEDURE — 81025 URINE PREGNANCY TEST: CPT

## 2024-07-16 PROCEDURE — 99284 EMERGENCY DEPT VISIT MOD MDM: CPT

## 2024-07-16 PROCEDURE — 81001 URINALYSIS AUTO W/SCOPE: CPT | Performed by: EMERGENCY MEDICINE

## 2024-07-16 PROCEDURE — 74177 CT ABD & PELVIS W/CONTRAST: CPT | Performed by: EMERGENCY MEDICINE

## 2024-07-16 PROCEDURE — 80076 HEPATIC FUNCTION PANEL: CPT | Performed by: EMERGENCY MEDICINE

## 2024-07-16 RX ORDER — HYDROCODONE BITARTRATE AND ACETAMINOPHEN 5; 325 MG/1; MG/1
1 TABLET ORAL EVERY 6 HOURS PRN
Qty: 10 TABLET | Refills: 0 | Status: SHIPPED | OUTPATIENT
Start: 2024-07-16 | End: 2024-07-21

## 2024-07-16 RX ORDER — ONDANSETRON 4 MG/1
4 TABLET, ORALLY DISINTEGRATING ORAL
Qty: 10 TABLET | Refills: 0 | Status: SHIPPED | OUTPATIENT
Start: 2024-07-16 | End: 2024-07-23

## 2024-07-16 RX ORDER — TAMSULOSIN HYDROCHLORIDE 0.4 MG/1
0.4 CAPSULE ORAL DAILY
Qty: 7 CAPSULE | Refills: 0 | Status: SHIPPED | OUTPATIENT
Start: 2024-07-16 | End: 2024-07-16

## 2024-07-16 RX ORDER — KETOROLAC TROMETHAMINE 10 MG/1
10 TABLET, FILM COATED ORAL EVERY 6 HOURS PRN
Qty: 28 TABLET | Refills: 0 | Status: SHIPPED | OUTPATIENT
Start: 2024-07-16 | End: 2024-07-23

## 2024-07-16 RX ORDER — ONDANSETRON 2 MG/ML
4 INJECTION INTRAMUSCULAR; INTRAVENOUS ONCE
Status: COMPLETED | OUTPATIENT
Start: 2024-07-16 | End: 2024-07-16

## 2024-07-16 RX ORDER — MORPHINE SULFATE 4 MG/ML
4 INJECTION, SOLUTION INTRAMUSCULAR; INTRAVENOUS ONCE
Status: COMPLETED | OUTPATIENT
Start: 2024-07-16 | End: 2024-07-16

## 2024-07-16 RX ORDER — KETOROLAC TROMETHAMINE 15 MG/ML
15 INJECTION, SOLUTION INTRAMUSCULAR; INTRAVENOUS ONCE
Status: COMPLETED | OUTPATIENT
Start: 2024-07-16 | End: 2024-07-16

## 2024-07-16 RX ORDER — TAMSULOSIN HYDROCHLORIDE 0.4 MG/1
0.4 CAPSULE ORAL DAILY
Qty: 30 CAPSULE | Refills: 0 | Status: SHIPPED | OUTPATIENT
Start: 2024-07-16 | End: 2024-08-15

## 2024-07-16 NOTE — ED INITIAL ASSESSMENT (HPI)
Patient c/o LRQ abdominal pain that's been coming on/off for the past 2 months. Patient states +N/V, and saw her MD last week and had a CT done but did not find anything per patient report. Denies fevers, bodyaches/chills.

## 2024-07-16 NOTE — TELEPHONE ENCOUNTER
I called AR and when I s/w her I realized that she did not do surgery on this pt but she saw her in the office today and had her schd for surgery. I apologized for bother her and she stated that I can tell the pt that she can replace the Ketoralac with Ibuprofen.     I called pt to tell her this and pt was thankful for the c/b.

## 2024-07-16 NOTE — TELEPHONE ENCOUNTER
Patient calling as pharmacy did not fill Ketorolac Tromethamine 10 MG Oral Tab. Patient would like to know if its needed and if provider can process prescription. Please advise if order is sent to pharmacy

## 2024-07-16 NOTE — TELEPHONE ENCOUNTER
Patient seen in office, scheduled and confirmed  Cystoscopy right ureteroscopy, laser lithotripsy, right retrograde pyelogram, right stent placement, went over pre-op/lab instructions.

## 2024-07-16 NOTE — ED PROVIDER NOTES
[place] Emergency Department Note  Patient: Kay Tracy Age: 49 year old Sex: female      MRN: [id number]  : 1975    Patient Seen in: [facility] Emergency Department    History     Chief Complaint   Patient presents with    Abdomen/Flank Pain     Stated Complaint: abdominal pain    History obtained from: patient     49F hx of DM, HTN, HLD, obesity, PCOS, s/p appendectomy presents to the ED with complaints of abdominal pain.  Patient states over the past couple of months she has been having intermittent right lower and sometimes generalized abdominal pain that seems to come and go and is not particular associated with anything.  She states since last night has been having constant, severe right mid to lower abdominal pain that is nonradiating with associated nausea and multiple episodes of nonbloody emesis.  Denies diarrhea or bloody stools.  She denies hematuria or frequency.  Denies fevers or chills.  No chest pain or shortness of breath.  States has been having this pain for a while but it has never been this severe.  States had a KUB done a couple of days ago and was not told anything was abnormal.    Review of Systems:  Review of Systems  Positive for stated complaint: abdominal pain. Constitutional and vital signs reviewed. All other systems reviewed and negative except as noted above.    Patient History:  Past Medical History:    Allergic rhinitis    Asthma    allergy induced    Carpal tunnel syndrome    Diabetes (HCC)    diabetes for 2011    Eczema    2-3 years ago, only on right fingers    Essential hypertension    Normal BP readings at home    GDM (gestational diabetes mellitus) (HCC)    High blood pressure    High cholesterol    HORMONE DISORDER    PCOS    Infertility female    Obesity    Other and unspecified hyperlipidemia    PCOS (polycystic ovarian syndrome)    Seasonal allergies    Shoulder tendonitis    Type II or unspecified type diabetes mellitus without mention of complication,  uncontrolled       Past Surgical History:   Procedure Laterality Date    Adenoidectomy  1980      ,    Carpal tunnel release Right     Cataract extraction w/  intraocular lens implant Right 2023    Dr. Ny @ [facility]    Colonoscopy N/A 02/15/2022    Procedure: COLONOSCOPY;  Surgeon: Bobby Ann MD;  Location: [facility] ENDOSCOPY    D & c  ?    Laparoscopic appendectomy  2015    Other surgical history      IVF    Reduction of large breast  1998    Tonsillectomy          Family History   Problem Relation Age of Onset    Hypertension Father     Obesity Father     Cancer Paternal Grandfather         Esophageal cancer    Diabetes Paternal Grandmother     Stroke Paternal Grandmother     Cancer Son         Medulloblastoma    Glaucoma Neg     Macular degeneration Neg        Specific Social Determinants of Health:   Social History     Socioeconomic History    Marital status:     Number of children: 1   Occupational History    Occupation: speech therapist   Tobacco Use    Smoking status: Never    Smokeless tobacco: Never   Vaping Use    Vaping status: Never Used   Substance and Sexual Activity    Alcohol use: Never    Drug use: Never    Sexual activity: Yes   Other Topics Concern    Caffeine Concern Yes     Comment: soda-1 cup/day    Reaction to local anesthetic No    Pt has a pacemaker No    Pt has a defibrillator No           PSFH elements reviewed from today and agreed except as otherwise stated in HPI.    Physical Exam     ED Triage Vitals [24 0350]   /88   Pulse 100   Resp 24   Temp 98.1 °F (36.7 °C)   Temp src Oral   SpO2 97 %   O2 Device None (Room air)       Current:/73   Pulse 77   Temp 98.1 °F (36.7 °C) (Oral)   Resp 24   Ht 160 cm (5' 3\")   Wt 111.1 kg   SpO2 92%   BMI 43.40 kg/m²         Physical Exam  Vitals and nursing note reviewed.   Constitutional:       General: She is not in acute distress.     Appearance: She is diaphoretic.       Comments: Uncomfortable appearing    HENT:      Head: Normocephalic and atraumatic.      Right Ear: External ear normal.      Left Ear: External ear normal.      Nose: Nose normal.      Mouth/Throat:      Mouth: Mucous membranes are moist.   Eyes:      Conjunctiva/sclera: Conjunctivae normal.   Cardiovascular:      Rate and Rhythm: Normal rate and regular rhythm.      Heart sounds: No murmur heard.  Pulmonary:      Effort: Pulmonary effort is normal. No respiratory distress.      Breath sounds: No wheezing or rales.   Abdominal:      General: There is no distension.      Palpations: Abdomen is soft.      Tenderness: There is generalized abdominal tenderness and tenderness in the right lower quadrant. There is no right CVA tenderness, left CVA tenderness, guarding or rebound. Negative signs include Workman's sign.   Musculoskeletal:         General: No deformity.   Skin:     General: Skin is warm.      Capillary Refill: Capillary refill takes less than 2 seconds.   Neurological:      General: No focal deficit present.      Mental Status: She is alert.         ED Course   Labs:   Labs Reviewed   BASIC METABOLIC PANEL (8) - Abnormal; Notable for the following components:       Result Value    Glucose 178 (*)     Calculated Osmolality 299 (*)     All other components within normal limits   HEPATIC FUNCTION PANEL (7) - Abnormal; Notable for the following components:    Bilirubin, Direct 0.4 (*)     All other components within normal limits   URINALYSIS WITH CULTURE REFLEX - Abnormal; Notable for the following components:    Clarity Urine Turbid (*)     Glucose Urine >1000 (*)     Ketones Urine Trace (*)     Blood Urine 3+ (*)     Protein Urine 100 (*)     Leukocyte Esterase Urine 75 (*)     WBC Urine 6-10 (*)     RBC Urine >10 (*)     Squamous Epi. Cells Few (*)     Ca Oxalate Crystals Few (*)     Hyaline Casts Present (*)     All other components within normal limits   CBC W/ DIFFERENTIAL - Abnormal; Notable for the  following components:    WBC 11.1 (*)     All other components within normal limits   LIPASE - Normal   POCT PREGNANCY URINE - Normal   CBC WITH DIFFERENTIAL WITH PLATELET    Narrative:     The following orders were created for panel order CBC With Differential With Platelet.  Procedure                               Abnormality         Status                     ---------                               -----------         ------                     CBC W/ DIFFERENTIAL[[id number]]          Abnormal            Final result                 Please view results for these tests on the individual orders.   URINE CULTURE, ROUTINE     Radiology findings:  I personally reviewed the images.   CT ABDOMEN+PELVIS(CONTRAST ONLY)(CPT=74177)    Result Date: 7/16/2024  CONCLUSION:  1. An obstructing 0.7-0.8 cm calculus is lodged at the right ureteropelvic junction with resultant right-sided hydronephrosis.  2. There is urothelial enhancement of the right renal pelvis, which may relate to obstructive uropathy, but correlation with urinalysis is recommended to exclude superimposed infection of the obstructed collecting system.  3. Hepatomegaly with underlying hepatic steatosis.  4. An intrauterine contraceptive device is present.  5. Left adnexal follicular cystic lesion, likely physiologic.  6. Postprocedural changes suggesting probable appendectomy.   7. Lesser incidental findings as above.    A preliminary report was issued by the [facility] teleradiology service. There are no major discrepancies.   Dictated by (CST): Reinaldo Mancia MD on 7/16/2024 at 6:51 AM     Finalized by (CST): Reinaldo Mancia MD on 7/16/2024 at 6:57 AM               MDM   49F hx of DM, HTN, HLD, obesity, PCOS, presents to the ED with complaints of abdominal pain.     Differential diagnosis includes:    Upper GI pathology including gastritis, PUD, cholecystitis, choledocholithiasis, pancreatitis, biliary colic, SBO     Lower GI pathology including  diverticulitis, intraabdominal abscess, volvulus      pathology including kidney stone, pyelonephritis or UTI     Plan: will obtain cbc, cmp, lipase, upreg, UA/Ucx, CTAP for further disposition     If CT unrevealing will obtain TVUS to eval for ovarian cyst or less likely torsion     ED Course as of 07/16/24 0707  ------------------------------------------------------------  Time: 07/16 0435  Value: WBC(!): 11.1  Comment: (Reviewed)  ------------------------------------------------------------  Time: 07/16 0435  Value: POCT urine pregnancy: Negative  Comment: (Reviewed)  ------------------------------------------------------------  Time: 07/16 0446  Comment: Bmp hepatic panel unremarkable. Lipase normal.   ------------------------------------------------------------  Time: 07/16 0450  Value: Urinalysis with Culture Reflex(!)  Comment: UA slightly contaminated, no bacteria or nitrite, Ucx sent. 3+ blood.   ------------------------------------------------------------  Time: 07/16 0704  Value: Urinalysis with Culture Reflex(!)  Comment: Impression  CONCLUSION:  1. An obstructing 0.7-0.8 cm calculus is lodged at the right ureteropelvic junction with resultant right-sided hydronephrosis.     2. There is urothelial enhancement of the right renal pelvis, which may relate to obstructive uropathy, but correlation with urinalysis is recommended to exclude superimposed infection of the obstructed collecting system.     3. Hepatomegaly with underlying hepatic steatosis.     4. An intrauterine contraceptive device is present.     5. Left adnexal follicular cystic lesion, likely physiologic.     6. Postprocedural changes suggesting probable appendectomy.       7. Lesser incidental findings as above.           A preliminary report was issued by the [facility] teleradiology service. There are no major discrepancies.    Patient updated with all results.  All questions answered.  States she currently is pain-free.  Discussed  with her that urine is slightly contaminated though we will send a culture I have low suspicion for UTI particularly given her lack of other urinary symptoms.  She is hemodynamically stable.  Discussed with her stone is somewhat large and may not pass on its own however if her symptoms are controlled we will discharge her with close outpatient follow-up, give symptomatic control medicines, Flomax, and follow-up with urology within 1 week.  I instructed to return [place] to the ER immediately with any new or worsening symptoms including fevers, new or worsening or uncontrolled pain, or any other new or worsening signs or symptoms.  She verbalized understanding and is comfortable with the discharge plan at this time            Procedures:  Procedures        Disposition and Plan     Clinical Impression:  1. Right kidney stone    2. Recurrent abdominal pain    3. Nausea and vomiting in adult    4. Hepatomegaly        Disposition:  Discharge    Follow-up:  Jessica Wong MD  [address] [place] [address] [address]  [facility] [id number]  [phone]    Schedule an appointment as soon as possible for a visit in 2 day(s)      [facility] Emergency Department  [address]  [facility] [address]  [phone]  Go to  If symptoms worsen, immediately    Gwendolyn Parham MD  [address] [facility] [address]  [place] [address]  [phone]    Schedule an appointment as soon as possible for a visit in 1 week(s)        Medications Prescribed:  Current Discharge Medication List        START taking these medications    Details   tamsulosin (FLOMAX) 0.4 MG Oral Cap Take 1 capsule (0.4 mg total) by mouth daily for 7 days.  Qty: 7 capsule, Refills: 0    Associated Diagnoses: Right kidney stone      ondansetron 4 MG Oral Tablet Dispersible Take 1 tablet (4 mg total) by mouth every 4 to 6 hours as needed for Nausea.  Qty: 10 tablet, Refills: 0    Associated Diagnoses: Right kidney stone      Ketorolac Tromethamine 10 MG Oral Tab  Take 1 tablet (10 mg total) by mouth every 6 (six) hours as needed.  Qty: 28 tablet, Refills: 0    Associated Diagnoses: Right kidney stone      HYDROcodone-acetaminophen 5-325 MG Oral Tab Take 1 tablet by mouth every 6 (six) hours as needed for Pain.  Qty: 10 tablet, Refills: 0    Associated Diagnoses: Right kidney stone               This note may have been created using voice dictation technology and may include inadvertent errors.      Shelbi Edwards, DO  Attending Physician   Emergency Medicine

## 2024-07-16 NOTE — TELEPHONE ENCOUNTER
Patient calling ( name and date of birth of patient verified )  was seen in the ER today dx with renal calculi  ( 6mm stone )     Has to see Urology in Friday and needs a referral     Future Appointments   Date Time Provider Department Center   7/19/2024  8:45 AM Raquel Higgins MD [facility]         Referral pended for your review, completion and approval.

## 2024-07-16 NOTE — DISCHARGE INSTRUCTIONS
Thank you for seeking care at [facility] Emergency Department.  You have been seen, evaluated, and diagnosed with kidney stone on the right side measuring 6mm.    We reviewed the results from your visit in the emergency department.    Please read the instructions provided   If provided, take prescriptions as instructed.     Please follow up with the urologist in 3-5 days. Call today or tomorrow to schedule an appointment. Ensure that you stay well hydrated while you attempt to pass this stone. You should strain your urine with a urine strainer to monitor for passage of the kidney stone.    Remember, your care process does not end after your visit today. Please follow-up with your doctor within 1-2 days for a follow-up check to ensure you are  improving, to see if you need any further evaluation/testing, or to evaluate for any alternate diagnoses.     Please return to the emergency department immediately if your symptoms are persisting for more than 48 hours, getting worse, or you begin having fevers or chills, or if you develop any other new or concerning symptoms as these could be signs of more serious medical illness. Fevers with a kidney stone always requires prompt reevaluation.    We hope you feel better.

## 2024-07-16 NOTE — TELEPHONE ENCOUNTER
Patient has been notified via telephone       Referred to Provider Information:  Provider Address Phone   Raquel Higgins MD [facility] [address]  [facility] [id number] [phone]

## 2024-07-16 NOTE — TELEPHONE ENCOUNTER
Hi!  This patient needs a ureteroscopy. Should be booked for 30 min on July 30. Needs labs as ordered below.    Thanks!  AR    Urology Surgery Scheduling Request    Location: [facility] OR    Surgeon: JESÚS Higgins MD    Asst. Surgeon: N/A    Diagnosis: Nephrolithiasis    Procedure: Cystoscopy right ureteroscopy, laser lithotripsy, retrograde pyelogram, stent placement     Anesthesia: General     Time Frame: July 30    Time needed: 30 min    Special Equipment: Holmium Laser    On Call to OR: ancef    Admission: Day Surgery    Pre-op Testing: already completed    Need Pre-op Clearance: none    Estimated Post Op/Follow Up Appt: tbd.    Raquel Higgins MD

## 2024-07-16 NOTE — PROGRESS NOTES
Raquel Higgins MD  Department of Urology  [address] [place] [address] [address]  [place] [address]    T: [phone]  F: [phone]    To: Jessica Wong MD   [address] [place] [address] [address]  [facility] [id number]    Re: Kay Tracy   MRN: [id number]  : 1975    Dear Jessica Wong MD,    Today I had the pleasure of seeing Kay Tracy in my clinic. As you know, Ms. Tracy is a pleasant 49 year old year old female who I am seeing for kidney stones. Patient was last seen in this department on Visit date not found.    Briefly, patient was in the ER this morning for flank pain. She states that she has had flank pain for 2-3 months. Tmax 98.1, , RR 24, /88. Cr 0.75, WBC 11.1, UcX pending UA nitrite negative    She feels well today.       PAST MEDICAL HISTORY:  Past Medical History:    Allergic rhinitis    Asthma    allergy induced    Carpal tunnel syndrome    Diabetes (HCC)    diabetes for 2011    Eczema    2-3 years ago, only on right fingers    Essential hypertension    Normal BP readings at home    GDM (gestational diabetes mellitus) (HCC)    High blood pressure    High cholesterol    HORMONE DISORDER    PCOS    Infertility female    Obesity    Other and unspecified hyperlipidemia    PCOS (polycystic ovarian syndrome)    Seasonal allergies    Shoulder tendonitis    Type II or unspecified type diabetes mellitus without mention of complication, uncontrolled        PAST SURGICAL HISTORY:  Past Surgical History:   Procedure Laterality Date    Adenoidectomy        ,    Carpal tunnel release Right     Cataract extraction w/  intraocular lens implant Right 2023    Dr. Ny @ [facility]    Colonoscopy N/A 02/15/2022    Procedure: COLONOSCOPY;  Surgeon: Bobby Ann MD;  Location: [facility] ENDOSCOPY    D & c  ?    Laparoscopic appendectomy  2015    Other surgical history      IVF    Reduction of large breast  1998    Tonsillectomy            ALLERGIES:  Allergies   Allergen Reactions    Cat Dander [Dander]     Iodine (Topical)     Seasonal     Nickel RASH         MEDICATIONS:  Current Outpatient Medications   Medication Instructions    atorvastatin 40 MG Oral Tab TAKE 1 TABLET BY MOUTH EVERY DAY AT NIGHT    Ciclopirox 8 % External Solution 1 mL, Topical, Nightly    Continuous Glucose Sensor (DEXCOM G7 SENSOR) Does not apply Misc 1 each, Does not apply, Every 10 days    cyclobenzaprine (FLEXERIL) 5 mg, Oral, 3 times daily PRN    dapagliflozin (FARXIGA) 10 mg, Oral, Daily    fluocinonide 0.05 % External Cream Use twice daily on affected areas, itchy bumps on hands    HYDROcodone-acetaminophen 5-325 MG Oral Tab 1 tablet, Oral, Every 6 hours PRN    Insulin Pen Needle 32G X 4 MM Does not apply Misc Inject 3x daily    insulin regular human, conc, (HUMULIN R U-500 KWIKPEN) 500 UNIT/ML Subcutaneous Solution Pen-injector Inject 0.24 mL (120 Units total) into the skin daily with breakfast AND 0.24 mL (120 Units total) daily with lunch AND 0.25 mL (125 Units total) daily with dinner.    Ketorolac Tromethamine (TORADOL) 10 mg, Oral, Every 6 hours PRN    levonorgestrel 20 MCG/24HR Intrauterine IUD Mirena 20 mcg/24 hr (5 years) intrauterine device   Take by intrauterine route.    lisinopril (PRINIVIL; ZESTRIL) 5 mg, Oral, Daily    meclizine (ANTIVERT) 25 mg, Oral, 3 times daily PRN    metFORMIN  MG Oral Tablet 24 Hr TAKE FOUR TABLETS BY MOUTH DAILY WITH BREAKFAST.    ondansetron (ZOFRAN-ODT) 4 mg, Oral, EVERY 4 TO 6 HOURS PRN    Ozempic (1 MG/DOSE) 1 mg, Subcutaneous    tamsulosin (FLOMAX) 0.4 mg, Oral, Daily    traZODone (DESYREL) 50 mg, Oral, Nightly PRN        FAMILY HISTORY:  Family History   Problem Relation Age of Onset    Hypertension Father     Obesity Father     Cancer Paternal Grandfather         Esophageal cancer    Diabetes Paternal Grandmother     Stroke Paternal Grandmother     Cancer Son         Medulloblastoma    Glaucoma Neg     Macular  degeneration Neg         SOCIAL HISTORY:  Social History     Socioeconomic History    Marital status:     Number of children: 1   Occupational History    Occupation: speech therapist   Tobacco Use    Smoking status: Never    Smokeless tobacco: Never   Vaping Use    Vaping status: Never Used   Substance and Sexual Activity    Alcohol use: Never    Drug use: Never    Sexual activity: Yes   Other Topics Concern    Caffeine Concern Yes     Comment: soda-1 cup/day    Reaction to local anesthetic No    Pt has a pacemaker No    Pt has a defibrillator No          PHYSICAL EXAMINATION:  There were no vitals filed for this visit.  CONSTITUTIONAL: No apparent distress, cooperative and communicative  NEUROLOGIC: Alert and oriented   HEAD: Normocephalic, atraumatic   EYES: Sclera non-icteric   ENT: Hearing intact, moist mucous membranes   NECK: No obvious goiter or masses   RESPIRATORY: Normal respiratory effort, Nonlabored breathing on room air  SKIN: No evident rashes   ABDOMEN: Soft, nontender, nondistended, no rebound tenderness, no guarding, no masses    REVIEW OF SYSTEMS:    A comprehensive 10-point review of systems was completed.  Pertinent positives and negatives are noted in the the HPI.       LABORATORY DATA:      Component  Ref Range & Units 7/16/24  4:30 AM   POCT Urine Pregnancy  Negative Negative            Component  Ref Range & Units 7/16/24  4:30 AM   Urine Color  Yellow Yellow   Clarity Urine  Clear Turbid Abnormal    Spec Gravity  1.005 - 1.030 1.029   Glucose Urine  Normal mg/dL >1000 Abnormal    Bilirubin Urine  Negative Negative   Ketones Urine  Negative mg/dL Trace Abnormal    Blood Urine  Negative 3+ Abnormal    pH Urine  5.0 - 8.0 5.5   Protein Urine  Negative mg/dL 100 Abnormal    Urobilinogen Urine  Normal Normal   Nitrite Urine  Negative Negative   Leukocyte Esterase Urine  Negative 75 Abnormal    Comment:  Heather/uL Interpretation  25          Trace  75          1+  250         2+  500          3+   WBC Urine  0 - 5 /HPF 6-10 Abnormal    RBC Urine  0 - 2 /HPF >10 Abnormal    Bacteria Urine  None Seen /HPF None Seen   Squamous Epi. Cells  None Seen /HPF Few Abnormal    Renal Tubular Epithelial Cells  None Seen /HPF None Seen   Transitional Cells  None Seen /HPF None Seen   Ca Oxalate Crystals  None Seen /HPF Few Abnormal    Hyaline Casts  None Seen /LPF Present Abnormal    Yeast Urine  None Seen /HPF None Seen   Resulting Agency [facility] ([facility])        BC  4.0 - 11.0 x10(3) uL 11.1 High    RBC  3.80 - 5.30 x10(6)uL 5.22   HGB  12.0 - 16.0 g/dL 15.3   HCT  35.0 - 48.0 % 45.9   MCV  80.0 - 100.0 fL 87.9   MCH  26.0 - 34.0 pg 29.3   MCHC  31.0 - 37.0 g/dL 33.3   RDW-SD  35.1 - 46.3 fL 42.2   RDW  11.0 - 15.0 % 13.2   PLT  150.0 - 450.0 10(3)uL 246.0         Component  Ref Range & Units 7/16/24  4:11 AM   AST  <=34 U/L 26   ALT  10 - 49 U/L 28   Alkaline Phosphatase  39 - 100 U/L 91   Bilirubin, Total  0.3 - 1.2 mg/dL 1.2   Bilirubin, Direct  <=0.3 mg/dL 0.4 High    Total Protein  5.7 - 8.2 g/dL 7.0   Albumin  3.2 - 4.8 g/dL 4.5            Component  Ref Range & Units 7/16/24  4:11 AM   Glucose  70 - 99 mg/dL 178 High    Sodium  136 - 145 mmol/L 142   Potassium  3.5 - 5.1 mmol/L 3.8   Chloride  98 - 112 mmol/L 109   CO2  21.0 - 32.0 mmol/L 25.0   Anion Gap  0 - 18 mmol/L 8   BUN  9 - 23 mg/dL 13   Creatinine  0.55 - 1.02 mg/dL 0.75   BUN/CREA Ratio  10.0 - 20.0 17.3   Calcium, Total  8.7 - 10.4 mg/dL 9.0   Calculated Osmolality  275 - 295 mOsm/kg 299 High    eGFR-Cr  >=60 mL/min/1.73m2 98              IMAGING REVIEW:  Narrative   PROCEDURE: CT ABDOMEN + PELVIS (CONTRAST ONLY) (CPT=74177)     COMPARISON: None available.     INDICATIONS: Generalized abdominal pain, worse in the right lower quadrant, with history of appendicitis.     TECHNIQUE: Multidetector CT images of the abdomen and pelvis were obtained with non-ionic intravenous contrast material. Automated exposure control for dose reduction was used.  Adjustment of the mA and/or kV was done based on the patient's size.  Iterative reconstruction technique for dose reduction was employed. Dose information was transmitted to the ACR (American College of Radiology) NRDR (National Radiology Data Registry), which includes the Dose Index Registry. Oral contrast was not  ingested.     FINDINGS:  LUNG BASES: The heart is normal in size. Mild mitral annular calcifications are observed.  There is dependent subsegmental atelectasis bilaterally.  LIVER: Enlarged, measuring 21.3 cm in craniocaudal axis, and diffusely hypoattenuating with areas of relative hyperdensity adjacent to the gallbladder fossa, consistent with hepatic steatosis and areas of fatty sparing. Isolated low density adjacent to  the falciform ligament is characteristic for more pronounced focal fatty change.  BILIARY: The gallbladder is present.  PANCREAS: No lesion, fluid collection, ductal dilatation, or atrophy.    SPLEEN: No enlargement.    ADRENALS:   No defined mass or abnormal enlargement.    KIDNEYS:   An obstructing 0.6 x 0.8 x 0.7 cm calculus (density of up to 969 Hounsfield units) is present, lodged at the right ureteropelvic junction. Right-sided hydronephrosis is seen. Mild right-sided urothelial enhancement is present. Lobulated renal  contours are demonstrated bilaterally.  GI/MESENTERY: There is no evidence of bowel obstruction. The appendix is not seen, and suture material is present at the base of the cecum, perhaps related to history of appendectomy. The distal colon is largely decompressed and is not well assessed.    URINARY BLADDER: No visible calculus or focal wall thickening.    PELVIC NODES: No lymphadenopathy.    PELVIC ORGANS: The uterus is present. An intrauterine contraceptive device is seen. There is a left adnexal 3.5 x 2.7 x 3.8 cm cystic lesion.  VASCULATURE:   No aneurysm is detected.  A retroaortic left renal vein is present.  RETROPERITONEUM: No mass or lymphadenopathy is  apparent.    BONES:   Mild scoliosis and multilevel degenerative changes are seen throughout the spine.  ABDOMINAL WALL: There is transversely oriented lower abdominopelvic wall scarring, suggestive of prior Pfannenstiel incision. Mild midline ventral abdominal scarring is seen. Cutaneous thickening of the ventral abdominal wall is present.  OTHER: No free air or fluid is seen in the abdomen or pelvis.                    Impression   CONCLUSION:  1. An obstructing 0.7-0.8 cm calculus is lodged at the right ureteropelvic junction with resultant right-sided hydronephrosis.     2. There is urothelial enhancement of the right renal pelvis, which may relate to obstructive uropathy, but correlation with urinalysis is recommended to exclude superimposed infection of the obstructed collecting system.     3. Hepatomegaly with underlying hepatic steatosis.     4. An intrauterine contraceptive device is present.     5. Left adnexal follicular cystic lesion, likely physiologic.     6. Postprocedural changes suggesting probable appendectomy.       7. Lesser incidental findings as above.           A preliminary report was issued by the [facility] teleradiology service. There are no major discrepancies.        Dictated by (CST): Reinaldo Mancia MD on 7/16/2024 at 6:51 AM      Finalized by (CST): Reinaldo Mancia MD on 7/16/2024 at 6:57 AM              OTHER RELEVANT DATA:   none     IMPRESSION: 5mm right proximal ureteral stone - offered patient MET versus URS. She opted for ureteroscopy.    We discussed ureteroscopy including how it is performed and associated risks. I reviewed risks including bleeding, infection, damage to surrounding structures (urethra, bladder, ureter, kidney), inability to treat the stone and need for stent alone, need for additional/multiple procedures, need for prolonged stent, need for nephrostomy tube, stent colic/discomfort (extreme flank pain, bladder discomfort/spasms, urinary urgency and  frequency, hematuria), ureteral stricture, ureteral avulsion requiring open surgery, sepsis, anesthesia complications (cardiorespiratory complications).     I also counseled patient that NSAIDs and blood thinning agents need to be stopped appropriately prior to surgery. Discussed return precautions including fevers, chills, nausea, vomiting, intractable pain. Stent colic including flank pain, urgency, frequency and blood in the urine is common. Patient agreed with the plan and understood the above.      Medical expulsive therapy includes increased hydration, straining of urine, pain control with ibuprofen, Tylenol, hot packs, monitoring for fevers and chills.  We usually pursue this over 4 to 6 weeks.  If patient passes the stone, no further intervention is needed.  If patient does not pass the stone in this timeframe, we can consider a CT scan to determine if the stone is present in which case we would proceed with surgery       PLAN:  Tamsulosin prescribed, she has pain meds at home  Ucx to be followed up  OR: cystoscopy, right ureteroscopy, laser lithotripsy, stent placement   no labs needed as she had this collected this morning in the emergency room-we will need to follow-up her urine culture  She knows to go to the emergency room if she has fevers or chills    Thank you for referring this very pleasant patient to my clinic. If you have any questions or concerns, please do not hesitate to contact me.    Sincerely,  Raquel Higgins MD    30 minutes were spent on this patient at this visit obtaining a history, reviewing medical records, developing a treatment plan, counseling and discussing treatment strategy with patient, coordination of care and documentation.     The 21st Century Cures Act makes medical notes available to patients in the interest of transparency.  However, please be advised that this is a medical document.  It is intended as a peer to peer communication.  It is written in medical language  and may contain abbreviations or verbiage that are technical and unfamiliar.  It may appear blunt or direct.  Medical documents are intended to carry relevant information, facts as evident, and the clinical opinion of the practitioner.

## 2024-07-25 ENCOUNTER — PATIENT OUTREACH (OUTPATIENT)
Dept: FAMILY MEDICINE CLINIC | Facility: CLINIC | Age: 49
End: 2024-07-25

## 2024-07-29 ENCOUNTER — ANESTHESIA EVENT (OUTPATIENT)
Dept: SURGERY | Facility: HOSPITAL | Age: 49
End: 2024-07-29
Payer: COMMERCIAL

## 2024-07-29 NOTE — DISCHARGE INSTRUCTIONS
Discharge Instructions   You should expect to have some blood in your urine, burning when you pee, urgency and frequency. This is normal. If you have a fever >101F, chills, nausea, vomiting, inability to urinate please go to the emergency room for evaluation.   Pain is normal after this procedure. Try to take Tylenol ibuprofen and use hot packs. Drink as much water as possible. Additionally you can try over the counter pyridium if you notice bladder discomfort.    We have also prescribed you 3 days of an antibiotic that I request that you take.    You have a follow up for stone treatment surgery no sooner than 3-4 weeks If the appointment has not been made please contact us at [phone]    Stone prevention methods including hydration (until urine is clear), limiting salt and red meat/meat intake, eating a normal calcium diet, and drinking lemon with water. We also talked about reasons to go to the emergency room - namely acute flank pain associated with fevers, chills, nausea or vomiting.   Call T: [phone] if you have any questions or concerns       HOME INSTRUCTIONS  AMBSURG HOME CARE INSTRUCTIONS: POST-OP ANESTHESIA  The medication that you received for sedation or general anesthesia can last up to 24 hours. Your judgment and reflexes may be altered, even if you feel like your normal self.      We Recommend:   Do not drive any motor vehicle or bicycle   Avoid mowing the lawn, playing sports, or working with power tools/applicances (power saws, electric knives or mixers)   That you have someone stay with you on your first night home   Do not drink alcohol or take sleeping pills or tranquilizers   Do not sign legal documents within 24 hours of your procedure   If you had a nerve block for your surgery, take extra care not to put any pressure on your arm or hand for 24 hours    It is normal:  For you to have a sore throat if you had a breathing tube during surgery (while you were asleep!). The sore throat  should get better within 48 hours. You can gargle with warm salt water (1/2 tsp in 4 oz warm water) or use a throat lozenge for comfort  To feel muscle aches or soreness especially in the abdomen, chest or neck. The achy feeling should go away in the next 24 hours  To feel weak, sleepy or \"wiped out\". Your should start feeling better in the next 24 hours.   To experience mild discomforts such as sore lip or tongue, headache, cramps, gas pains or a bloated feeling in your abdomen.   To experience mild back pain or soreness for a day or two if you had spinal or epidural anesthesia.   If you had laparoscopic surgery, to feel shoulder pain or discomfort on the day of surgery.   For some patients to have nausea after surgery/anesthesia    If you feel nausea or experience vomiting:   Try to move around less.   Eat less than usual or drink only liquids until the next morning   Nausea should resolve in about 24 hours    If you have a problem when you are at home:    Call your surgeons office   Discharge Instructions: After Your Surgery  You’ve just had surgery. During surgery, you were given medicine called anesthesia to keep you relaxed and free of pain. After surgery, you may have some pain or nausea. This is common. Here are some tips for feeling better and getting well after surgery.   Going home  Your healthcare provider will show you how to take care of yourself when you go home. They'll also answer your questions. Have an adult family member or friend drive you home. For the first 24 hours after your surgery:   Don't drive or use heavy equipment.  Don't make important decisions or sign legal papers.  Take medicines as directed.  Don't drink alcohol.  Have someone stay with you, if needed. They can watch for problems and help keep you safe.  Be sure to go to all follow-up visits with your healthcare provider. And rest after your surgery for as long as your provider tells you to.   Coping with pain  If you have pain  after surgery, pain medicine will help you feel better. Take it as directed, before pain becomes severe. Also, ask your healthcare provider or pharmacist about other ways to control pain. This might be with heat, ice, or relaxation. And follow any other instructions your surgeon or nurse gives you.      Stay on schedule with your medicine.     Tips for taking pain medicine  To get the best relief possible, remember these points:   Pain medicines can upset your stomach. Taking them with a little food may help.  Most pain relievers taken by mouth need at least 20 to 30 minutes to start to work.  Don't wait till your pain becomes severe before you take your medicine. Try to time your medicine so that you can take it before starting an activity. This might be before you get dressed, go for a walk, or sit down for dinner.  Constipation is a common side effect of some pain medicines. Call your healthcare provider before taking any medicines such as laxatives or stool softeners to help ease constipation. Also ask if you should skip any foods. Drinking lots of fluids and eating foods such as fruits and vegetables that are high in fiber can also help. Remember, don't take laxatives unless your surgeon has prescribed them.  Drinking alcohol and taking pain medicine can cause dizziness and slow your breathing. It can even be deadly. Don't drink alcohol while taking pain medicine.  Pain medicine can make you react more slowly to things. Don't drive or run machinery while taking pain medicine.  Your healthcare provider may tell you to take acetaminophen to help ease your pain. Ask them how much you're supposed to take each day. Acetaminophen or other pain relievers may interact with your prescription medicines or other over-the-counter (OTC) medicines. Some prescription medicines have acetaminophen and other ingredients in them. Using both prescription and OTC acetaminophen for pain can cause you to accidentally overdose. Read  the labels on your OTC medicines with care. This will help you to clearly know the list of ingredients, how much to take, and any warnings. It may also help you not take too much acetaminophen. If you have questions or don't understand the information, ask your pharmacist or healthcare provider to explain it to you before you take the OTC medicine.   Managing nausea  Some people have an upset stomach (nausea) after surgery. This is often because of anesthesia, pain, or pain medicine, less movement of food in the stomach, or the stress of surgery. These tips will help you handle nausea and eat healthy foods as you get better. If you were on a special food plan before surgery, ask your healthcare provider if you should follow it while you get better. Check with your provider on how your eating should progress. It may depend on the surgery you had. These general tips may help:   Don't push yourself to eat. Your body will tell you when to eat and how much.  Start off with clear liquids and soup. They're easier to digest.  Next try semi-solid foods as you feel ready. These include mashed potatoes, applesauce, and gelatin.  Slowly move to solid foods. Don’t eat fatty, rich, or spicy foods at first.  Don't force yourself to have 3 large meals a day. Instead eat smaller amounts more often.  Take pain medicines with a small amount of solid food, such as crackers or toast. This helps prevent nausea.  When to call your healthcare provider  Call your healthcare provider right away if you have any of these:   You still have too much pain, or the pain gets worse, after taking the medicine. The medicine may not be strong enough. Or there may be a complication from the surgery.  You feel too sleepy, dizzy, or groggy. The medicine may be too strong.  Side effects such as nausea or vomiting. Your healthcare provider may advise taking other medicines to .  Skin changes such as rash, itching, or hives. This may mean you have an  allergic reaction. Your provider may advise taking other medicines.  The incision looks different (for instance, part of it opens up).  Bleeding or fluid leaking from the incision site, and weren't told to expect that.  Fever of 100.4°F (38°C) or higher, or as directed by your provider.  Call 911  Call 911 right away if you have:   Trouble breathing  Facial swelling    If you have obstructive sleep apnea   You were given anesthesia medicine during surgery to keep you comfortable and free of pain. After surgery, you may have more apnea spells because of this medicine and other medicines you were given. The spells may last longer than normal.    At home:  Keep using the continuous positive airway pressure (CPAP) device when you sleep. Unless your healthcare provider tells you not to, use it when you sleep, day or night. CPAP is a common device used to treat obstructive sleep apnea.  Talk with your provider before taking any pain medicine, muscle relaxants, or sedatives. Your provider will tell you about the possible dangers of taking these medicines.  Contact your provider if your sleeping changes a lot even when taking medicines as directed.  [employer] last reviewed this educational content on 10/1/2021  © 6278-4959 [employer]. All rights reserved. This information is not intended as a substitute for professional medical care. Always follow your healthcare professional's instructions.

## 2024-07-30 ENCOUNTER — TELEPHONE (OUTPATIENT)
Dept: SURGERY | Facility: CLINIC | Age: 49
End: 2024-07-30

## 2024-07-30 ENCOUNTER — HOSPITAL ENCOUNTER (OUTPATIENT)
Facility: HOSPITAL | Age: 49
Setting detail: HOSPITAL OUTPATIENT SURGERY
Discharge: HOME | End: 2024-07-30
Attending: UROLOGY | Admitting: UROLOGY
Payer: COMMERCIAL

## 2024-07-30 ENCOUNTER — ANESTHESIA (OUTPATIENT)
Dept: SURGERY | Facility: HOSPITAL | Age: 49
End: 2024-07-30
Payer: COMMERCIAL

## 2024-07-30 VITALS
HEIGHT: 63 IN | SYSTOLIC BLOOD PRESSURE: 133 MMHG | RESPIRATION RATE: 18 BRPM | HEART RATE: 97 BPM | BODY MASS INDEX: 43.59 KG/M2 | DIASTOLIC BLOOD PRESSURE: 83 MMHG | WEIGHT: 246 LBS | TEMPERATURE: 98 F | OXYGEN SATURATION: 96 %

## 2024-07-30 DIAGNOSIS — N20.0 NEPHROLITHIASIS: Primary | ICD-10-CM

## 2024-07-30 LAB
B-HCG UR QL: NEGATIVE
GLUCOSE BLDC GLUCOMTR-MCNC: 122 MG/DL (ref 70–99)
GLUCOSE BLDC GLUCOMTR-MCNC: 136 MG/DL (ref 70–99)

## 2024-07-30 PROCEDURE — 52332 CYSTOSCOPY AND TREATMENT: CPT | Performed by: UROLOGY

## 2024-07-30 PROCEDURE — 0T788DZ DILATION OF BILATERAL URETERS WITH INTRALUMINAL DEVICE, VIA NATURAL OR ARTIFICIAL OPENING ENDOSCOPIC: ICD-10-PCS | Performed by: UROLOGY

## 2024-07-30 PROCEDURE — BT1D1ZZ FLUOROSCOPY OF RIGHT KIDNEY, URETER AND BLADDER USING LOW OSMOLAR CONTRAST: ICD-10-PCS | Performed by: UROLOGY

## 2024-07-30 PROCEDURE — 74420 UROGRAPHY RTRGR +-KUB: CPT | Performed by: UROLOGY

## 2024-07-30 DEVICE — URETERAL STENT
Type: IMPLANTABLE DEVICE | Site: URETER | Status: FUNCTIONAL
Brand: ASCERTA™

## 2024-07-30 RX ORDER — KETOROLAC TROMETHAMINE 10 MG/1
10 TABLET, FILM COATED ORAL EVERY 6 HOURS PRN
Qty: 5 TABLET | Refills: 0 | Status: SHIPPED | OUTPATIENT
Start: 2024-07-30 | End: 2024-08-01

## 2024-07-30 RX ORDER — SODIUM CHLORIDE, SODIUM LACTATE, POTASSIUM CHLORIDE, CALCIUM CHLORIDE 600; 310; 30; 20 MG/100ML; MG/100ML; MG/100ML; MG/100ML
INJECTION, SOLUTION INTRAVENOUS CONTINUOUS
Status: DISCONTINUED | OUTPATIENT
Start: 2024-07-30 | End: 2024-07-30

## 2024-07-30 RX ORDER — NICOTINE POLACRILEX 4 MG
15 LOZENGE BUCCAL
Status: DISCONTINUED | OUTPATIENT
Start: 2024-07-30 | End: 2024-07-30

## 2024-07-30 RX ORDER — ROCURONIUM BROMIDE 10 MG/ML
INJECTION, SOLUTION INTRAVENOUS AS NEEDED
Status: DISCONTINUED | OUTPATIENT
Start: 2024-07-30 | End: 2024-07-30 | Stop reason: SURG

## 2024-07-30 RX ORDER — DEXTROSE MONOHYDRATE 25 G/50ML
50 INJECTION, SOLUTION INTRAVENOUS
Status: DISCONTINUED | OUTPATIENT
Start: 2024-07-30 | End: 2024-07-30

## 2024-07-30 RX ORDER — ONDANSETRON 2 MG/ML
INJECTION INTRAMUSCULAR; INTRAVENOUS AS NEEDED
Status: DISCONTINUED | OUTPATIENT
Start: 2024-07-30 | End: 2024-07-30 | Stop reason: SURG

## 2024-07-30 RX ORDER — SULFAMETHOXAZOLE AND TRIMETHOPRIM 800; 160 MG/1; MG/1
1 TABLET ORAL 2 TIMES DAILY
Qty: 6 TABLET | Refills: 0 | Status: SHIPPED | OUTPATIENT
Start: 2024-07-30 | End: 2024-08-01

## 2024-07-30 RX ORDER — FAMOTIDINE 10 MG/ML
20 INJECTION, SOLUTION INTRAVENOUS ONCE
Status: COMPLETED | OUTPATIENT
Start: 2024-07-30 | End: 2024-07-30

## 2024-07-30 RX ORDER — HYDROMORPHONE HYDROCHLORIDE 1 MG/ML
0.2 INJECTION, SOLUTION INTRAMUSCULAR; INTRAVENOUS; SUBCUTANEOUS EVERY 5 MIN PRN
Status: DISCONTINUED | OUTPATIENT
Start: 2024-07-30 | End: 2024-07-30

## 2024-07-30 RX ORDER — PHENAZOPYRIDINE HYDROCHLORIDE 100 MG/1
100 TABLET, FILM COATED ORAL ONCE
Status: COMPLETED | OUTPATIENT
Start: 2024-07-30 | End: 2024-07-30

## 2024-07-30 RX ORDER — METOCLOPRAMIDE 10 MG/1
10 TABLET ORAL ONCE
Status: COMPLETED | OUTPATIENT
Start: 2024-07-30 | End: 2024-07-30

## 2024-07-30 RX ORDER — HYDROMORPHONE HYDROCHLORIDE 1 MG/ML
0.4 INJECTION, SOLUTION INTRAMUSCULAR; INTRAVENOUS; SUBCUTANEOUS EVERY 5 MIN PRN
Status: DISCONTINUED | OUTPATIENT
Start: 2024-07-30 | End: 2024-07-30

## 2024-07-30 RX ORDER — METOCLOPRAMIDE HYDROCHLORIDE 5 MG/ML
10 INJECTION INTRAMUSCULAR; INTRAVENOUS ONCE
Status: COMPLETED | OUTPATIENT
Start: 2024-07-30 | End: 2024-07-30

## 2024-07-30 RX ORDER — KETOROLAC TROMETHAMINE 10 MG/1
10 TABLET, FILM COATED ORAL EVERY 8 HOURS PRN
Qty: 5 TABLET | Refills: 0 | Status: SHIPPED | OUTPATIENT
Start: 2024-07-30 | End: 2024-08-01

## 2024-07-30 RX ORDER — MIDAZOLAM HYDROCHLORIDE 1 MG/ML
INJECTION INTRAMUSCULAR; INTRAVENOUS AS NEEDED
Status: DISCONTINUED | OUTPATIENT
Start: 2024-07-30 | End: 2024-07-30 | Stop reason: SURG

## 2024-07-30 RX ORDER — NALOXONE HYDROCHLORIDE 0.4 MG/ML
0.08 INJECTION, SOLUTION INTRAMUSCULAR; INTRAVENOUS; SUBCUTANEOUS AS NEEDED
Status: DISCONTINUED | OUTPATIENT
Start: 2024-07-30 | End: 2024-07-30

## 2024-07-30 RX ORDER — SULFAMETHOXAZOLE AND TRIMETHOPRIM 800; 160 MG/1; MG/1
1 TABLET ORAL 2 TIMES DAILY
Qty: 6 TABLET | Refills: 0 | Status: SHIPPED | OUTPATIENT
Start: 2024-07-30 | End: 2024-07-30

## 2024-07-30 RX ORDER — IOPAMIDOL 612 MG/ML
INJECTION, SOLUTION INTRATHECAL AS NEEDED
Status: DISCONTINUED | OUTPATIENT
Start: 2024-07-30 | End: 2024-07-30 | Stop reason: HOSPADM

## 2024-07-30 RX ORDER — ACETAMINOPHEN 500 MG
1000 TABLET ORAL ONCE
Status: COMPLETED | OUTPATIENT
Start: 2024-07-30 | End: 2024-07-30

## 2024-07-30 RX ORDER — LIDOCAINE HYDROCHLORIDE 10 MG/ML
INJECTION, SOLUTION EPIDURAL; INFILTRATION; INTRACAUDAL; PERINEURAL AS NEEDED
Status: DISCONTINUED | OUTPATIENT
Start: 2024-07-30 | End: 2024-07-30 | Stop reason: SURG

## 2024-07-30 RX ORDER — FAMOTIDINE 20 MG/1
20 TABLET, FILM COATED ORAL ONCE
Status: COMPLETED | OUTPATIENT
Start: 2024-07-30 | End: 2024-07-30

## 2024-07-30 RX ORDER — NICOTINE POLACRILEX 4 MG
30 LOZENGE BUCCAL
Status: DISCONTINUED | OUTPATIENT
Start: 2024-07-30 | End: 2024-07-30

## 2024-07-30 RX ORDER — KETOROLAC TROMETHAMINE 15 MG/ML
15 INJECTION, SOLUTION INTRAMUSCULAR; INTRAVENOUS ONCE
Status: COMPLETED | OUTPATIENT
Start: 2024-07-30 | End: 2024-07-30

## 2024-07-30 RX ADMIN — LIDOCAINE HYDROCHLORIDE 50 MG: 10 INJECTION, SOLUTION EPIDURAL; INFILTRATION; INTRACAUDAL; PERINEURAL at 08:55:00

## 2024-07-30 RX ADMIN — ROCURONIUM BROMIDE 5 MG: 10 INJECTION, SOLUTION INTRAVENOUS at 09:05:00

## 2024-07-30 RX ADMIN — SODIUM CHLORIDE, SODIUM LACTATE, POTASSIUM CHLORIDE, CALCIUM CHLORIDE: 600; 310; 30; 20 INJECTION, SOLUTION INTRAVENOUS at 09:52:00

## 2024-07-30 RX ADMIN — ROCURONIUM BROMIDE 5 MG: 10 INJECTION, SOLUTION INTRAVENOUS at 08:55:00

## 2024-07-30 RX ADMIN — MIDAZOLAM HYDROCHLORIDE 2 MG: 1 INJECTION INTRAMUSCULAR; INTRAVENOUS at 08:48:00

## 2024-07-30 RX ADMIN — ONDANSETRON 4 MG: 2 INJECTION INTRAMUSCULAR; INTRAVENOUS at 09:21:00

## 2024-07-30 NOTE — H&P
PRE-OP NOTE     NAME/MRN/PROCEDURE: Kay Tracy - cystoscopy, right urs/ll/stent  HPI: 49F with obstructing stone 5mm right proximal who was counseled on definitive ureteroscopy versus Met. She opted for URS.  PMH: Rhinitis, asthma, diabetes, eczema, high blood pressure, high cholesterol, infertility, obesity, PCOS, shoulder tendinitis  PSH: Adenoidectomy, , carpal tunnel, cataract, D&C, laparoscopic appendectomy, reduction of large breast, tonsillectomy  MEDS: Atorvastatin, continuous glucose sensor, cyclobenzaprine, dapagliflozin, flu sick denied, hydrocodone acetaminophen, insulin, Toradol, levonorgestrel, lisinopril, meclizine, metformin, ondansetron, Ozempic, tamsulosin, trazodone  ALL: Cat dander, iodine, seasonal, nickel  LABS: No growth, WBC 11.1, Cr 0.75,   IMAGINcm    OTHER: CONSTITUTIONAL: No apparent distress, cooperative and communicative  NEUROLOGIC: Alert and oriented   HEAD: Normocephalic, atraumatic   EYES: Sclera non-icteric   ENT: Hearing intact, moist mucous membranes   NECK: No obvious goiter or masses   RESPIRATORY: Normal respiratory effort, Nonlabored breathing on room air  SKIN: No evident rashes   ABDOMEN: Soft, nontender, nondistended, no rebound tenderness, no guarding, no masses   ABX: ancef     FAMILY HISTORY:  Family History         Family History   Problem Relation Age of Onset    Hypertension Father      Obesity Father      Cancer Paternal Grandfather           Esophageal cancer    Diabetes Paternal Grandmother      Stroke Paternal Grandmother      Cancer Son           Medulloblastoma    Glaucoma Neg      Macular degeneration Neg              SOCIAL HISTORY:  Short Social Hx on File[]Expand by Default   Social History            Socioeconomic History    Marital status:     Number of children: 1   Occupational History    Occupation: speech therapist   Tobacco Use    Smoking status: Never    Smokeless tobacco: Never   Vaping Use    Vaping status: Never Used    Substance and Sexual Activity    Alcohol use: Never    Drug use: Never    Sexual activity: Yes   Other Topics Concern    Caffeine Concern Yes       Comment: soda-1 cup/day    Reaction to local anesthetic No    Pt has a pacemaker No    Pt has a defibrillator No

## 2024-07-30 NOTE — OPERATIVE REPORT
OPERATIVE REPORT  DATE OF SURGERY: 7/30/2024  PREOPERATIVE DIAGNOSIS: right ureteral stone  POSTOPERATIVE DIAGNOSIS: same  PROCEDURE: Cystoscopy, right ureteroscopy, right retrograde pyelogram, ureteral dilation, placement of a 6 x 26cm stent INTERNAL  SURGEON: Raquel Higgins MD  ASSISTANT: none  ANESTHESIA: general  FLUIDS: per anesthesia  URINE OUTPUT: n/a  ESTIMATED BLOOD LOSS: 3cc  SPECIMENS: none  CONDITION: Stable to PACU    INDICATIONS: right ureteral stone  PROCEDURE: The patient was taken to the operating room and given general anesthesia and then placed in the [facility]. ?Patient received ancef IV antibiotic coverage before starting the case.    At this point, cystoscopy was performed using a 22-Romansh cystoscope. Once in the bladder, A 0.038 inch Sensor wire was passed into the right ureter and then snapped to the drape as a safety wire. A second wire was passed into the kidney through the open-ended.  We then used a Karl catheter over one of the wires to dilate the ureter however it would not pass beyond the pelvic inlet.  We then used the inner portion of the 1214 access sheath which would also not pass.  At this point, the semirigid ureteroscope was brought in. It was passed alongside the wire up into the distal ureter.  There was no obvious stricture however there was a narrow area of ureter that would not allow the scope to pass despite gentle manipulation.  We tried real eroding the scope over this narrow area however it would not pass.  A second wire was then passed through the scope into the kidney.  We attempted to dilate this area with the 1214 Karl in the 1214 access sheath however it would not pass.  Therefore decision was made to place a stent.  A retrograde pyelogram was performed which demonstrated delicate calyces and a dilated proximal ureter.      We placed a 6 x 26 cm stent over the safety wire under direct cystoscopic guidance with a good proximal coil confirmed  on fluorscopy and a distal coil that was seen directly and on fluoroscopic visualization. A string was not left.    The ureteroscope was removed. The patient was then awakened and transported to the PACU in good condition.    IMPRESSION: Status post right ureteroscopy with stent placement only as we were unable to pass beyond the pelvic inlet which was extremely tight.  We will of the stent in place for about 3 to 4 weeks prior to repeat ureteroscopy to treat the stone.    PLAN:  Stent for 3 to 4 weeks  Tamsulosin, Toradol, antibiotics

## 2024-07-30 NOTE — TELEPHONE ENCOUNTER
Medication that was to be prescribed for patient today did not make it to the pharmacy. Patient  called to advise he just left pharmacy and it was not there. Please advise.

## 2024-07-30 NOTE — TELEPHONE ENCOUNTER
Received call from patient - still unable to get meds as pharmacy says order did not come through.   Orders placed once more to same pharmacy per preference.   She has ongoing pain and nausea, she will seek ED care if does not improve or any fevers.

## 2024-07-30 NOTE — PROGRESS NOTES
[facility] Pharmacy Dosing Service  Antibiotic Dosing    Kay Tracy is a 49 year old for whom pharmacy is dosing Ancef for  surgical prophylaxis.     Allergies: is allergic to cat dander [dander], iodine (topical), seasonal, and nickel.    Vitals: Ht 1.6 m (5' 3\")   Wt 111.1 kg (245 lb)   BMI 43.40 kg/m²     Labs:   WBC   Date Value Ref Range Status   07/16/2024 11.1 (H) 4.0 - 11.0 x10(3) uL Final     Creatinine   Date Value Ref Range Status   07/16/2024 0.75 0.55 - 1.02 mg/dL Final     BUN   Date Value Ref Range Status   07/16/2024 13 9 - 23 mg/dL Final       CrCl: CrCl cannot be calculated (Patient's most recent lab result is older than the maximum 7 days allowed.).      Assessment/Plan: Ancef 2g IV once pre-operatively    Pharmacy will continue to follow.  We appreciate the opportunity to assist in the care of this patient.    Thank you,   Cheyanne Fonseca, PharmD  7/30/2024  7:48 AM

## 2024-07-30 NOTE — TELEPHONE ENCOUNTER
[facility] states [facility] still does not have her medications prescribed today. See earlier 7/30 telephone encounter. Please call.      Current Outpatient Medications:     sulfamethoxazole-trimethoprim -160 MG Oral Tab per tablet, Take 1 tablet by mouth 2 (two) times daily for 3 days., Disp: 6 tablet, Rfl: 0    Ketorolac Tromethamine 10 MG Oral Tab, Take 1 tablet (10 mg total) by mouth every 6 (six) hours as needed for Pain., Disp: 5 tablet, Rfl: 0

## 2024-07-30 NOTE — TELEPHONE ENCOUNTER
- s/w pt; pt identity verified with name and   - pt states that she is in need of pain medication and it was not sent, only abx  - reviewed pt's medication list and noted that abx was sent at 9:03, and the Toradol was sent at 9:54, receipt of both confirmed by pharmacy (see below)  - tamsulosin was sent on 24, confirmed with pt that she already picked this medication up     Operative note  PLAN:  Stent for 3 to 4 weeks  Tamsulosin, Toradol, antibiotics    Outpatient Medication Detail     Disp Refills Start End    sulfamethoxazole-trimethoprim -160 MG Oral Tab per tablet 6 tablet 0 2024    Sig - Route: Take 1 tablet by mouth 2 (two) times daily for 3 days. - Oral    Sent to pharmacy as: Sulfamethoxazole-Trimethoprim 800-160 MG Oral Tablet (Bactrim DS)    E-Prescribing Status: Receipt confirmed by pharmacy (2024  9:03 AM CDT)      Pharmacy    [place] [id number] [place], [place] - [address] [phone], [phone]       Outpatient Medication Detail     Disp Refills Start End    Ketorolac Tromethamine 10 MG Oral Tab 5 tablet 0 2024 --    Sig - Route: Take 1 tablet (10 mg total) by mouth every 6 (six) hours as needed for Pain. - Oral    Sent to pharmacy as: Ketorolac Tromethamine 10 MG Oral Tablet (TORADOL)    E-Prescribing Status: Receipt confirmed by pharmacy (2024  9:54 AM CDT)

## 2024-07-30 NOTE — ANESTHESIA POSTPROCEDURE EVALUATION
Patient: Kay Tracy    Procedure Summary       Date: 07/30/24 Room / Location: [facility] MAIN OR  / [facility] MAIN OR    Anesthesia Start: 0848 Anesthesia Stop: 0951    Procedures:       Cystoscopy, right ureteroscopy,  right retrograde pyelogram, right stent placement, ureteral dilation (Right)      Cystoscopy retrograde (Right)      Cystoscopy stent insertion (Right) Diagnosis:       Nephrolithiasis      (Nephrolithiasis [N20.0])    Surgeons: Raquel Higgins MD Anesthesiologist: Minerva Graham MD    Anesthesia Type: general ASA Status: 3            Anesthesia Type: general    Vitals Value Taken Time   BP  07/30/24 0951   Temp 97.4 07/30/24 0951   Pulse 100 07/30/24 0950   Resp 19 07/30/24 0950   SpO2 97 % 07/30/24 0950   Vitals shown include unfiled device data.    [facility] AN Post Evaluation:   Patient Evaluated in PACU  Patient Participation: complete - patient participated  Level of Consciousness: awake  Pain Score: 0  Pain Management: adequate  Airway Patency:patent  Dental exam unchanged from preop  Yes    Nausea/Vomiting: none  Cardiovascular Status: acceptable  Respiratory Status: acceptable  Postoperative Hydration acceptable      Minerva Graham MD  7/30/2024 9:51 AM

## 2024-07-30 NOTE — TELEPHONE ENCOUNTER
Hi!  This patient needs a ureteroscopy. Should be booked for 1 hours in the next 3-4 weeks, no sooner than 3 weeks. 3 weeks would be ideal. Needs labs as ordered below.    Thanks!  AR    Urology Surgery Scheduling Request    Location: [facility]    Surgeon: JESÚS Higgins MD    Asst. Surgeon: N/A    Diagnosis: Nephrolithiasis    Procedure: Cystoscopy right ureteroscopy, laser lithotripsy, retrograde pyelogram, stent exchange     Anesthesia: General     Time Frame: 3-4 weeks    Time needed: 1.5 hours    Special Equipment: Holmium Laser    On Call to OR: ampicillin and ceftriaxone    Admission: Day Surgery    Pre-op Testing: just a urine culture 10 days prior     Need Pre-op Clearance: none    Estimated Post Op/Follow Up Appt: tbd.    Raquel Higgins MD

## 2024-07-30 NOTE — ANESTHESIA PREPROCEDURE EVALUATION
Anesthesia PreOp Note    HPI:     Kay Tracy is a 49 year old female who presents for preoperative consultation requested by: Raquel Higgins MD    Date of Surgery: 7/30/2024    Procedure(s):  Cystoscopy right ureteroscopy, laser lithotripsy, right retrograde pyelogram, right stent placement  Cystoscopy retrograde  Cystoscopy with holmium laser  Cystoscopy stent insertion  Indication: Nephrolithiasis [N20.0]    Relevant Problems   No relevant active problems       NPO:  Last Liquid Consumption Date: 07/29/24  Last Liquid Consumption Time: 2000  Last Solid Consumption Date: 07/29/24  Last Solid Consumption Time: 2000  Last Liquid Consumption Date: 07/29/24          History Review:  Patient Active Problem List    Diagnosis Date Noted    Posterior subcapsular age-related cataract of right eye 04/25/2023    Age-related nuclear cataract, left 04/25/2023    Diabetes mellitus type 2 without retinopathy (HCC) 04/25/2023    Anxiety 01/05/2023    Left foot pain 05/10/2022    Achilles tendinitis, left leg 09/12/2018    Hand eczema 06/02/2018    Aftercare following surgery of the musculoskeletal system 02/27/2018    Left carpal tunnel syndrome 01/26/2018    Intrinsic eczema 01/03/2018    Dyslipidemia 10/31/2017    Excessive bleeding in premenopausal period 04/13/2017    Post-operative state 04/13/2017    Cubital tunnel syndrome on left 01/15/2016    S/P appendectomy 03/09/2015    Shoulder pain 02/12/2015    Type II or unspecified type diabetes mellitus without mention of complication, uncontrolled 02/07/2014    Other and unspecified hyperlipidemia 02/07/2014    PCOS (polycystic ovarian syndrome) 08/09/2011       Past Medical History:    Allergic rhinitis    Asthma    allergy induced    Carpal tunnel syndrome    Diabetes (HCC)    diabetes for 2011    Eczema    2-3 years ago, only on right fingers    Essential hypertension    Normal BP readings at home    GDM (gestational diabetes mellitus) (HCC)    High blood  pressure    High cholesterol    HORMONE DISORDER    PCOS    Infertility female    Obesity    Other and unspecified hyperlipidemia    PCOS (polycystic ovarian syndrome)    Seasonal allergies    Shoulder tendonitis    Type II or unspecified type diabetes mellitus without mention of complication, uncontrolled    Visual impairment    glasses       Past Surgical History:   Procedure Laterality Date    Adenoidectomy        ,    Carpal tunnel release Right     Cataract      Cataract extraction w/  intraocular lens implant Right 2023    Dr. Ny @ [facility]    Colonoscopy N/A 02/15/2022    Procedure: COLONOSCOPY;  Surgeon: Bobby Ann MD;  Location: [facility] ENDOSCOPY    D & c  ?    Laparoscopic appendectomy  2015    Other surgical history      IVF    Reduction of large breast      Tonsillectomy         Medications Prior to Admission   Medication Sig Dispense Refill Last Dose    [] ondansetron 4 MG Oral Tablet Dispersible Take 1 tablet (4 mg total) by mouth every 4 to 6 hours as needed for Nausea. 10 tablet 0 2024    tamsulosin 0.4 MG Oral Cap Take 1 capsule (0.4 mg total) by mouth daily. Take 1/2 hour following the same meal each day 30 capsule 0 2024    insulin regular human, conc, (HUMULIN R U-500 KWIKPEN) 500 UNIT/ML Subcutaneous Solution Pen-injector Inject 0.24 mL (120 Units total) into the skin daily with breakfast AND 0.24 mL (120 Units total) daily with lunch AND 0.25 mL (125 Units total) daily with dinner. 24 mL 2 2024    metFORMIN  MG Oral Tablet 24 Hr TAKE FOUR TABLETS BY MOUTH DAILY WITH BREAKFAST. 360 tablet 3 2024    dapagliflozin (FARXIGA) 10 MG Oral Tab Take 1 tablet (10 mg total) by mouth daily. 90 tablet 1 2024    lisinopril 5 MG Oral Tab Take 1 tablet (5 mg total) by mouth daily. 90 tablet 3 2024    atorvastatin 40 MG Oral Tab TAKE 1 TABLET BY MOUTH EVERY DAY AT NIGHT 90 tablet 3 2024    OZEMPIC, 1 MG/DOSE, 4  MG/3ML Subcutaneous Solution Pen-injector INJECT 1MG INTO THE SKIN ONCE A WEEK (Patient taking differently: Inject 1 mg into the skin Every Monday.) 9 mL 1 2024    Insulin Pen Needle 32G X 4 MM Does not apply Misc Inject 3x daily 300 each 0 2024    [] Ketorolac Tromethamine 10 MG Oral Tab Take 1 tablet (10 mg total) by mouth every 6 (six) hours as needed. 28 tablet 0     [] HYDROcodone-acetaminophen 5-325 MG Oral Tab Take 1 tablet by mouth every 6 (six) hours as needed for Pain. 10 tablet 0 2024    [] tobramycin 0.3 % Ophthalmic Solution Place 1-2 drops into both eyes every 6 (six) hours for 5 days. 1 each 0 More than a month    Ciclopirox 8 % External Solution Apply 1 mL topically nightly. 6.6 mL 1 More than a month    cyclobenzaprine 5 MG Oral Tab Take 1 tablet (5 mg total) by mouth 3 (three) times daily as needed for Muscle spasms. 20 tablet 0 More than a month    Continuous Glucose Sensor (DEXCOM G7 SENSOR) Does not apply Misc 1 each Every 10 days. 9 each 1     traZODone 50 MG Oral Tab Take 1 tablet (50 mg total) by mouth nightly as needed for Sleep. 90 tablet 2 More than a month    fluocinonide 0.05 % External Cream Use twice daily on affected areas, itchy bumps on hands 60 g 3 More than a month    meclizine 25 MG Oral Tab Take 1 tablet (25 mg total) by mouth 3 (three) times daily as needed for Dizziness. 30 tablet 0 More than a month    levonorgestrel 20 MCG/24HR Intrauterine IUD Mirena 20 mcg/24 hr (5 years) intrauterine device   Take by intrauterine route.        Current Facility-Administered Medications Ordered in Epic   Medication Dose Route Frequency Provider Last Rate Last Admin    lactated ringers infusion   Intravenous Continuous Raquel Higgins MD        acetaminophen (Tylenol Extra Strength) tab 1,000 mg  1,000 mg Oral Once Raquel Higgins MD        famotidine (Pepcid) tab 20 mg  20 mg Oral Once Raquel Higgins MD        Or    famotidine (Pepcid) 20 mg/2mL  injection 20 mg  20 mg Intravenous Once Raquel Higgins MD        metoclopramide (Reglan) tab 10 mg  10 mg Oral Once Raquel Higgins MD        Or    metoclopramide (Reglan) 5 mg/mL injection 10 mg  10 mg Intravenous Once Raquel Higgins MD        ceFAZolin (Ancef) 2g in 10mL IV syringe premix  2 g Intravenous Once Raquel Higgins MD         No current Epic-ordered outpatient medications on file.       Allergies   Allergen Reactions    Cat Dander [Dander]     Iodine (Topical)     Seasonal     Nickel RASH       Family History   Problem Relation Age of Onset    Hypertension Father     Obesity Father     Cancer Paternal Grandfather         Esophageal cancer    Diabetes Paternal Grandmother     Stroke Paternal Grandmother     Cancer Son         Medulloblastoma    Glaucoma Neg     Macular degeneration Neg      Social History     Socioeconomic History    Marital status:     Number of children: 1   Occupational History    Occupation: speech therapist   Tobacco Use    Smoking status: Never    Smokeless tobacco: Never   Vaping Use    Vaping status: Never Used   Substance and Sexual Activity    Alcohol use: Never    Drug use: Never    Sexual activity: Yes   Other Topics Concern    Caffeine Concern Yes     Comment: soda-1 cup/day    Reaction to local anesthetic No    Pt has a pacemaker No    Pt has a defibrillator No       Available pre-op labs reviewed.  Lab Results   Component Value Date    WBC 11.1 (H) 07/16/2024    RBC 5.22 07/16/2024    HGB 15.3 07/16/2024    HCT 45.9 07/16/2024    MCV 87.9 07/16/2024    MCH 29.3 07/16/2024    MCHC 33.3 07/16/2024    RDW 13.2 07/16/2024    .0 07/16/2024    URINEPREG Negative 07/30/2024     Lab Results   Component Value Date     07/16/2024    K 3.8 07/16/2024     07/16/2024    CO2 25.0 07/16/2024    BUN 13 07/16/2024    CREATSERUM 0.75 07/16/2024     (H) 07/16/2024    CA 9.0 07/16/2024          Vital Signs:  Body mass index is 43.58 kg/m².    height is 1.6 m (5' 3\") and weight is 111.6 kg (246 lb). Her oral temperature is 97.9 °F (36.6 °C). Her blood pressure is 125/68 and her pulse is 92. Her respiration is 20 and oxygen saturation is 98%.   Vitals:    07/22/24 1045 07/30/24 0751   BP:  125/68   Pulse:  92   Resp:  20   Temp:  97.9 °F (36.6 °C)   TempSrc:  Oral   SpO2:  98%   Weight: 111.1 kg (245 lb) 111.6 kg (246 lb)   Height: 1.6 m (5' 3\") 1.6 m (5' 3\")        Anesthesia Evaluation     Patient summary reviewed and Nursing notes reviewed    Airway   Mallampati: III  TM distance: >3 FB  Neck ROM: full  Dental      Pulmonary     breath sounds clear to auscultation  (+) asthma  Cardiovascular   (+) hypertension    Rhythm: regular  Rate: normal    Neuro/Psych    (+)  neuromuscular disease, anxiety/panic attacks,        GI/Hepatic/Renal      Endo/Other    (+) diabetes mellitus  Abdominal                  Anesthesia Plan:   ASA:  3  Plan:   General  Airway:  ETT  Informed Consent Plan and Risks Discussed With:  Patient  Discussed plan with:  Attending      I have informed Kay Tracy and/or legal guardian or family member of the nature of the anesthetic plan, benefits, risks including possible dental damage if relevant, major complications, and any alternative forms of anesthetic management.   All of the patient's questions were answered to the best of my ability. The patient desires the anesthetic management as planned.  Minerva rGaham MD  7/30/2024 8:01 AM  Present on Admission:  **None**

## 2024-07-30 NOTE — ANESTHESIA PROCEDURE NOTES
Airway  Date/Time: 7/30/2024 9:02 AM  Urgency: elective    Airway not difficult    General Information and Staff    Patient location during procedure: OR  Anesthesiologist: Minerva Graham MD  Performed: anesthesiologist   Performed by: Minerva Graham MD  Authorized by: Minerva Graham MD      Indications and Patient Condition  Indications for airway management: anesthesia  Sedation level: deep  Preoxygenated: yes  Patient position: sniffing  Mask difficulty assessment: 1 - vent by mask    Final Airway Details  Final airway type: endotracheal airway      Successful airway: ETT  Cuffed: yes   Successful intubation technique: Video laryngoscopy  Endotracheal tube insertion site: oral  Blade: GlideScope  Blade size: #3  ETT size (mm): 7.0    Cormack-Lehane Classification: grade I - full view of glottis  Placement verified by: capnometry   Measured from: lips  Number of attempts at approach: 1  Number of other approaches attempted: 1    Other Attempts  Unsuccessful attempted endotracheal techniques: video laryngoscopy    Additional Comments  First Glydescope malfunction . Intubated with the second one

## 2024-07-31 ENCOUNTER — APPOINTMENT (OUTPATIENT)
Dept: ULTRASOUND IMAGING | Facility: HOSPITAL | Age: 49
End: 2024-07-31
Attending: EMERGENCY MEDICINE
Payer: COMMERCIAL

## 2024-07-31 ENCOUNTER — HOSPITAL ENCOUNTER (OUTPATIENT)
Facility: HOSPITAL | Age: 49
Setting detail: OBSERVATION
Discharge: HOME | End: 2024-08-01
Attending: EMERGENCY MEDICINE | Admitting: HOSPITALIST
Payer: COMMERCIAL

## 2024-07-31 DIAGNOSIS — N20.0 KIDNEY STONE ON RIGHT SIDE: ICD-10-CM

## 2024-07-31 DIAGNOSIS — R52 INTRACTABLE PAIN: ICD-10-CM

## 2024-07-31 DIAGNOSIS — N20.0 RIGHT KIDNEY STONE: Primary | ICD-10-CM

## 2024-07-31 DIAGNOSIS — Z96.0 URETERAL STENT PRESENT: ICD-10-CM

## 2024-07-31 PROBLEM — E87.20 METABOLIC ACIDOSIS: Status: ACTIVE | Noted: 2024-07-31

## 2024-07-31 LAB
ANION GAP SERPL CALC-SCNC: 10 MMOL/L (ref 0–18)
BASOPHILS # BLD AUTO: 0.02 X10(3) UL (ref 0–0.2)
BASOPHILS NFR BLD AUTO: 0.2 %
BUN BLD-MCNC: 12 MG/DL (ref 9–23)
BUN/CREAT SERPL: 15.4 (ref 10–20)
CALCIUM BLD-MCNC: 9 MG/DL (ref 8.7–10.4)
CHLORIDE SERPL-SCNC: 106 MMOL/L (ref 98–112)
CO2 SERPL-SCNC: 21 MMOL/L (ref 21–32)
CREAT BLD-MCNC: 0.78 MG/DL
DEPRECATED RDW RBC AUTO: 41.9 FL (ref 35.1–46.3)
EGFRCR SERPLBLD CKD-EPI 2021: 94 ML/MIN/1.73M2 (ref 60–?)
EOSINOPHIL # BLD AUTO: 0 X10(3) UL (ref 0–0.7)
EOSINOPHIL NFR BLD AUTO: 0 %
ERYTHROCYTE [DISTWIDTH] IN BLOOD BY AUTOMATED COUNT: 13.1 % (ref 11–15)
GLUCOSE BLD-MCNC: 270 MG/DL (ref 70–99)
GLUCOSE BLDC GLUCOMTR-MCNC: 133 MG/DL (ref 70–99)
GLUCOSE BLDC GLUCOMTR-MCNC: 163 MG/DL (ref 70–99)
GLUCOSE BLDC GLUCOMTR-MCNC: 174 MG/DL (ref 70–99)
GLUCOSE BLDC GLUCOMTR-MCNC: 188 MG/DL (ref 70–99)
HCT VFR BLD AUTO: 43.9 %
HGB BLD-MCNC: 14.6 G/DL
IMM GRANULOCYTES # BLD AUTO: 0.05 X10(3) UL (ref 0–1)
IMM GRANULOCYTES NFR BLD: 0.4 %
LYMPHOCYTES # BLD AUTO: 1.32 X10(3) UL (ref 1–4)
LYMPHOCYTES NFR BLD AUTO: 10.2 %
MCH RBC QN AUTO: 29.1 PG (ref 26–34)
MCHC RBC AUTO-ENTMCNC: 33.3 G/DL (ref 31–37)
MCV RBC AUTO: 87.6 FL
MONOCYTES # BLD AUTO: 0.75 X10(3) UL (ref 0.1–1)
MONOCYTES NFR BLD AUTO: 5.8 %
NEUTROPHILS # BLD AUTO: 10.84 X10 (3) UL (ref 1.5–7.7)
NEUTROPHILS # BLD AUTO: 10.84 X10(3) UL (ref 1.5–7.7)
NEUTROPHILS NFR BLD AUTO: 83.4 %
OSMOLALITY SERPL CALC.SUM OF ELEC: 293 MOSM/KG (ref 275–295)
PLATELET # BLD AUTO: 250 10(3)UL (ref 150–450)
POTASSIUM SERPL-SCNC: 4.1 MMOL/L (ref 3.5–5.1)
RBC # BLD AUTO: 5.01 X10(6)UL
RBC #/AREA URNS AUTO: >10 /HPF
SODIUM SERPL-SCNC: 137 MMOL/L (ref 136–145)
SP GR UR REFRACTOMETRY: 1.03 (ref 1–1.03)
WBC # BLD AUTO: 13 X10(3) UL (ref 4–11)
WBC CLUMPS UR QL AUTO: PRESENT /HPF

## 2024-07-31 PROCEDURE — 99232 SBSQ HOSP IP/OBS MODERATE 35: CPT | Performed by: UROLOGY

## 2024-07-31 PROCEDURE — 99213 OFFICE O/P EST LOW 20 MIN: CPT

## 2024-07-31 PROCEDURE — 76770 US EXAM ABDO BACK WALL COMP: CPT | Performed by: EMERGENCY MEDICINE

## 2024-07-31 PROCEDURE — 99222 1ST HOSP IP/OBS MODERATE 55: CPT | Performed by: INTERNAL MEDICINE

## 2024-07-31 RX ORDER — ONDANSETRON 2 MG/ML
4 INJECTION INTRAMUSCULAR; INTRAVENOUS EVERY 6 HOURS PRN
Status: DISCONTINUED | OUTPATIENT
Start: 2024-07-31 | End: 2024-07-31

## 2024-07-31 RX ORDER — PROCHLORPERAZINE EDISYLATE 5 MG/ML
5 INJECTION INTRAMUSCULAR; INTRAVENOUS EVERY 8 HOURS PRN
Status: DISCONTINUED | OUTPATIENT
Start: 2024-07-31 | End: 2024-08-01

## 2024-07-31 RX ORDER — HEPARIN SODIUM 5000 [USP'U]/ML
5000 INJECTION, SOLUTION INTRAVENOUS; SUBCUTANEOUS EVERY 8 HOURS SCHEDULED
Status: DISCONTINUED | OUTPATIENT
Start: 2024-07-31 | End: 2024-08-01

## 2024-07-31 RX ORDER — TAMSULOSIN HYDROCHLORIDE 0.4 MG/1
0.4 CAPSULE ORAL
Status: DISCONTINUED | OUTPATIENT
Start: 2024-07-31 | End: 2024-08-01

## 2024-07-31 RX ORDER — SODIUM CHLORIDE 9 MG/ML
INJECTION, SOLUTION INTRAVENOUS CONTINUOUS
Status: DISCONTINUED | OUTPATIENT
Start: 2024-07-31 | End: 2024-08-01

## 2024-07-31 RX ORDER — MORPHINE SULFATE 2 MG/ML
1 INJECTION, SOLUTION INTRAMUSCULAR; INTRAVENOUS EVERY 2 HOUR PRN
Status: DISCONTINUED | OUTPATIENT
Start: 2024-07-31 | End: 2024-07-31

## 2024-07-31 RX ORDER — ONDANSETRON 2 MG/ML
8 INJECTION INTRAMUSCULAR; INTRAVENOUS EVERY 4 HOURS PRN
Status: DISCONTINUED | OUTPATIENT
Start: 2024-07-31 | End: 2024-08-01

## 2024-07-31 RX ORDER — HYDROCODONE BITARTRATE AND ACETAMINOPHEN 5; 325 MG/1; MG/1
1 TABLET ORAL EVERY 4 HOURS PRN
Status: DISCONTINUED | OUTPATIENT
Start: 2024-07-31 | End: 2024-07-31

## 2024-07-31 RX ORDER — MORPHINE SULFATE 4 MG/ML
4 INJECTION, SOLUTION INTRAMUSCULAR; INTRAVENOUS EVERY 2 HOUR PRN
Status: DISCONTINUED | OUTPATIENT
Start: 2024-07-31 | End: 2024-07-31

## 2024-07-31 RX ORDER — DEXTROSE MONOHYDRATE 25 G/50ML
50 INJECTION, SOLUTION INTRAVENOUS
Status: DISCONTINUED | OUTPATIENT
Start: 2024-07-31 | End: 2024-08-01

## 2024-07-31 RX ORDER — NICOTINE POLACRILEX 4 MG
15 LOZENGE BUCCAL
Status: DISCONTINUED | OUTPATIENT
Start: 2024-07-31 | End: 2024-08-01

## 2024-07-31 RX ORDER — ONDANSETRON 2 MG/ML
4 INJECTION INTRAMUSCULAR; INTRAVENOUS ONCE
Status: COMPLETED | OUTPATIENT
Start: 2024-07-31 | End: 2024-07-31

## 2024-07-31 RX ORDER — ACETAMINOPHEN 325 MG/1
650 TABLET ORAL EVERY 4 HOURS PRN
Status: DISCONTINUED | OUTPATIENT
Start: 2024-07-31 | End: 2024-07-31

## 2024-07-31 RX ORDER — MORPHINE SULFATE 4 MG/ML
4 INJECTION, SOLUTION INTRAMUSCULAR; INTRAVENOUS ONCE
Status: COMPLETED | OUTPATIENT
Start: 2024-07-31 | End: 2024-07-31

## 2024-07-31 RX ORDER — NICOTINE POLACRILEX 4 MG
30 LOZENGE BUCCAL
Status: DISCONTINUED | OUTPATIENT
Start: 2024-07-31 | End: 2024-08-01

## 2024-07-31 RX ORDER — INSULIN DEGLUDEC 100 U/ML
20 INJECTION, SOLUTION SUBCUTANEOUS DAILY
Status: DISCONTINUED | OUTPATIENT
Start: 2024-07-31 | End: 2024-08-01

## 2024-07-31 RX ORDER — HYDROCODONE BITARTRATE AND ACETAMINOPHEN 10; 325 MG/1; MG/1
2 TABLET ORAL EVERY 4 HOURS PRN
Status: DISCONTINUED | OUTPATIENT
Start: 2024-07-31 | End: 2024-08-01

## 2024-07-31 RX ORDER — MORPHINE SULFATE 2 MG/ML
2 INJECTION, SOLUTION INTRAMUSCULAR; INTRAVENOUS EVERY 2 HOUR PRN
Status: DISCONTINUED | OUTPATIENT
Start: 2024-07-31 | End: 2024-07-31

## 2024-07-31 RX ORDER — KETOROLAC TROMETHAMINE 15 MG/ML
15 INJECTION, SOLUTION INTRAMUSCULAR; INTRAVENOUS EVERY 6 HOURS PRN
Status: DISCONTINUED | OUTPATIENT
Start: 2024-07-31 | End: 2024-08-01

## 2024-07-31 RX ORDER — HYDROCODONE BITARTRATE AND ACETAMINOPHEN 5; 325 MG/1; MG/1
2 TABLET ORAL EVERY 4 HOURS PRN
Status: DISCONTINUED | OUTPATIENT
Start: 2024-07-31 | End: 2024-07-31

## 2024-07-31 RX ORDER — KETOROLAC TROMETHAMINE 15 MG/ML
15 INJECTION, SOLUTION INTRAMUSCULAR; INTRAVENOUS ONCE
Status: COMPLETED | OUTPATIENT
Start: 2024-07-31 | End: 2024-07-31

## 2024-07-31 RX ORDER — HYDROCODONE BITARTRATE AND ACETAMINOPHEN 10; 325 MG/1; MG/1
1 TABLET ORAL EVERY 4 HOURS PRN
Status: DISCONTINUED | OUTPATIENT
Start: 2024-07-31 | End: 2024-08-01

## 2024-07-31 RX ORDER — ACETAMINOPHEN 500 MG
500 TABLET ORAL EVERY 4 HOURS PRN
Status: DISCONTINUED | OUTPATIENT
Start: 2024-07-31 | End: 2024-08-01

## 2024-07-31 NOTE — PROGRESS NOTES
[facility] Pharmacy Note: Antimicrobial Weight Based Dose Adjustment for: ceftriaxone (ROCEPHIN)    Kay Tracy is a 49 year old patient who has been prescribed ceftriaxone (ROCEPHIN) 1gm ivpb x 1 dose.    Estimated Creatinine Clearance: 73 mL/min (based on SCr of 0.78 mg/dL).  Wt Readings from Last 6 Encounters:   07/31/24 111.1 kg (245 lb)   07/30/24 111.6 kg (246 lb)   07/16/24 111.1 kg (245 lb)   07/11/24 111.1 kg (245 lb)   07/10/24 111.1 kg (245 lb)   06/12/24 112.4 kg (247 lb 12.8 oz)     Body mass index is 43.4 kg/m².    Based on this criteria and renal function, dose will be adjusted to ceftriaxone (ROCEPHIN) 2gm ivpb x 1 dose.    Thank you,    Donal Green, PharmD  7/31/2024  4:24 AM

## 2024-07-31 NOTE — CONSULTS
[facility]  part of [facility]    Urology Consult Note    History of Present Illness:   Patient is a 49 year old female with PMHx of asthma, DM, HTN, HLD, PCOS, and Nephrolithiasis presented to the ER this morning with complaint of abdominal pain, nausea, and vomiting. The patient is POD 1 of a cystoscopy, right ureteroscopy, right retrograde pyelogram, ureteral dilation, and right ureteral stent placement for right ureteral stone, with plan for definitive treatment of the stone in 3-4 weeks. After discharge, the patient began experiencing pain and nausea, unrelieved by Toradol. She was admitted for pain control and to rule out UTI.    The patient is currently resting in bed. States she is feeling a bit better, however her abdomen is . She is tolerating fluids and voiding freely. States she has not been taking much in since surgery, so feels her urine output is less.    Cr 0.78  WBC 13.0  UA shows dark brown urine that is extra turbid, 11-20 WBC, >10 RBC, Rare bacteria, and WBC clumps  Urine culture is pending    RBUS showed mild hydronephrosis in the right kidney is unchanged from the recent CT status post nephroureteral stent placement.  A 7 mm hyperechoic focus in the right renal pelvis is similar in size and location compared to the stone visible on that CT.  No perinephric fluid. Distal tip of the nephroureteral stent is not identified with certainty.  However, the bladder is suboptimally distended and difficult to assess. Stable focal cortical scarring in the superior left renal pole.     HISTORY:  Past Medical History:    Allergic rhinitis    Asthma    allergy induced    Carpal tunnel syndrome    Diabetes (HCC)    diabetes for 2011    Eczema    2-3 years ago, only on right fingers    Essential hypertension    Normal BP readings at home    GDM (gestational diabetes mellitus) ([facility])    High blood pressure    High cholesterol    HORMONE DISORDER    PCOS    Infertility female     Obesity    Other and unspecified hyperlipidemia    PCOS (polycystic ovarian syndrome)    Seasonal allergies    Shoulder tendonitis    Type II or unspecified type diabetes mellitus without mention of complication, uncontrolled    Visual impairment    glasses      Past Surgical History:   Procedure Laterality Date    Adenoidectomy  1980      ,    Carpal tunnel release Right     Cataract      Cataract extraction w/  intraocular lens implant Right 2023    Dr. Ny @ [facility]    Colonoscopy N/A 02/15/2022    Procedure: COLONOSCOPY;  Surgeon: Bobby Ann MD;  Location: [facility] ENDOSCOPY    Cystoscopy,insert ureteral stent  2024    Cystoscopy, right ureteroscopy, right retrograde pyelogram, ureteral dilation, placement stent    D & c  ?    Laparoscopic appendectomy  2015    Other surgical history      IVF    Reduction of large breast  1998    Tonsillectomy        Family History   Problem Relation Age of Onset    Hypertension Father     Obesity Father     Cancer Paternal Grandfather         Esophageal cancer    Diabetes Paternal Grandmother     Stroke Paternal Grandmother     Cancer Son         Medulloblastoma    Glaucoma Neg     Macular degeneration Neg       Social History:   Social History     Socioeconomic History    Marital status:     Number of children: 1   Occupational History    Occupation: speech therapist   Tobacco Use    Smoking status: Never    Smokeless tobacco: Never   Vaping Use    Vaping status: Never Used   Substance and Sexual Activity    Alcohol use: Never    Drug use: Never    Sexual activity: Yes   Other Topics Concern    Caffeine Concern Yes     Comment: soda-1 cup/day    Reaction to local anesthetic No    Pt has a pacemaker No    Pt has a defibrillator No     Social Determinants of Health     Food Insecurity: No Food Insecurity (2024)    Food Insecurity     Food Insecurity: Never true   Transportation Needs: No Transportation Needs  (7/31/2024)    Transportation Needs     Lack of Transportation: No   Housing Stability: Low Risk  (7/31/2024)    Housing Stability     Housing Instability: No        Allergies  Allergies   Allergen Reactions    Cat Dander [Dander]     Iodine (Topical)     Seasonal     Nickel RASH       Review of Systems:   A 10-point review of systems was completed and is negative other than as noted above.    Physical Exam:   /90 (BP Location: Right arm)   Pulse 99   Temp 98.1 °F (36.7 °C) (Oral)   Resp 20   Ht 5' 3\" (1.6 m)   Wt 245 lb (111.1 kg)   SpO2 97%   BMI 43.40 kg/m²     GENERAL APPEARANCE: well developed, well nourished, in no acute distress  NEUROLOGIC: no localizing neurologic signs, alert and oriented x 3, converses appropriately  HEAD: atraumatic, normocephalic  EYES: sclera non-icteric  ORAL CAVITY: mucosa moist  NECK/THYROID: no obvious masses or goiter  LUNGS: non-labored breathing  ABDOMEN: soft, tender to palpation on right side, nondistended  EXTREMITIES: warm, well-perfused. No clubbing, cyanosis or edema.  SKIN: no obvious rashes    Results:     Laboratory Data:  Lab Results   Component Value Date    WBC 13.0 (H) 07/31/2024    HGB 14.6 07/31/2024    .0 07/31/2024     Lab Results   Component Value Date     07/31/2024    K 4.1 07/31/2024     07/31/2024    CO2 21.0 07/31/2024    BUN 12 07/31/2024     (H) 07/31/2024    GFRAA 127 11/20/2021    AST 26 07/16/2024    ALT 28 07/16/2024    TP 7.0 07/16/2024    ALB 4.5 07/16/2024    CA 9.0 07/31/2024       Urinalysis Results (last three years):  Recent Labs     07/10/24  1500 07/16/24  0430 07/31/24  0209   COLORUR  --  Yellow Dark-Brown*   CLARITY  --  Turbid* Ex.Turbid*   SPECGRAVITY 1.025 1.029 1.030   PHURINE 6.0 5.5  --    PROUR  --  100*  --    GLUUR  --  >1000*  --    KETUR  --  Trace*  --    BILUR  --  Negative  --    BLOODURINE  --  3+*  --    NITRITE Negative Negative  --    UROBILINOGEN  --  Normal  --    LEUUR  --  75*   --    WBCUR  --  6-10* 11-20*   RBCUR  --  >10* >10*   BACUR  --  None Seen Rare*       Urine Culture Results (last three years):  Lab Results   Component Value Date    URINECUL No Growth at 18-24 hrs. 07/16/2024       Imaging  US KIDNEY/BLADDER (CPT=76770)    Result Date: 7/31/2024  PROCEDURE: US KIDNEY/BLADDER (CPT=76770)  COMPARISON: [facility], CT ABDOMEN + PELVIS (CONTRAST ONLY) (CPT=74177), 7/16/2024, 5:54 AM.  INDICATIONS: Vomiting.  Recently diagnosed right ureteral stone status post nephroureteral stent placement.  TECHNIQUE: Ultrasound examination was performed to visualize the kidneys and bladder.   FINDINGS:  RIGHT KIDNEY: Right kidney length is 12.9 cm.  Normal echotexture.  A hyperechoic nephroureteral stent is seen in the renal pelvis.  Mild hydronephrosis is unchanged from the recent CT.  A 7 mm hyperechoic focus is seen in the right renal pelvis, which is similar in size and location compared to the stone visible on the recent CT.  No mass or perirenal fluid.  LEFT KIDNEY: Left kidney length is 12.3 cm.  Normal echotexture.  There is unchanged focal cortical scarring in the superior pole laterally.  No hydronephrosis, mass, calculus or perirenal fluid.  BLADDER: The bladder is suboptimally distended and is not well assessed.  The nephroureteral stents cannot be identified with certainty in the bladder lumen.  CONCLUSION:  1. Mild hydronephrosis in the right kidney is unchanged from the recent CT status post nephroureteral stent placement.  A 7 mm hyperechoic focus in the right renal pelvis is similar in size and location compared to the stone visible on that CT.  No perinephric fluid. 2. Distal tip of the nephroureteral stent is not identified with certainty.  However, the bladder is suboptimally distended and difficult to assess. 3. Stable focal cortical scarring in the superior left renal pole.   A preliminary report was issued by the [facility] teleradiology service. There  are no major discrepancies.  ELM-REMOTE   DICTATED BY (CST): MURTAZA JEFF MD ON 7/31/2024 AT 7:31 AM     FINALIZED BY (CST): MURTAZA JEFF MD ON 7/31/2024 AT 7:34 AM             CT ABDOMEN+PELVIS(CONTRAST ONLY)(CPT=74177)    Result Date: 7/16/2024  PROCEDURE: CT ABDOMEN + PELVIS (CONTRAST ONLY) (CPT=74177)  COMPARISON: None available.  INDICATIONS: Generalized abdominal pain, worse in the right lower quadrant, with history of appendicitis.  TECHNIQUE: Multidetector CT images of the abdomen and pelvis were obtained with non-ionic intravenous contrast material. Automated exposure control for dose reduction was used. Adjustment of the mA and/or kV was done based on the patient's size. Iterative reconstruction technique for dose reduction was employed. Dose information was transmitted to the ACR (American College of Radiology) NRDR (National Radiology Data Registry), which includes the Dose Index Registry. Oral contrast was not ingested.  FINDINGS: LUNG BASES: The heart is normal in size. Mild mitral annular calcifications are observed.  There is dependent subsegmental atelectasis bilaterally. LIVER: Enlarged, measuring 21.3 cm in craniocaudal axis, and diffusely hypoattenuating with areas of relative hyperdensity adjacent to the gallbladder fossa, consistent with hepatic steatosis and areas of fatty sparing. Isolated low density adjacent to the falciform ligament is characteristic for more pronounced focal fatty change. BILIARY: The gallbladder is present. PANCREAS: No lesion, fluid collection, ductal dilatation, or atrophy.  SPLEEN: No enlargement.  ADRENALS:   No defined mass or abnormal enlargement.  KIDNEYS:   An obstructing 0.6 x 0.8 x 0.7 cm calculus (density of up to 969 Hounsfield units) is present, lodged at the right ureteropelvic junction. Right-sided hydronephrosis is seen. Mild right-sided urothelial enhancement is present. Lobulated renal contours are demonstrated bilaterally. GI/MESENTERY:   There is no evidence of bowel obstruction. The appendix is not seen, and suture material is present at the base of the cecum, perhaps related to history of appendectomy. The distal colon is largely decompressed and is not well assessed.  URINARY BLADDER: No visible calculus or focal wall thickening.  PELVIC NODES: No lymphadenopathy.   PELVIC ORGANS: The uterus is present. An intrauterine contraceptive device is seen. There is a left adnexal 3.5 x 2.7 x 3.8 cm cystic lesion. VASCULATURE:   No aneurysm is detected.  A retroaortic left renal vein is present. RETROPERITONEUM: No mass or lymphadenopathy is apparent.  BONES:   Mild scoliosis and multilevel degenerative changes are seen throughout the spine. ABDOMINAL WALL: There is transversely oriented lower abdominopelvic wall scarring, suggestive of prior Pfannenstiel incision. Mild midline ventral abdominal scarring is seen. Cutaneous thickening of the ventral abdominal wall is present. OTHER: No free air or fluid is seen in the abdomen or pelvis.           CONCLUSION:  1. An obstructing 0.7-0.8 cm calculus is lodged at the right ureteropelvic junction with resultant right-sided hydronephrosis.  2. There is urothelial enhancement of the right renal pelvis, which may relate to obstructive uropathy, but correlation with urinalysis is recommended to exclude superimposed infection of the obstructed collecting system.  3. Hepatomegaly with underlying hepatic steatosis.  4. An intrauterine contraceptive device is present.  5. Left adnexal follicular cystic lesion, likely physiologic.  6. Postprocedural changes suggesting probable appendectomy.   7. Lesser incidental findings as above.    A preliminary report was issued by the [facility] teleradiology service. There are no major discrepancies.   Dictated by (CST): Reinaldo Mancia MD on 7/16/2024 at 6:51 AM     Finalized by (CST): Reinaldo Mancia MD on 7/16/2024 at 6:57 AM          XR ABDOMEN (1 VIEW)  (CPT=74018)    Result Date: 7/11/2024  PROCEDURE: XR ABDOMEN (1 VIEW) (CPT=74018)  COMPARISON: None.  INDICATIONS: Presents with lower abdomen pain, nausea and vomiting on and off since 05/2024. Pt states in May had possible food poisoning and threw up 8 times in 1 day.  TECHNIQUE:   Single view.   FINDINGS:  BOWEL GAS PATTERN: Nonspecific nonobstructive abdominal gas pattern. SOFT TISSUES: Normal.  No masses or organomegaly.  CALCIFICATIONS: None significant. BONES: Minimal lumbar spondylosis.  Nonspecific bilateral sacroiliitis.  Reactive right-sided osteitis symphysis . OTHER: T-shaped IUD within the midline of the pelvis.         CONCLUSION:  1. Nonspecific nonobstructive abdominal gas pattern. 2. No radiopaque calculi visualized. 3. T-shaped IUD within the midline of the pelvis.    Dictated by (CST): Nate José MD on 7/11/2024 at 8:33 AM     Finalized by (CST): Nate José MD on 7/11/2024 at 8:34 AM              Impression:     Patient is a 49 year old female is POD 1 of a cystoscopy, right ureteroscopy, right retrograde pyelogram, ureteral dilation, and right ureteral stent placement for right ureteral stone.    WBC 13.0- May be reactive. Urine culture sent to make sure.    Recommendations:  - Awaiting cultures. Antibiotics per IM  - Check PVR once  - Pain control  - Push fluids  - Continue Tamsulosin 0.4 mg every day  - Okay for discharge from urological standpoint when pain is controlled. Plan for outpatient definitive treatment of stone in 3-4 weeks    Thank you very much for this consult. Please call if there are any questions or concerns.     JUVENCIO Benitez  Urology  [facility]    Date: 7/31/2024  Time: 8:22 AM

## 2024-07-31 NOTE — PLAN OF CARE
Pain controlled with toradol and norco. Dark red urine. Straining urine. No stones strained. Eating well. No nausea. Possible home tomorrow.     Problem: GENITOURINARY - ADULT  Goal: Absence of urinary retention  Description: INTERVENTIONS:  - Assess patient’s ability to void and empty bladder  - Monitor intake/output and perform bladder scan as needed  - Follow urinary retention protocol/standard of care  - Consider collaborating with pharmacy to review patient's medication profile  - Implement strategies to promote bladder emptying  7/31/2024 1659 by Rox Hernández, RN  Outcome: Not Progressing  7/31/2024 1658 by Rox Hernández, RN  Outcome: Progressing     Problem: GASTROINTESTINAL - ADULT  Goal: Minimal or absence of nausea and vomiting  Description: INTERVENTIONS:  - Maintain adequate hydration with IV or PO as ordered and tolerated  - Nasogastric tube to low intermittent suction as ordered  - Evaluate effectiveness of ordered antiemetic medications  - Provide nonpharmacologic comfort measures as appropriate  - Advance diet as tolerated, if ordered  - Obtain nutritional consult as needed  - Evaluate fluid balance  7/31/2024 1659 by Rox Hernández, RN  Outcome: Progressing  7/31/2024 1658 by Rox Hernández, RN  Outcome: Progressing     Problem: PAIN - ADULT  Goal: Verbalizes/displays adequate comfort level or patient's stated pain goal  Description: INTERVENTIONS:  - Encourage pt to monitor pain and request assistance  - Assess pain using appropriate pain scale  - Administer analgesics based on type and severity of pain and evaluate response  - Implement non-pharmacological measures as appropriate and evaluate response  - Consider cultural and social influences on pain and pain management  - Manage/alleviate anxiety  - Utilize distraction and/or relaxation techniques  - Monitor for opioid side effects  - Notify MD/LIP if interventions unsuccessful or patient reports new pain  - Anticipate increased pain with  activity and pre-medicate as appropriate  Outcome: Progressing     Problem: Diabetes/Glucose Control  Goal: Glucose maintained within prescribed range  Description: INTERVENTIONS:  - Monitor Blood Glucose as ordered  - Assess for signs and symptoms of hyperglycemia and hypoglycemia  - Administer ordered medications to maintain glucose within target range  - Assess barriers to adequate nutritional intake and initiate nutrition consult as needed  - Instruct patient on self management of diabetes  Outcome: Progressing

## 2024-07-31 NOTE — ED INITIAL ASSESSMENT (HPI)
Pt arrives through triage with complaints of abdominal pain, +NV. Pt got a stent placed on right side  by urology today after attempting  to remove a kidney stone    Toradol around 8pm with minimal relief

## 2024-07-31 NOTE — PROGRESS NOTES
[facility]  part of [facility]    Progress Note    Kay Tracy Patient Status:  Observation    1975 MRN [id number]   Location [facility] 1W Attending Harriet Neil,*   Hosp Day # 0 PCP Jessica Wong MD     Chief Complaint: pain    Subjective:     Constitutional:  Negative for appetite change, diaphoresis and fatigue.   HENT:  Negative for congestion.    Respiratory:  Negative for chest tightness.    Cardiovascular:  Negative for leg swelling.   Gastrointestinal:  Positive for heartburn and abdominal pain.   Genitourinary:  Positive for dysuria, hematuria, flank pain and difficulty urinating.   Psychiatric/Behavioral:  Positive for sleep disturbance. Negative for confusion and decreased concentration. The patient is not nervous/anxious.        Objective:   Blood pressure (!) 114/99, pulse 95, temperature 98.2 °F (36.8 °C), temperature source Axillary, resp. rate 20, height 5' 3\" (1.6 m), weight 245 lb (111.1 kg), SpO2 96%, not currently breastfeeding.  Physical Exam  Vitals and nursing note reviewed.   Constitutional:       Appearance: She is obese.   Cardiovascular:      Rate and Rhythm: Normal rate and regular rhythm.      Pulses: Normal pulses.   Pulmonary:      Breath sounds: Rhonchi present. No wheezing.   Abdominal:      Tenderness: There is abdominal tenderness.   Skin:     General: Skin is warm and dry.      Capillary Refill: Capillary refill takes less than 2 seconds.   Neurological:      General: No focal deficit present.      Mental Status: She is alert and oriented to person, place, and time.   Psychiatric:         Behavior: Behavior normal.         Judgment: Judgment normal.         Results:   Lab Results   Component Value Date    WBC 13.0 (H) 2024    HGB 14.6 2024    HCT 43.9 2024    .0 2024    CREATSERUM 0.78 2024    BUN 12 2024     2024    K 4.1 2024     2024    CO2  21.0 07/31/2024     (H) 07/31/2024    CA 9.0 07/31/2024    ALB 4.5 07/16/2024    ALKPHO 91 07/16/2024    BILT 1.2 07/16/2024    TP 7.0 07/16/2024    AST 26 07/16/2024    ALT 28 07/16/2024    TSH 1.194 12/16/2023    LIP 30 07/16/2024    B12 438 12/16/2023       No results found.        Assessment & Plan:     Right flank pain  Right ureteral stone s/p stent  -Admit  -Pain control  -IVF  -Urology on consult     Leucocytosis, maybe reactive  Rule out UTI/pyelonephritis  -Continue empiric antibiotics with ceftriaxone  -Follow-up cultures     DM  -Hold oral hypoglycemics  -Cover with SSI  -Carb controlled diet     HTN  Asthma  HLD  -Resume home medications     Quality:  DVT Prophylaxis: Heparin  CODE status: Full code  GLORIA: poss 8/1       Post  mn 35 min spent        TRACI VENTURA MD

## 2024-07-31 NOTE — ED QUICK NOTES
Orders for admission, patient is aware of plan and ready to go upstairs. Any questions, please call ED RN Peter at extension [phone].     Patient Covid vaccination status: Fully vaccinated     COVID Test Ordered in ED: None    COVID Suspicion at Admission: N/A    Running Infusions:    Ceftriaxone 200 mL/hr.     Mental Status/LOC at time of transport: AxOx3    Other pertinent information:   CIWA score: N/A   NIH score:  N/A

## 2024-07-31 NOTE — H&P
History and Physical     Kay Tracy Patient Status:  Emergency    1975 MRN [id number]   Location [facility] EMERGENCY DEPARTMENT Attending Alma Meadows MD   Hosp Day # 0 PCP Jessica Wong MD       Chief Complaint: Abdominal pain    Subjective:    Kay Tracy is a 49 year old female with history of asthma, DM, HTN, HLD, kidney stones presented to the ED with complaints of abdominal pain, right-sided.  She had ureteroscopy with stent placement done a day prior for a known 7 mm ureteral stone with hydronephrosis.  Subsequently developed severe uncontrolled pain and came to the ED for further evaluation.  Associated with multiple episodes of vomiting.  At presentation, mild tachycardia but vitals otherwise stable.  Labs with WBC 13.0  Ultrasound obtained showed persistent stone.    She was given morphine and Toradol but still fairly uncomfortable.  She has been started on antibiotics with ceftriaxone, urology consulted and she has been admitted for further evaluation.    History/Other:      Past Medical History:  Past Medical History:    Allergic rhinitis    Asthma    allergy induced    Carpal tunnel syndrome    Diabetes (HCC)    diabetes for 2011    Eczema    2-3 years ago, only on right fingers    Essential hypertension    Normal BP readings at home    GDM (gestational diabetes mellitus) (HCC)    High blood pressure    High cholesterol    HORMONE DISORDER    PCOS    Infertility female    Obesity    Other and unspecified hyperlipidemia    PCOS (polycystic ovarian syndrome)    Seasonal allergies    Shoulder tendonitis    Type II or unspecified type diabetes mellitus without mention of complication, uncontrolled    Visual impairment    glasses        Past Surgical History:   Past Surgical History:   Procedure Laterality Date    Adenoidectomy        ,    Carpal tunnel release Right     Cataract      Cataract extraction w/  intraocular lens implant Right  07/26/2023    Dr. Ny @ [facility]    Colonoscopy N/A 02/15/2022    Procedure: COLONOSCOPY;  Surgeon: Bobby Ann MD;  Location: [facility] ENDOSCOPY    D & c  2017?    Laparoscopic appendectomy  02/27/2015    Other surgical history      IVF    Reduction of large breast  1998    Tonsillectomy  1980       Social History:  reports that she has never smoked. She has never used smokeless tobacco. She reports that she does not drink alcohol and does not use drugs.    Family History:   Family History   Problem Relation Age of Onset    Hypertension Father     Obesity Father     Cancer Paternal Grandfather         Esophageal cancer    Diabetes Paternal Grandmother     Stroke Paternal Grandmother     Cancer Son         Medulloblastoma    Glaucoma Neg     Macular degeneration Neg        Allergies:   Allergies   Allergen Reactions    Cat Dander [Dander]     Iodine (Topical)     Seasonal     Nickel RASH       Medications:    Current Facility-Administered Medications on File Prior to Encounter   Medication Dose Route Frequency Provider Last Rate Last Admin    [COMPLETED] acetaminophen (Tylenol Extra Strength) tab 1,000 mg  1,000 mg Oral Once Raquel Higgins MD   1,000 mg at 07/30/24 0802    [COMPLETED] famotidine (Pepcid) tab 20 mg  20 mg Oral Once Raquel Higgins MD   20 mg at 07/30/24 0802    Or    [COMPLETED] famotidine (Pepcid) 20 mg/2mL injection 20 mg  20 mg Intravenous Once Raquel Higgins MD        [COMPLETED] metoclopramide (Reglan) tab 10 mg  10 mg Oral Once Raquel Higgins MD   10 mg at 07/30/24 0802    Or    [COMPLETED] metoclopramide (Reglan) 5 mg/mL injection 10 mg  10 mg Intravenous Once Raquel Higgins MD        [COMPLETED] ceFAZolin (Ancef) 2g in 10mL IV syringe premix  2 g Intravenous Once Raquel Higgins MD   2 g at 07/30/24 0907    [COMPLETED] phenazopyridine (Pyridium) tab 100 mg  100 mg Oral Once Raquel Higgins MD   100 mg at 07/30/24 1102    [COMPLETED] ketorolac (Toradol) 15 MG/ML  injection 15 mg  15 mg Intravenous Once Raquel Higgins MD   15 mg at 24 1133    [COMPLETED] morphINE PF 4 MG/ML injection 4 mg  4 mg Intravenous Once Sapadin, Shelbi, DO   4 mg at 24 0427    [COMPLETED] ondansetron (Zofran) 4 MG/2ML injection 4 mg  4 mg Intravenous Once Sapadin, Shelbi, DO   4 mg at 24 0425    [COMPLETED] sodium chloride 0.9 % IV bolus 1,000 mL  1,000 mL Intravenous Once Sapadin, Shelbi, DO   Stopped at 24 0621    [COMPLETED] ketorolac (Toradol) 15 MG/ML injection 15 mg  15 mg Intravenous Once Sapadin, Shelbi, DO   15 mg at 24 0424    [COMPLETED] iopamidol 76% (ISOVUE-370) injection for power injector  70 mL Intravenous ONCE PRN Sapadin, Shelbi, DO   70 mL at 24 0602     Current Outpatient Medications on File Prior to Encounter   Medication Sig Dispense Refill    Ketorolac Tromethamine 10 MG Oral Tab Take 1 tablet (10 mg total) by mouth every 6 (six) hours as needed for Pain. 5 tablet 0    Ketorolac Tromethamine 10 MG Oral Tab Take 1 tablet (10 mg total) by mouth every 8 (eight) hours as needed for Pain (Use sparingly and with plenty of water). 5 tablet 0    sulfamethoxazole-trimethoprim -160 MG Oral Tab per tablet Take 1 tablet by mouth 2 (two) times daily for 3 days. 6 tablet 0    [] ondansetron 4 MG Oral Tablet Dispersible Take 1 tablet (4 mg total) by mouth every 4 to 6 hours as needed for Nausea. 10 tablet 0    [] Ketorolac Tromethamine 10 MG Oral Tab Take 1 tablet (10 mg total) by mouth every 6 (six) hours as needed. 28 tablet 0    [] HYDROcodone-acetaminophen 5-325 MG Oral Tab Take 1 tablet by mouth every 6 (six) hours as needed for Pain. 10 tablet 0    tamsulosin 0.4 MG Oral Cap Take 1 capsule (0.4 mg total) by mouth daily. Take 1/2 hour following the same meal each day 30 capsule 0    [] tobramycin 0.3 % Ophthalmic Solution Place 1-2 drops into both eyes every 6 (six) hours for 5 days. 1 each 0    Ciclopirox 8 % External  Solution Apply 1 mL topically nightly. 6.6 mL 1    cyclobenzaprine 5 MG Oral Tab Take 1 tablet (5 mg total) by mouth 3 (three) times daily as needed for Muscle spasms. 20 tablet 0    Continuous Glucose Sensor (DEXCOM G7 SENSOR) Does not apply Misc 1 each Every 10 days. 9 each 1    insulin regular human, conc, (HUMULIN R U-500 KWIKPEN) 500 UNIT/ML Subcutaneous Solution Pen-injector Inject 0.24 mL (120 Units total) into the skin daily with breakfast AND 0.24 mL (120 Units total) daily with lunch AND 0.25 mL (125 Units total) daily with dinner. 24 mL 2    metFORMIN  MG Oral Tablet 24 Hr TAKE FOUR TABLETS BY MOUTH DAILY WITH BREAKFAST. 360 tablet 3    dapagliflozin (FARXIGA) 10 MG Oral Tab Take 1 tablet (10 mg total) by mouth daily. 90 tablet 1    lisinopril 5 MG Oral Tab Take 1 tablet (5 mg total) by mouth daily. 90 tablet 3    atorvastatin 40 MG Oral Tab TAKE 1 TABLET BY MOUTH EVERY DAY AT NIGHT 90 tablet 3    OZEMPIC, 1 MG/DOSE, 4 MG/3ML Subcutaneous Solution Pen-injector INJECT 1MG INTO THE SKIN ONCE A WEEK (Patient taking differently: Inject 1 mg into the skin Every Monday.) 9 mL 1    traZODone 50 MG Oral Tab Take 1 tablet (50 mg total) by mouth nightly as needed for Sleep. 90 tablet 2    fluocinonide 0.05 % External Cream Use twice daily on affected areas, itchy bumps on hands 60 g 3    Insulin Pen Needle 32G X 4 MM Does not apply Misc Inject 3x daily 300 each 0    meclizine 25 MG Oral Tab Take 1 tablet (25 mg total) by mouth 3 (three) times daily as needed for Dizziness. 30 tablet 0    levonorgestrel 20 MCG/24HR Intrauterine IUD Mirena 20 mcg/24 hr (5 years) intrauterine device   Take by intrauterine route.         Review of Systems:   A comprehensive 14 point review of systems was completed.    Pertinent positives and negatives noted in the HPI.    Objective:     /85   Pulse 101   Temp 97.1 °F (36.2 °C) (Temporal)   Resp 25   Ht 5' 3\" (1.6 m)   Wt 245 lb (111.1 kg)   SpO2 93%   BMI 43.40 kg/m²    General: No acute distress.    HEENT: Normocephalic, atraumatic.  Neck: Supple.  Respiratory: Normal effort.  CTAB  Cardiovascular: S1, S2 regular.  Normal rate.   Abdomen: Soft, nondistended.  Right-sided abdominal/flank pain.  Neurologic: Alert, oriented x 3.  Nonfocal.  Musculoskeletal: No tenderness deformity.  Extremities: No edema  Psychiatric: Appropriate    Results:      Labs:  Labs Last 24 Hours:   BMP     CBC    Other     Na 137 Cl 106 BUN 12 Glu 270   Hb 14.6   PTT - Procal -   K 4.1 CO2 21.0 Cr 0.78   WBC 13.0 >< .0  INR - CRP -   Renal Lytes Endo    Hct 43.9   Trop - D dim -   eGFR - Ca 9.0 POC Gluc  136    LFT   pBNP - Lactic -   eGFR AA - PO4 - A1c -   AST - APk - Prot -  LDL -     Mg - TSH -   ALT - T lazaro - Alb -          COVID-19 Lab Results    COVID-19  Lab Results   Component Value Date    COVID19 Not Detected 02/12/2022       Pro-Calcitonin  No results for input(s): \"PCT\" in the last 168 hours.    Cardiac  No results for input(s): \"TROP\", \"PBNP\" in the last 168 hours.    Creatinine Kinase  No results for input(s): \"CK\" in the last 168 hours.    Inflammatory Markers  No results for input(s): \"CRP\", \"DEEPTI\", \"LDH\", \"DDIMER\" in the last 168 hours.    Imaging: Imaging data reviewed in Epic.    Assessment & Plan:    Right flank pain  Right ureteral stone s/p stent  -Admit  -Pain control  -IVF  -Urology on consult    Leucocytosis, maybe reactive  Rule out UTI/pyelonephritis  -Continue empiric antibiotics with ceftriaxone  -Follow-up cultures    DM  -Hold oral hypoglycemics  -Cover with SSI  -Carb controlled diet    HTN  Asthma  HLD  -Resume home medications    Quality:  DVT Prophylaxis: Heparin  CODE status: Full code  GLORIA: 1 to 2 days        Plan of care discussed with patient. Acknowledged understanding and agrees to plan. Also discussed with the ED physician.      55minutes spent on this admission - examining patient, obtaining history, reviewing previous medical records, going over test  results/imaging and discussing plan of care. More than 50% of the time was spent in counseling and/or coordination of care related to right flank pain, ureteral stone.   All questions answered.     Indigo Avilez MD  7/31/2024

## 2024-07-31 NOTE — PLAN OF CARE
S/p stent for kidney stone, now with nausea and vomiting and abdominal pain. Plan for today is pain management, ivf and iv abx, waiting on urology to see.     Problem: Patient Centered Care  Goal: Patient preferences are identified and integrated in the patient's plan of care  Description: Interventions:  - What would you like us to know as we care for you?   Problem: Diabetes/Glucose Control  Goal: Glucose maintained within prescribed range  Description: INTERVENTIONS:  - Monitor Blood Glucose as ordered  - Assess for signs and symptoms of hyperglycemia and hypoglycemia  - Administer ordered medications to maintain glucose within target range  - Assess barriers to adequate nutritional intake and initiate nutrition consult as needed  - Instruct patient on self management of diabetes  Outcome: Not Progressing     Problem: GASTROINTESTINAL - ADULT  Goal: Minimal or absence of nausea and vomiting  Description: INTERVENTIONS:  - Maintain adequate hydration with IV or PO as ordered and tolerated  - Nasogastric tube to low intermittent suction as ordered  - Evaluate effectiveness of ordered antiemetic medications  - Provide nonpharmacologic comfort measures as appropriate  - Advance diet as tolerated, if ordered  - Obtain nutritional consult as needed  - Evaluate fluid balance  Outcome: Not Progressing     Problem: GENITOURINARY - ADULT  Goal: Absence of urinary retention  Description: INTERVENTIONS:  - Assess patient’s ability to void and empty bladder  - Monitor intake/output and perform bladder scan as needed  - Follow urinary retention protocol/standard of care  - Consider collaborating with pharmacy to review patient's medication profile  - Implement strategies to promote bladder emptying  Outcome: Not Progressing     Problem: PAIN - ADULT  Goal: Verbalizes/displays adequate comfort level or patient's stated pain goal  Description: INTERVENTIONS:  - Encourage pt to monitor pain and request assistance  - Assess pain  using appropriate pain scale  - Administer analgesics based on type and severity of pain and evaluate response  - Implement non-pharmacological measures as appropriate and evaluate response  - Consider cultural and social influences on pain and pain management  - Manage/alleviate anxiety  - Utilize distraction and/or relaxation techniques  - Monitor for opioid side effects  - Notify MD/LIP if interventions unsuccessful or patient reports new pain  - Anticipate increased pain with activity and pre-medicate as appropriate  Outcome: Not Progressing     - Provide timely, complete, and accurate information to patient/family  - Incorporate patient and family knowledge, values, beliefs, and cultural backgrounds into the planning and delivery of care  - Encourage patient/family to participate in care and decision-making at the level they choose  - Honor patient and family perspectives and choices  Outcome: Not Progressing

## 2024-07-31 NOTE — ED PROVIDER NOTES
Patient Seen in: [facility] 1w      History     Chief Complaint   Patient presents with    Abdomen/Flank Pain    Vomiting     Stated Complaint: Vomiting/Kidney Stone?    Subjective:   HPI    49 year old female with known R side kidney stone, had ureteral stent placed outpt yesterday, and now presenting with uncontrolled pain. She denies fever/chills. She was vomiting at home last night. She tried toradol with now relief.    Objective:   No pertinent past medical history.            No pertinent past surgical history.              No pertinent social history.            Review of Systems    Positive for stated Chief Complaint: Abdomen/Flank Pain and Vomiting    Other systems are as noted in HPI.  Constitutional and vital signs reviewed.      All other systems reviewed and negative except as noted above.    Physical Exam     ED Triage Vitals [07/31/24 0042]   /88   Pulse 105   Resp 25   Temp 97.1 °F (36.2 °C)   Temp src Temporal   SpO2 96 %   O2 Device None (Room air)       Current Vitals:   Vital Signs  BP: 135/90  Pulse: 99  Resp: 20  Temp: 98.1 °F (36.7 °C)  Temp src: Oral  MAP (mmHg): (!) 105    Oxygen Therapy  SpO2: 97 %  O2 Device: None (Room air)            Physical Exam  Vitals and nursing note reviewed.   Constitutional:       General: She is in acute distress (appears uncomfortable).      Appearance: Normal appearance. She is well-developed. She is obese. She is not toxic-appearing or diaphoretic.   HENT:      Head: Normocephalic and atraumatic.   Eyes:      General: Lids are normal.      Extraocular Movements: Extraocular movements intact.      Conjunctiva/sclera: Conjunctivae normal.      Pupils: Pupils are equal, round, and reactive to light.   Cardiovascular:      Rate and Rhythm: Normal rate and regular rhythm.      Heart sounds: Normal heart sounds. No murmur heard.  Pulmonary:      Effort: Pulmonary effort is normal. No respiratory distress.      Breath sounds: Normal breath sounds. No  wheezing.   Abdominal:      General: Bowel sounds are normal. There is no distension.      Palpations: Abdomen is soft. Abdomen is not rigid. There is no pulsatile mass.      Tenderness: There is no abdominal tenderness. There is right CVA tenderness. There is no guarding.   Musculoskeletal:         General: No tenderness. Normal range of motion.      Cervical back: Full passive range of motion without pain, normal range of motion and neck supple. No rigidity. Normal range of motion.   Skin:     General: Skin is warm and dry.      Findings: No rash.   Neurological:      Mental Status: She is alert and oriented to person, place, and time.      Sensory: No sensory deficit.   Psychiatric:         Attention and Perception: Attention normal.         Mood and Affect: Mood normal.         Behavior: Behavior normal. Behavior is cooperative.               ED Course     Labs Reviewed   BASIC METABOLIC PANEL (8) - Abnormal; Notable for the following components:       Result Value    Glucose 270 (*)     All other components within normal limits   URINALYSIS WITH CULTURE REFLEX - Abnormal; Notable for the following components:    Urine Color Dark-Brown (*)     Clarity Urine Ex.Turbid (*)     WBC Urine 11-20 (*)     RBC Urine >10 (*)     Bacteria Urine Rare (*)     Squamous Epi. Cells Few (*)     WBC Clump Present (*)     All other components within normal limits   POCT GLUCOSE - Abnormal; Notable for the following components:    POC Glucose  188 (*)     All other components within normal limits   CBC W/ DIFFERENTIAL - Abnormal; Notable for the following components:    WBC 13.0 (*)     Neutrophil Absolute Prelim 10.84 (*)     Neutrophil Absolute 10.84 (*)     All other components within normal limits   SPECIFIC GRAVITY, URINE - Normal   CBC WITH DIFFERENTIAL WITH PLATELET    Narrative:     The following orders were created for panel order CBC With Differential With Platelet.  Procedure                               Abnormality          Status                     ---------                               -----------         ------                     CBC W/ DIFFERENTIAL[[id number]]          Abnormal            Final result                 Please view results for these tests on the individual orders.   RAINBOW DRAW LAVENDER   RAINBOW DRAW LIGHT GREEN   RAINBOW DRAW GOLD   URINE CULTURE, ROUTINE          ED Course as of 07/31/24 1103  ------------------------------------------------------------  Time: 07/31 0344  Value: US KIDNEY/BLADDER (CPT=76770)  Comment: Renal ultrasound    Comparison: CT from July 16, 2024      IMPRESSION:    Similar findings to previous CT.    There is mild right-sided hydronephrosis.  Probable 7 mm calculus at the right UPJ, probably corresponding to the calculus present on prior CT.    Partial visualization of what appears to be the patient's right ureteral stent.    No evidence of hydronephrosis on the left.    Urinary bladder is decompressed.  Suboptimal visualization due to the patient's body habitus.                  MDM      Pulse Ox: 97%, Normal, RA          Admission disposition: 7/31/2024  4:20 AM              Pt with ureteral stent placed yest, in significant pain. US shows R side UPJ stone still present with hydronephrosis. Will admit for pain control.     D/w Dr Avilez - will admit  D/w Dr Rizzo (uro) - will consult, advises give dose rocephin here as well      Disposition and Plan     Clinical Impression:  1. Right kidney stone    2. Ureteral stent present    3. Intractable pain         Disposition:  Admit  7/31/2024  4:20 am    Follow-up:  Jessica Wong MD  [address] [place] [id number]  [phone]    Schedule an appointment as soon as possible for a visit in 1 week(s)            Medications Prescribed:  Current Discharge Medication List                            Hospital Problems       Present on Admission  Date Reviewed: 7/31/2024            ICD-10-CM Noted POA    * (Principal)  Right kidney stone N20.0 7/31/2024 Unknown    Intractable pain R52 7/31/2024 Unknown    Kidney stone on right side N20.0 7/31/2024 Yes    Metabolic acidosis E87.20 7/31/2024 Yes    Ureteral stent present Z96.0 7/31/2024 Unknown

## 2024-08-01 ENCOUNTER — PATIENT OUTREACH (OUTPATIENT)
Dept: CASE MANAGEMENT | Age: 49
End: 2024-08-01

## 2024-08-01 VITALS
OXYGEN SATURATION: 99 % | WEIGHT: 245 LBS | HEIGHT: 63 IN | DIASTOLIC BLOOD PRESSURE: 69 MMHG | HEART RATE: 92 BPM | RESPIRATION RATE: 18 BRPM | SYSTOLIC BLOOD PRESSURE: 127 MMHG | TEMPERATURE: 99 F | BODY MASS INDEX: 43.41 KG/M2

## 2024-08-01 LAB
ANION GAP SERPL CALC-SCNC: 7 MMOL/L (ref 0–18)
BASOPHILS # BLD AUTO: 0.03 X10(3) UL (ref 0–0.2)
BASOPHILS NFR BLD AUTO: 0.3 %
BUN BLD-MCNC: 11 MG/DL (ref 9–23)
BUN/CREAT SERPL: 19.3 (ref 10–20)
CALCIUM BLD-MCNC: 7.8 MG/DL (ref 8.7–10.4)
CHLORIDE SERPL-SCNC: 110 MMOL/L (ref 98–112)
CO2 SERPL-SCNC: 24 MMOL/L (ref 21–32)
CREAT BLD-MCNC: 0.57 MG/DL
DEPRECATED RDW RBC AUTO: 42.8 FL (ref 35.1–46.3)
EGFRCR SERPLBLD CKD-EPI 2021: 112 ML/MIN/1.73M2 (ref 60–?)
EOSINOPHIL # BLD AUTO: 0.38 X10(3) UL (ref 0–0.7)
EOSINOPHIL NFR BLD AUTO: 4.1 %
ERYTHROCYTE [DISTWIDTH] IN BLOOD BY AUTOMATED COUNT: 13.2 % (ref 11–15)
GLUCOSE BLD-MCNC: 143 MG/DL (ref 70–99)
GLUCOSE BLDC GLUCOMTR-MCNC: 144 MG/DL (ref 70–99)
GLUCOSE BLDC GLUCOMTR-MCNC: 163 MG/DL (ref 70–99)
HCT VFR BLD AUTO: 38 %
HGB BLD-MCNC: 12.7 G/DL
IMM GRANULOCYTES # BLD AUTO: 0.03 X10(3) UL (ref 0–1)
IMM GRANULOCYTES NFR BLD: 0.3 %
LYMPHOCYTES # BLD AUTO: 2.59 X10(3) UL (ref 1–4)
LYMPHOCYTES NFR BLD AUTO: 28.1 %
MAGNESIUM SERPL-MCNC: 1.8 MG/DL (ref 1.6–2.6)
MCH RBC QN AUTO: 29.5 PG (ref 26–34)
MCHC RBC AUTO-ENTMCNC: 33.4 G/DL (ref 31–37)
MCV RBC AUTO: 88.4 FL
MONOCYTES # BLD AUTO: 0.72 X10(3) UL (ref 0.1–1)
MONOCYTES NFR BLD AUTO: 7.8 %
NEUTROPHILS # BLD AUTO: 5.48 X10 (3) UL (ref 1.5–7.7)
NEUTROPHILS # BLD AUTO: 5.48 X10(3) UL (ref 1.5–7.7)
NEUTROPHILS NFR BLD AUTO: 59.4 %
OSMOLALITY SERPL CALC.SUM OF ELEC: 294 MOSM/KG (ref 275–295)
PHOSPHATE SERPL-MCNC: 2.7 MG/DL (ref 2.4–5.1)
PLATELET # BLD AUTO: 174 10(3)UL (ref 150–450)
POTASSIUM SERPL-SCNC: 3.8 MMOL/L (ref 3.5–5.1)
RBC # BLD AUTO: 4.3 X10(6)UL
SODIUM SERPL-SCNC: 141 MMOL/L (ref 136–145)
WBC # BLD AUTO: 9.2 X10(3) UL (ref 4–11)

## 2024-08-01 PROCEDURE — 99239 HOSP IP/OBS DSCHRG MGMT >30: CPT | Performed by: HOSPITALIST

## 2024-08-01 RX ORDER — ACETAMINOPHEN 500 MG
500 TABLET ORAL EVERY 4 HOURS PRN
Status: SHIPPED | COMMUNITY
Start: 2024-08-01

## 2024-08-01 RX ORDER — ONDANSETRON 4 MG/1
4 TABLET, ORALLY DISINTEGRATING ORAL
Qty: 10 TABLET | Refills: 0 | Status: SHIPPED | OUTPATIENT
Start: 2024-08-01 | End: 2024-08-08

## 2024-08-01 RX ORDER — MAGNESIUM OXIDE 400 MG/1
400 TABLET ORAL ONCE
Status: COMPLETED | OUTPATIENT
Start: 2024-08-01 | End: 2024-08-01

## 2024-08-01 RX ORDER — HYDROCODONE BITARTRATE AND ACETAMINOPHEN 10; 325 MG/1; MG/1
1 TABLET ORAL EVERY 6 HOURS PRN
Qty: 30 TABLET | Refills: 0 | Status: SHIPPED | OUTPATIENT
Start: 2024-08-01

## 2024-08-01 NOTE — PROGRESS NOTES
Pt currently in [facility] [id number]    Called cell # to schedule hospital follow-up appts :    Follow up with Raquel Higgins  Specialty: UROLOGY  3-4 weeks  [facility]  [address]  [place] [address]  [phone]  Procedure scheduled 8/20 @8:40am w/ Dr. Higgins    Schedule an appointment with Jessica Wong as soon as  possible for a visit in 1 week(s)  Specialty: Family Medicine  [address]  [address]  [place] [address]  [phone]  DECLINED - pt states that she's ok with following up with Urology.    No further assistance needed.    Closing encounter

## 2024-08-01 NOTE — PLAN OF CARE
Problem: Patient Centered Care  Goal: Patient preferences are identified and integrated in the patient's plan of care  Description: Interventions:  - What would you like us to know as we care for you? ***  - Provide timely, complete, and accurate information to patient/family  - Incorporate patient and family knowledge, values, beliefs, and cultural backgrounds into the planning and delivery of care  - Encourage patient/family to participate in care and decision-making at the level they choose  - Honor patient and family perspectives and choices  Outcome: Progressing     Problem: Diabetes/Glucose Control  Goal: Glucose maintained within prescribed range  Description: INTERVENTIONS:  - Monitor Blood Glucose as ordered  - Assess for signs and symptoms of hyperglycemia and hypoglycemia  - Administer ordered medications to maintain glucose within target range  - Assess barriers to adequate nutritional intake and initiate nutrition consult as needed  - Instruct patient on self management of diabetes  Outcome: Progressing     Problem: Patient/Family Goals  Goal: Patient/Family Long Term Goal  Description: Patient's Long Term Goal: ***    Interventions:  - ***  - See additional Care Plan goals for specific interventions  Outcome: Progressing  Goal: Patient/Family Short Term Goal  Description: Patient's Short Term Goal: ***    Interventions:   - ***  - See additional Care Plan goals for specific interventions  Outcome: Progressing     Problem: GASTROINTESTINAL - ADULT  Goal: Minimal or absence of nausea and vomiting  Description: INTERVENTIONS:  - Maintain adequate hydration with IV or PO as ordered and tolerated  - Nasogastric tube to low intermittent suction as ordered  - Evaluate effectiveness of ordered antiemetic medications  - Provide nonpharmacologic comfort measures as appropriate  - Advance diet as tolerated, if ordered  - Obtain nutritional consult as needed  - Evaluate fluid balance  Outcome: Progressing      Problem: GENITOURINARY - ADULT  Goal: Absence of urinary retention  Description: INTERVENTIONS:  - Assess patient’s ability to void and empty bladder  - Monitor intake/output and perform bladder scan as needed  - Follow urinary retention protocol/standard of care  - Consider collaborating with pharmacy to review patient's medication profile  - Implement strategies to promote bladder emptying  Outcome: Progressing     Problem: PAIN - ADULT  Goal: Verbalizes/displays adequate comfort level or patient's stated pain goal  Description: INTERVENTIONS:  - Encourage pt to monitor pain and request assistance  - Assess pain using appropriate pain scale  - Administer analgesics based on type and severity of pain and evaluate response  - Implement non-pharmacological measures as appropriate and evaluate response  - Consider cultural and social influences on pain and pain management  - Manage/alleviate anxiety  - Utilize distraction and/or relaxation techniques  - Monitor for opioid side effects  - Notify MD/LIP if interventions unsuccessful or patient reports new pain  - Anticipate increased pain with activity and pre-medicate as appropriate  Outcome: Progressing   No acute changes. Vital signs stable. Afebrile. Complained moderate flank pain managed with Norco. Denies nausea. IV fluids infusing. Patient has reddish brown urine, denies pain with urination. Straining urine- no stone noted. Possible discharge today.

## 2024-08-01 NOTE — PLAN OF CARE
Patient is alert and oriented. Pain well managed with Norco 10 mg. Patient denies nausea. Urine clearing to roberta color with sediment present. No stones found with straining of urine. IV antibiotics and fluids continued. Discharge education given and patient discharged home.     Problem: Patient Centered Care  Goal: Patient preferences are identified and integrated in the patient's plan of care  Description: Interventions:  - What would you like us to know as we care for you?   - Provide timely, complete, and accurate information to patient/family  - Incorporate patient and family knowledge, values, beliefs, and cultural backgrounds into the planning and delivery of care  - Encourage patient/family to participate in care and decision-making at the level they choose  - Honor patient and family perspectives and choices  Outcome: Progressing     Problem: Diabetes/Glucose Control  Goal: Glucose maintained within prescribed range  Description: INTERVENTIONS:  - Monitor Blood Glucose as ordered  - Assess for signs and symptoms of hyperglycemia and hypoglycemia  - Administer ordered medications to maintain glucose within target range  - Assess barriers to adequate nutritional intake and initiate nutrition consult as needed  - Instruct patient on self management of diabetes  Outcome: Progressing     Problem: Patient/Family Goals  Goal: Patient/Family Long Term Goal  Description: Patient's Long Term Goal:     Interventions:  -   - See additional Care Plan goals for specific interventions  Outcome: Progressing  Goal: Patient/Family Short Term Goal  Description: Patient's Short Term Goal:     Interventions:   -   - See additional Care Plan goals for specific interventions  Outcome: Progressing     Problem: GASTROINTESTINAL - ADULT  Goal: Minimal or absence of nausea and vomiting  Description: INTERVENTIONS:  - Maintain adequate hydration with IV or PO as ordered and tolerated  - Nasogastric tube to low intermittent suction as  ordered  - Evaluate effectiveness of ordered antiemetic medications  - Provide nonpharmacologic comfort measures as appropriate  - Advance diet as tolerated, if ordered  - Obtain nutritional consult as needed  - Evaluate fluid balance  Outcome: Progressing     Problem: GENITOURINARY - ADULT  Goal: Absence of urinary retention  Description: INTERVENTIONS:  - Assess patient’s ability to void and empty bladder  - Monitor intake/output and perform bladder scan as needed  - Follow urinary retention protocol/standard of care  - Consider collaborating with pharmacy to review patient's medication profile  - Implement strategies to promote bladder emptying  Outcome: Progressing     Problem: PAIN - ADULT  Goal: Verbalizes/displays adequate comfort level or patient's stated pain goal  Description: INTERVENTIONS:  - Encourage pt to monitor pain and request assistance  - Assess pain using appropriate pain scale  - Administer analgesics based on type and severity of pain and evaluate response  - Implement non-pharmacological measures as appropriate and evaluate response  - Consider cultural and social influences on pain and pain management  - Manage/alleviate anxiety  - Utilize distraction and/or relaxation techniques  - Monitor for opioid side effects  - Notify MD/LIP if interventions unsuccessful or patient reports new pain  - Anticipate increased pain with activity and pre-medicate as appropriate  Outcome: Progressing

## 2024-08-01 NOTE — TELEPHONE ENCOUNTER
Spoke with patient was admitted to the hospital after surgery.    Scheduled and confirmed Cystoscopy right ureteroscopy, laser lithotripsy,  right retrograde pyelogram, right stent exchange, Thursday 08/20/2024.    Patient informed to please have urine culture done 10 days prior to scheduled surgery.

## 2024-08-01 NOTE — DISCHARGE SUMMARY
Dc summary#[id number]  > 30 min spent on dc    Hospital Discharge Diagnoses:  kidney stone    Lace+ Score: 41  59-90 High Risk  29-58 Medium Risk  0-28   Low Risk.    TCM Follow-Up Recommendation:  LACE > 58: High Risk of readmission after discharge from the hospital.  Tcm fu recommended

## 2024-08-01 NOTE — DISCHARGE INSTRUCTIONS
Ok to go home  Please send home with inc keke  Return if issues  Please follow 2000 yovany ada diet     Medication List        START taking these medications      acetaminophen 500 MG Tabs  Commonly known as: Tylenol Extra Strength     HYDROcodone-acetaminophen  MG Tabs  Commonly known as: Norco  Take 1 tablet by mouth every 6 (six) hours as needed for Pain. Watch drowsiness no alcohol  Replaces: HYDROcodone-acetaminophen 5-325 MG Tabs            CHANGE how you take these medications      atorvastatin 40 MG Tabs  Commonly known as: Lipitor  TAKE 1 TABLET BY MOUTH EVERY DAY AT NIGHT  What changed:   how much to take  how to take this  when to take this     Ozempic (1 MG/DOSE) 4 MG/3ML Sopn  Generic drug: semaglutide  INJECT 1MG INTO THE SKIN ONCE A WEEK  What changed: when to take this            CONTINUE taking these medications      Ciclopirox 8 % Soln  Apply 1 mL topically nightly.     cyclobenzaprine 5 MG Tabs  Commonly known as: Flexeril  Take 1 tablet (5 mg total) by mouth 3 (three) times daily as needed for Muscle spasms.     Dexcom G7 Sensor Misc  1 each Every 10 days.     fluocinonide 0.05 % Crea  Commonly known as: Lidex  Use twice daily on affected areas, itchy bumps on hands     HumuLIN R U-500 KwikPen 500 UNIT/ML Sopn  Generic drug: insulin regular human (conc)  Inject 0.24 mL (120 Units total) into the skin daily with breakfast AND 0.24 mL (120 Units total) daily with lunch AND 0.25 mL (125 Units total) daily with dinner.     Insulin Pen Needle 32G X 4 MM Misc  Inject 3x daily     levonorgestrel 20 MCG/24HR Iud  Commonly known as: Mirena     lisinopril 5 MG Tabs  Commonly known as: Prinivil; Zestril  Take 1 tablet (5 mg total) by mouth daily.     meclizine 25 MG Tabs  Commonly known as: Antivert  Take 1 tablet (25 mg total) by mouth 3 (three) times daily as needed for Dizziness.     ondansetron 4 MG Tbdp  Commonly known as: Zofran-ODT  Take 1 tablet (4 mg total) by mouth every 4 to 6 hours as needed  for Nausea.     tamsulosin 0.4 MG Caps  Commonly known as: Flomax  Take 1 capsule (0.4 mg total) by mouth daily. Take 1/2 hour following the same meal each day     traZODone 50 MG Tabs  Commonly known as: Desyrel  Take 1 tablet (50 mg total) by mouth nightly as needed for Sleep.            STOP taking these medications      dapagliflozin 10 MG Tabs  Commonly known as: Farxiga     HYDROcodone-acetaminophen 5-325 MG Tabs  Commonly known as: Norco  Replaced by: HYDROcodone-acetaminophen  MG Tabs     Ketorolac Tromethamine 10 MG Tabs  Commonly known as: TORADOL     metFORMIN  MG Tb24  Commonly known as: Glucophage XR     sulfamethoxazole-trimethoprim -160 MG Tabs per tablet  Commonly known as: Bactrim DS     tobramycin 0.3 % Soln  Commonly known as: Tobrex               Where to Get Your Medications        These medications were sent to [facility] [address] [place], [place] - [address] [phone], [phone]  [address] [facility] [address]      Phone: [phone]   HYDROcodone-acetaminophen  MG Tabs  ondansetron 4 MG Tbdp

## 2024-08-02 ENCOUNTER — PATIENT MESSAGE (OUTPATIENT)
Dept: SURGERY | Facility: CLINIC | Age: 49
End: 2024-08-02

## 2024-08-02 ENCOUNTER — PATIENT OUTREACH (OUTPATIENT)
Dept: CASE MANAGEMENT | Age: 49
End: 2024-08-02

## 2024-08-02 DIAGNOSIS — Z96.0 URETERAL STENT PRESENT: Primary | ICD-10-CM

## 2024-08-02 DIAGNOSIS — Z02.9 ENCOUNTERS FOR UNSPECIFIED ADMINISTRATIVE PURPOSE: ICD-10-CM

## 2024-08-02 DIAGNOSIS — N20.0 KIDNEY STONE ON RIGHT SIDE: ICD-10-CM

## 2024-08-02 PROCEDURE — 1111F DSCHRG MED/CURRENT MED MERGE: CPT

## 2024-08-02 RX ORDER — SEMAGLUTIDE 1.34 MG/ML
INJECTION, SOLUTION SUBCUTANEOUS
Qty: 9 ML | Refills: 1 | Status: SHIPPED | OUTPATIENT
Start: 2024-08-02

## 2024-08-02 NOTE — PROGRESS NOTES
Initial Post Discharge Follow Up   Discharge Date: 8/1/24  Contact Date: 8/2/2024    Consent Verification:  Assessment Completed With: Patient  HIPAA Verified?  Yes    Discharge Dx:   Right flank pain  Right ureteral stone s/p stent  Leucocytosis, maybe reactive  Rule out UTI/pyelonephritis  DM  HTN  Asthma  HLD      General:   How have you been since your discharge from the hospital? The patient reports doing well since hospital discharge. The patient reported abdominal/flank pain has been well managed with the prescribed medications. The patient reported current pain is at 2/10. The patient denies any nausea/vomiting, fever, urinary retention, urinary leak/incontinence, or weakness/dizziness, hematuria or blood clots in the urine, malodorous/cloudy urine, and reports the stent has continued proper placement. The patient denies any difficulty breathing/ shortness of breath, chest pain, productive cough, or wheezing. The patient has not yet checked blood pressure or glucose since returning home.   Do you have any pain since discharge?  Yes  Where: flank/abdominal pain    Rating on pain scale 1-10, 10 being the worst pain you have ever experienced, what is current pain: 2  Alleviating factors: heating pad   Is the pain manageable at home? Yes  How well was your pain managed while in the hospital?   On a scale of 1-5   1- Very Poor and 5- Very well   Very Well  When you were leaving the hospital were your discharge instructions reviewed with you? Yes  How well were your discharge instructions explained to you?   On a scale of 1-5   1- Very Poor and 5- Very well   Very Well  Do you have any questions about your discharge instructions?  Yes; The patient was questioning discontinuing oral diabetic medications.   Before leaving the hospital was your diagnoses explained to you? Yes  Do you have any questions about your diagnoses? No  Are you able to perform normal daily activities of living as you have prior to your  hospital stay (dressing, bathing, ambulating to the bathroom, etc)? yes    (NCM) Was patient given a different diet per AVS? no      Medications:  NCM did confirm the patient has discontinued the following as directed:  dapagliflozin 10 MG Tabs  Commonly known as: Farxiga  HYDROcodone-acetaminophen 5-325 MG Tabs  Commonly known as: Norco  Replaced by: HYDROcodone-acetaminophen  MG Tabs  Ketorolac Tromethamine 10 MG Tabs  Commonly known as: TORADOL  metFORMIN  MG Tb24  Commonly known as: Glucophage XR  sulfamethoxazole-trimethoprim -160 MG Tabs per tablet  Commonly known as: Bactrim DS  tobramycin 0.3 % Soln  Commonly known as: Tobrex  Current Outpatient Medications   Medication Sig Dispense Refill    ondansetron 4 MG Oral Tablet Dispersible Take 1 tablet (4 mg total) by mouth every 4 to 6 hours as needed for Nausea. 10 tablet 0    HYDROcodone-acetaminophen  MG Oral Tab Take 1 tablet by mouth every 6 (six) hours as needed for Pain. Watch drowsiness no alcohol 30 tablet 0    acetaminophen 500 MG Oral Tab Take 1 tablet (500 mg total) by mouth every 4 (four) hours as needed for Fever (equal to or greater than 100.4).      tamsulosin 0.4 MG Oral Cap Take 1 capsule (0.4 mg total) by mouth daily. Take 1/2 hour following the same meal each day 30 capsule 0    Ciclopirox 8 % External Solution Apply 1 mL topically nightly. 6.6 mL 1    cyclobenzaprine 5 MG Oral Tab Take 1 tablet (5 mg total) by mouth 3 (three) times daily as needed for Muscle spasms. 20 tablet 0    Continuous Glucose Sensor (DEXCOM G7 SENSOR) Does not apply Misc 1 each Every 10 days. 9 each 1    insulin regular human, conc, (HUMULIN R U-500 KWIKPEN) 500 UNIT/ML Subcutaneous Solution Pen-injector Inject 0.24 mL (120 Units total) into the skin daily with breakfast AND 0.24 mL (120 Units total) daily with lunch AND 0.25 mL (125 Units total) daily with dinner. 24 mL 2    lisinopril 5 MG Oral Tab Take 1 tablet (5 mg total) by mouth daily. 90  tablet 3    atorvastatin 40 MG Oral Tab TAKE 1 TABLET BY MOUTH EVERY DAY AT NIGHT (Patient taking differently: Take 1 tablet (40 mg total) by mouth daily. TAKE 1 TABLET BY MOUTH EVERY DAY AT NIGHT) 90 tablet 3    OZEMPIC, 1 MG/DOSE, 4 MG/3ML Subcutaneous Solution Pen-injector INJECT 1MG INTO THE SKIN ONCE A WEEK 9 mL 1    traZODone 50 MG Oral Tab Take 1 tablet (50 mg total) by mouth nightly as needed for Sleep. 90 tablet 2    fluocinonide 0.05 % External Cream Use twice daily on affected areas, itchy bumps on hands 60 g 3    Insulin Pen Needle 32G X 4 MM Does not apply Misc Inject 3x daily 300 each 0    meclizine 25 MG Oral Tab Take 1 tablet (25 mg total) by mouth 3 (three) times daily as needed for Dizziness. 30 tablet 0    levonorgestrel 20 MCG/24HR Intrauterine IUD Mirena 20 mcg/24 hr (5 years) intrauterine device   Take by intrauterine route.       Were there any changes to your current medication(s) noted on the AVS? Yes  If so, were these medication changes discussed with you prior to leaving the hospital? Yes  If a new medication was prescribed:    Was the new medication's purpose & side effects reviewed? Yes  Do you have any questions about your new medication? No  Did you  your discharge medications when you left the hospital? Yes  Let's go over your medications together to make sure we are not missing anything. Medications Reviewed  Are there any reasons that keep you from taking your medication as prescribed? No  Are you having any concerns with constipation? No      Discharge medications reviewed/discussed/and reconciled against outpatient medications with patient.  Any changes or updates to medications sent to PCP.  Patient Acknowledged     Referrals/orders at D/C:  Referrals/orders placed at D/C? no    Except for Home Health Services mentioned above, have you scheduled these other services? No    DME ordered at D/C? No    The patient has a right ureteral stent placed.     Discharge orders, AVS  reviewed and discussed with patient. Any changes or updates to orders sent to PCP.  Patient Acknowledged      SDOH:   Transportation:    Transportation Needs: No Transportation Needs (8/2/2024)    Transportation Needs     Lack of Transportation: No     Car Seat: Not on file     Financial Strain:    Financial Resource Strain: Low Risk  (8/2/2024)    Financial Resource Strain     Difficulty of Paying Living Expenses: Not hard at all     Med Affordability: No     Follow up appointments:      TCC  Was TCC ordered: No    PCP (If no TCC appointment)  Does patient already have a PCP appointment scheduled? No  NCM Scheduled PCP office TCM appointment with patient on 8/6/2024 with Dr. Wong.      Specialist    Does the patient have any other follow up appointment(s) needing to be scheduled? Yes      The patient is scheduled for a cystoscopy/lithotripsy/sent exchange on 8/20/2024.     Is there any reason as to why you cannot make your appointment(s)?  No     Needs post D/C:   Now that you are home, are there any needs or concerns you need addressed before your next visit with your PCP?  (DME, meds, questions, etc.): Yes; Nurse care manager contacted the hospitalist team to clarify discontinued medications at hospital discharge. The patient held farxiga and metformin while inpatient and discharge paperwork instructed the patient to discontinue the medication returning home.   Nurse care manager confirmed the patient has resumed the home dosing of U-500. The patient reported taking ozempic every Monday.   Nurse care manager confirmed the patient is to continue to hold farxiga and metformin at home. The patient is to resume the U-500 insulin unless experiencing nausea and vomiting. Nurse care manager instructed the patient to continue checking glucose readings closely at home and follow up with the providers with any further questions/concerns. The patient verbalized understanding and agreement.     Interventions by [facility]:     Reviewed all discharge instructions with the patient with a focus on signs/symptoms with a ureteral stent placed, pain/diabetes management, and medication changes. All medications were reviewed and educated on the importance of taking the medications as directed. The hospitalist team was reached for medication confirmation. Patient symptoms were assessed, education was completed for signs/symptoms to monitor, and reviewed when to contact providers versus go to the emergency department/call 911. Appointments were reviewed and stressed the importance of a close follow up with providers. A Transitional Care Management-hospital follow up appointment was scheduled.Confirmed patient has DME (medical equipment). The patient verbalized understanding and will contact the office with any further questions or concerns.       Overall Rating:   How would you rate the care you received while in the hospital? good    CCM referral placed:    Not Applicable    BOOK BY DATE: 8/8/2024

## 2024-08-02 NOTE — TELEPHONE ENCOUNTER
Endocrine Refill protocol for oral and injectable diabetic medications    Protocol Criteria:pass    -Appointment with Endocrinology completed in the last 6 months or scheduled in the next 3 months    -A1c result below 8.5% in the past 6 months      Verify the above has been completed or scheduled in the appropriate timeline. If so can send a 90 day supply with 1 refill.     Last completed office visit: 7/11/2024 Sorin Persaud APRN   Next scheduled Follow up: no future appt     Last A1c result: Last A1c value was 6.3% done 7/11/2024.

## 2024-08-05 NOTE — DISCHARGE SUMMARY
[facility]    PATIENT'S NAME: MARTELL LUGO   ATTENDING PHYSICIAN: Harriet Neil MD   PATIENT ACCOUNT#:   [id number]    LOCATION:  [address] [place] [address]  MEDICAL RECORD #:   [id number]       YOB: 1975  ADMISSION DATE:       07/31/2024      DISCHARGE DATE:  08/01/2024    DISCHARGE SUMMARY    About 35 minutes were spent preparing this discharge.     DISCHARGE DIAGNOSIS:  Kidney stone with hematuria.      HISTORY AND HOSPITAL COURSE:  This is a very pleasant 49-year-old white female who presents with a history of having a 7 mm kidney stone.  She had been seen in the hospital and had previously had a cystoscopy and a stent placement with followup of definitive treatment of the stone after discharge.  She had pain and nausea that was uncontrollable and pretty severe hematuria when she came in.  She was evaluated by Urology.  She was started on antibiotics, but her urine culture was negative.  She felt better.  She was able to eat.  Her pain was controlled.  Her urine even cleared up a little bit, and I discharged her home.      PHYSICAL EXAMINATION:    VITAL SIGNS:  On discharge, temperature is 98.6, pulse 92, respiratory rate 18, blood pressure 127/69, 99%.  LUNGS:  Occasional rhonchi.  HEART:  Normal S1, S2.  No S3.   ABDOMEN:  Soft and nontender.  EXTREMITIES:  Without calf tenderness.  NEUROLOGIC:  She is alert and oriented, friendly and cooperative.     LABORATORY STUDIES:  Please see chart.    ASSESSMENT AND PLAN:    1.   Large kidney stone, status post stent.  Await definitive treatment.    2.   Morbid obesity.  BMI 43.4.  May need ELISABETH workup.    3.   Leukocytosis, probably reactive.  Culture negative.   4.   Diabetes mellitus.    5.   Hypertension.  6.   Asthma.  7.   Hyperlipidemia.  8.   DVT prophylaxis as able given severe hematuria.    CONDITION UPON DISCHARGE:  Stable.    CODE STATUS:  Full Code per chart.  Not discussed with patient.    DIET:  Low-fat, low-salt, 2000-calorie  ADA, 2 g sodium.     FOLLOWUP:  With Dr. Higgins in 3 to 4 weeks.  Call to see Dr. Wong within the next week for followup advice.  Return if fevers, chills, nausea, vomiting, chest pain, shortness of breath, more bleeding, or any other complaints.  Sent home with incentive spirometry.    DISCHARGE MEDICATIONS:    1.   Meclizine 25 mg 3 times a day as needed for dizziness.  2.   Atorvastatin 40 mg daily.  Watch for muscle weakness.   3.   Ciclopirox solution nightly as before.  4.   Cyclobenzaprine 5 mg 3 times a day as needed.   5.   Fluocinonide 0.05% twice a day.   6.   Humulin R U-500, 120 units 3 times a day.  Follow her previous scale.    7.   Mirena 20 mg per 24 hours IUD.  8.   Lisinopril 5 mg daily.  9.   Zofran 4 mg q.4-6 h. as needed for nausea.    10.   Ozempic 1 mg once a week.  11.   Trazodone 50 mg nightly as needed.  Watch for dizziness.  Stop if severe muscle stiffness or fever.  12.   Flomax 0.4 mg daily.  13.   Norco 10/325 one tablet q.6 h. as needed.  Watch for pain.  14.   Tylenol 500 mg q.4-6 h. p.r.n.     RISK OF READMISSION:  Unfortunately high.  TCM followup is recommended.    Dictated By Harriet Neil MD  d: 08/01/2024 17:22:42  t: 08/03/2024 13:37:28  Job [id number]  [facility]/

## 2024-08-06 ENCOUNTER — OFFICE VISIT (OUTPATIENT)
Dept: FAMILY MEDICINE CLINIC | Facility: CLINIC | Age: 49
End: 2024-08-06
Payer: COMMERCIAL

## 2024-08-06 ENCOUNTER — TELEPHONE (OUTPATIENT)
Dept: ENDOCRINOLOGY CLINIC | Facility: CLINIC | Age: 49
End: 2024-08-06

## 2024-08-06 VITALS
BODY MASS INDEX: 43.8 KG/M2 | HEART RATE: 72 BPM | WEIGHT: 247.19 LBS | HEIGHT: 63 IN | DIASTOLIC BLOOD PRESSURE: 83 MMHG | SYSTOLIC BLOOD PRESSURE: 138 MMHG

## 2024-08-06 DIAGNOSIS — E11.9 TYPE 2 DIABETES MELLITUS WITHOUT COMPLICATION, WITH LONG TERM CURRENT USE OF INSULIN PUMP (HCC): ICD-10-CM

## 2024-08-06 DIAGNOSIS — N20.0 KIDNEY STONE: Primary | ICD-10-CM

## 2024-08-06 DIAGNOSIS — Z96.41 TYPE 2 DIABETES MELLITUS WITHOUT COMPLICATION, WITH LONG TERM CURRENT USE OF INSULIN PUMP (HCC): ICD-10-CM

## 2024-08-06 PROCEDURE — 3008F BODY MASS INDEX DOCD: CPT | Performed by: FAMILY MEDICINE

## 2024-08-06 PROCEDURE — 3079F DIAST BP 80-89 MM HG: CPT | Performed by: FAMILY MEDICINE

## 2024-08-06 PROCEDURE — 99495 TRANSJ CARE MGMT MOD F2F 14D: CPT | Performed by: FAMILY MEDICINE

## 2024-08-06 PROCEDURE — 3075F SYST BP GE 130 - 139MM HG: CPT | Performed by: FAMILY MEDICINE

## 2024-08-06 NOTE — PROGRESS NOTES
Subjective:   Kay Tracy is a 49 year old female who presents for hospital follow up.   She was discharged from [facility] to Home or Self Care  Admission Date: 7/31/24   Discharge Date: 8/1/24  Hospital Discharge Diagnosis: kidney stones     Interactive contact within 2 business days post discharge first initiated on Date: 8/2/2024    During the visit, the following was completed:  Obtained and reviewed discharge summary, continuity of care documents, and Hospitalist notes  Reviewed Labs (CBC, CMP)    HPI: thought she had food poisoning in may and upset stomach with vomiting for month of June.   Discharge summary  \"HISTORY AND HOSPITAL COURSE:  This is a very pleasant 49-year-old white female who presents with a history of having a 7 mm kidney stone.  She had been seen in the hospital and had previously had a cystoscopy and a stent placement with followup of definitive treatment of the stone after discharge.  She had pain and nausea that was uncontrollable and pretty severe hematuria when she came in.  She was evaluated by Urology.  She was started on antibiotics, but her urine culture was negative.  She felt better.  She was able to eat.  Her pain was controlled.  Her urine even cleared up a little bit, and I discharged her home.       PHYSICAL EXAMINATION:    VITAL SIGNS:  On discharge, temperature is 98.6, pulse 92, respiratory rate 18, blood pressure 127/69, 99%.  LUNGS:  Occasional rhonchi.  HEART:  Normal S1, S2.  No S3.   ABDOMEN:  Soft and nontender.  EXTREMITIES:  Without calf tenderness.  NEUROLOGIC:  She is alert and oriented, friendly and cooperative.      LABORATORY STUDIES:  Please see chart.     ASSESSMENT AND PLAN:    1.       Large kidney stone, status post stent.  Await definitive treatment.    2.       Morbid obesity.  BMI 43.4.  May need ELISABETH workup.    3.       Leukocytosis, probably reactive.  Culture negative.   4.       Diabetes mellitus.    5.       Hypertension.  6.        Asthma.  7.       Hyperlipidemia.  8.       DVT prophylaxis as able given severe hematuria.     CONDITION UPON DISCHARGE:  Stable.     CODE STATUS:  Full Code per chart.  Not discussed with patient.     DIET:  Low-fat, low-salt, 2000-calorie ADA, 2 g sodium.      FOLLOWUP:  With Dr. Higgins in 3 to 4 weeks.  Call to see Dr. Wong within the next week for followup advice.  Return if fevers, chills, nausea, vomiting, chest pain, shortness of breath, more bleeding, or any other complaints.  Sent home with incentive spirometry.\"    unable to remove stone so has procedure again in few weeks.   No fevers or chills. No vomiting since left the hospital. All of her diabetes medications were stopped because of this except the insulin.   History/Other:   Current Medications:  Medication Reconciliation:  I am aware of an inpatient discharge within the last 30 days.  The discharge medication list has been reconciled with the patient's current medication list and reviewed by me.  See medication list for additions of new medication, and changes to current doses of medications and discontinued medications.  Outpatient Medications Marked as Taking for the 8/6/24 encounter (Office Visit) with Jessica Wong MD   Medication Sig    OZEMPIC, 1 MG/DOSE, 4 MG/3ML Subcutaneous Solution Pen-injector INJECT 1 MG INTO THE SKIN ONE TIME PER WEEK    HYDROcodone-acetaminophen  MG Oral Tab Take 1 tablet by mouth every 6 (six) hours as needed for Pain. Watch drowsiness no alcohol    acetaminophen 500 MG Oral Tab Take 1 tablet (500 mg total) by mouth every 4 (four) hours as needed for Fever (equal to or greater than 100.4).    tamsulosin 0.4 MG Oral Cap Take 1 capsule (0.4 mg total) by mouth daily. Take 1/2 hour following the same meal each day    Ciclopirox 8 % External Solution Apply 1 mL topically nightly.    Continuous Glucose Sensor (DEXCOM G7 SENSOR) Does not apply Misc 1 each Every 10 days.    insulin regular human, conc, (HUMULIN R  U-500 KWIKPEN) 500 UNIT/ML Subcutaneous Solution Pen-injector Inject 0.24 mL (120 Units total) into the skin daily with breakfast AND 0.24 mL (120 Units total) daily with lunch AND 0.25 mL (125 Units total) daily with dinner.    lisinopril 5 MG Oral Tab Take 1 tablet (5 mg total) by mouth daily.    atorvastatin 40 MG Oral Tab TAKE 1 TABLET BY MOUTH EVERY DAY AT NIGHT (Patient taking differently: Take 1 tablet (40 mg total) by mouth daily. TAKE 1 TABLET BY MOUTH EVERY DAY AT NIGHT)    Insulin Pen Needle 32G X 4 MM Does not apply Misc Inject 3x daily    levonorgestrel 20 MCG/24HR Intrauterine IUD Mirena 20 mcg/24 hr (5 years) intrauterine device   Take by intrauterine route.       Review of Systems     Negative except see HPI     Objective:   Physical Exam  Constitutional:       Appearance: She is well-developed.   Cardiovascular:      Rate and Rhythm: Normal rate and regular rhythm.      Heart sounds: Normal heart sounds.   Pulmonary:      Effort: Pulmonary effort is normal.      Breath sounds: Normal breath sounds.   Abdominal:      General: Bowel sounds are normal.      Palpations: Abdomen is soft.      Comments: Mild tenderness right flank and upper abdomen. No lower abdominal tenderness    Neurological:      Mental Status: She is alert and oriented to person, place, and time.      Deep Tendon Reflexes: Reflexes are normal and symmetric.         /83 (BP Location: Right arm, Patient Position: Sitting, Cuff Size: large)   Pulse (!) 2   Ht 5' 3\" (1.6 m)   Wt 247 lb 3.2 oz (112.1 kg)   BMI 43.79 kg/m²  Estimated body mass index is 43.79 kg/m² as calculated from the following:    Height as of this encounter: 5' 3\" (1.6 m).    Weight as of this encounter: 247 lb 3.2 oz (112.1 kg).    Assessment & Plan:   1. Kidney stone  Continue large amounts of water.   - Urology Referral - In Network    2. Type 2 diabetes mellitus without complication, with long term current use of insulin pump (HCC)    - Ophthalmology  Referral - In Network      Return if symptoms worsen or fail to improve.

## 2024-08-06 NOTE — TELEPHONE ENCOUNTER
Patient requesting to speak with nurse Linder regarding medication questions. Please call at [phone],thanks.

## 2024-08-08 NOTE — TELEPHONE ENCOUNTER
Niyah -- please see pt's MC message below.     Left voice message for pt to call back and pt MC message to inquire if pt has any S&S of hyperglycemia.    97.5

## 2024-08-08 NOTE — TELEPHONE ENCOUNTER
Spoke with pt.   States she will send  with most recent BG readings from glucometer when she is home  States she took her Ozempic this past Monday 8/5 but will need to skip next dose d/t upcoming procedure.  States she has not been as hungry following surgery. States she has tried to avoid excess carbs and eat protein with meals

## 2024-08-08 NOTE — TELEPHONE ENCOUNTER
Niyah HENNING,    Patient recently admitted due to kidney stones. Was told to stop the following medications:    metformin 2000 mg daily stopped on 7/29   farxiga 10 mg daily stopped on 7/25  Ozempic 1mg held the week before the surgery and will be holding it again one week prior 8/20    Reports compliance with     U500   120 units Breakfast  120 units with lunch   115 units dinner     BG target per LOV:  Before breakfast:  (preferably < 110)  Before meals OR 2 hours after meals: <180 (preferably <150)     See MC message from patient:    \"Fasting Sunday - 228  Before dinner - 140  Before bed - 178     Fasting Monday - 202  Before dinner - 148  Before bed - 155     Fasting Tuesday - 133  Before dinner - 200  Before bed - 168     Fasting Wednesday - 138  Before dinner - 122  Before bed - 153     Fasting Thursday - missed  After lunch - 218\"

## 2024-08-11 ENCOUNTER — PATIENT MESSAGE (OUTPATIENT)
Dept: FAMILY MEDICINE CLINIC | Facility: CLINIC | Age: 49
End: 2024-08-11

## 2024-08-11 DIAGNOSIS — N20.0 RIGHT KIDNEY STONE: ICD-10-CM

## 2024-08-12 ENCOUNTER — PATIENT MESSAGE (OUTPATIENT)
Dept: FAMILY MEDICINE CLINIC | Facility: CLINIC | Age: 49
End: 2024-08-12

## 2024-08-12 ENCOUNTER — TELEPHONE (OUTPATIENT)
Dept: SURGERY | Facility: CLINIC | Age: 49
End: 2024-08-12

## 2024-08-12 DIAGNOSIS — N20.0 RIGHT KIDNEY STONE: ICD-10-CM

## 2024-08-12 RX ORDER — HYDROCODONE BITARTRATE AND ACETAMINOPHEN 5; 325 MG/1; MG/1
1 TABLET ORAL EVERY 8 HOURS PRN
Qty: 20 TABLET | Refills: 0 | Status: SHIPPED | OUTPATIENT
Start: 2024-08-12 | End: 2024-08-17

## 2024-08-12 NOTE — TELEPHONE ENCOUNTER
- s/w pt; pt identity verified with name and   - pt c/o intermittent pain, N/V.  Zofran is helping with the nausea, but does not feel that the hydrocodone is ameliorating her pain.  Pt denies fever/chills.  Pain is located in the lower right abdomen and back.    - Pt requesting surgery sooner than 24 as she is feeling extremely uncomfortable.  I instructed pt to ensure increased hydration and to go to ER if she develops a fever, or pain is unbearable.  I explained that I would inform Dr. Higgins of her symptoms, and see if she is able to move up pt's surgery.  I did explain that it was the OR that made the surgical scheduling and thus may not have any earlier openings.  - Pt acknowledged understanding and denied further questions.  - routed to AR

## 2024-08-12 NOTE — TELEPHONE ENCOUNTER
Patient has surgery scheduled on 8/20, states she has been having more pain and has been vomiting, unsure she can wait another week. Please call thank you.

## 2024-08-12 NOTE — TELEPHONE ENCOUNTER
-S/w pt; identity verified with name & .  -I read message to pt from AR as stated below.  -Pt is \"trying to avoid going to the ER.\"  -She was made aware next available surgery appt is now 24.  -Encounter complete.

## 2024-08-12 NOTE — TELEPHONE ENCOUNTER
From: Kay Tracy  To: Jessica Darlinecornell Wong  Sent: 8/11/2024 9:04 PM CDT  Subject: Pain medication question    Hi - I ran out of the hydrocodone 5-325 mg pills (I was given 10). I am still having some pain and vomiting episodes. I do have the  mg but I think maybe I don’t need them that strong? Or should I just take these stronger ones? Regular extra strength Tylenol is not helping though. Please let me know what I should do. My procedure is not until the 20th, so I’m anticipating more pain, ugh. Thank you!

## 2024-08-12 NOTE — TELEPHONE ENCOUNTER
Dr Wong, patient called, Urology cannot do her surgery for the right sided kidney stone until 8/20.  She has run out of Norco 5 and is asking for refill to hold her over until the surgery.  Please advise, uses [facility] [place].

## 2024-08-13 RX ORDER — HYDROCODONE BITARTRATE AND ACETAMINOPHEN 5; 325 MG/1; MG/1
1 TABLET ORAL EVERY 8 HOURS PRN
Qty: 20 TABLET | Refills: 0 | Status: SHIPPED | OUTPATIENT
Start: 2024-08-13 | End: 2024-08-18

## 2024-08-13 NOTE — TELEPHONE ENCOUNTER
Patient calling regarding message.  Patient states [facility] does not have hydrocodone in stock.  Patient called [facility] in [place], and states they have in stock.  Prescription pended, pharmacy updated. Please advsie.      Medication Quantity Refills Start End   HYDROcodone-acetaminophen 5-325 MG Oral Tab 20 tablet 0 8/12/2024 8/17/2024   Sig:   Take 1 tablet by mouth every 8 (eight) hours as needed for Pain.     Route:   Oral     PRN Reason(s):   Pain     Earliest Fill Date:   8/12/2024     Order #:   [id number]

## 2024-08-14 ENCOUNTER — TELEPHONE (OUTPATIENT)
Dept: SURGERY | Facility: CLINIC | Age: 49
End: 2024-08-14

## 2024-08-14 NOTE — TELEPHONE ENCOUNTER
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 I spoke with my HR person and she said I need a letter stating that I will miss my first day of work due to a medical procedure. If I need to miss more than just that day, I will need that in the letter as well. I work for the [employer] [place] [address], if you need that information. Thank you!

## 2024-08-16 NOTE — TELEPHONE ENCOUNTER
Patient calling following up with her [email or web address] message. Patient need work note by today.Please advise

## 2024-08-19 NOTE — DISCHARGE INSTRUCTIONS
You should expect to have some blood in your urine, burning when you pee, urgency and frequency. This is normal. If you have a fever >101F, chills, nausea, vomiting, inability to urinate please go to the emergency room for evaluation.   Pain is normal after this procedure. Try to take Tylenol ibuprofen and use hot packs. Drink as much water as possible. Additionally you can try over the counter pyridium if you notice bladder discomfort.    We have also prescribed you 3 days of an antibiotic that I request that you take.    You should remove your stent in 7 days. It is taped to the top part of your groin. You can take the tape off and pull the black string. You should see 2 curls of the stent when it is removed. If you do not feel comfortable removing it yourself you should contact us and we can help arrange follow up. Please BE CAREFUL not to inadvertently pull on the black string (stent) taped to your groin. This may cause the stent to dislodge slightly and lead to continuous urine leakage (incontinence). We will ask you to wear a pad or depend if this happens until your stent can come out (7 days).    You have a follow up in 6 to 8 weeks with a renal bladder ultrasound and follow-up with your physician or a nurse practitioner. This is to make sure that your kidney still looks healthy.    Stone prevention methods including hydration (until urine is clear), limiting salt and red meat/meat intake, eating a normal calcium diet, and drinking lemon with water. We also talked about reasons to go to the emergency room - namely acute flank pain associated with fevers, chills, nausea or vomiting.   Call T: [phone] if you have any questions or concerns       HOME INSTRUCTIONS  AMBSURG HOME CARE INSTRUCTIONS: POST-OP ANESTHESIA  The medication that you received for sedation or general anesthesia can last up to 24 hours. Your judgment and reflexes may be altered, even if you feel like your normal self.      We Recommend:    Do not drive any motor vehicle or bicycle   Avoid mowing the lawn, playing sports, or working with power tools/applicances (power saws, electric knives or mixers)   That you have someone stay with you on your first night home   Do not drink alcohol or take sleeping pills or tranquilizers   Do not sign legal documents within 24 hours of your procedure   If you had a nerve block for your surgery, take extra care not to put any pressure on your arm or hand for 24 hours    It is normal:  For you to have a sore throat if you had a breathing tube during surgery (while you were asleep!). The sore throat should get better within 48 hours. You can gargle with warm salt water (1/2 tsp in 4 oz warm water) or use a throat lozenge for comfort  To feel muscle aches or soreness especially in the abdomen, chest or neck. The achy feeling should go away in the next 24 hours  To feel weak, sleepy or \"wiped out\". Your should start feeling better in the next 24 hours.   To experience mild discomforts such as sore lip or tongue, headache, cramps, gas pains or a bloated feeling in your abdomen.   To experience mild back pain or soreness for a day or two if you had spinal or epidural anesthesia.   If you had laparoscopic surgery, to feel shoulder pain or discomfort on the day of surgery.   For some patients to have nausea after surgery/anesthesia    If you feel nausea or experience vomiting:   Try to move around less.   Eat less than usual or drink only liquids until the next morning   Nausea should resolve in about 24 hours    If you have a problem when you are at home:    Call your surgeons office   Discharge Instructions: After Your Surgery  You’ve just had surgery. During surgery, you were given medicine called anesthesia to keep you relaxed and free of pain. After surgery, you may have some pain or nausea. This is common. Here are some tips for feeling better and getting well after surgery.   Going home  Your healthcare provider  will show you how to take care of yourself when you go home. They'll also answer your questions. Have an adult family member or friend drive you home. For the first 24 hours after your surgery:   Don't drive or use heavy equipment.  Don't make important decisions or sign legal papers.  Take medicines as directed.  Don't drink alcohol.  Have someone stay with you, if needed. They can watch for problems and help keep you safe.  Be sure to go to all follow-up visits with your healthcare provider. And rest after your surgery for as long as your provider tells you to.   Coping with pain  If you have pain after surgery, pain medicine will help you feel better. Take it as directed, before pain becomes severe. Also, ask your healthcare provider or pharmacist about other ways to control pain. This might be with heat, ice, or relaxation. And follow any other instructions your surgeon or nurse gives you.      Stay on schedule with your medicine.     Tips for taking pain medicine  To get the best relief possible, remember these points:   Pain medicines can upset your stomach. Taking them with a little food may help.  Most pain relievers taken by mouth need at least 20 to 30 minutes to start to work.  Don't wait till your pain becomes severe before you take your medicine. Try to time your medicine so that you can take it before starting an activity. This might be before you get dressed, go for a walk, or sit down for dinner.  Constipation is a common side effect of some pain medicines. Call your healthcare provider before taking any medicines such as laxatives or stool softeners to help ease constipation. Also ask if you should skip any foods. Drinking lots of fluids and eating foods such as fruits and vegetables that are high in fiber can also help. Remember, don't take laxatives unless your surgeon has prescribed them.  Drinking alcohol and taking pain medicine can cause dizziness and slow your breathing. It can even be  deadly. Don't drink alcohol while taking pain medicine.  Pain medicine can make you react more slowly to things. Don't drive or run machinery while taking pain medicine.  Your healthcare provider may tell you to take acetaminophen to help ease your pain. Ask them how much you're supposed to take each day. Acetaminophen or other pain relievers may interact with your prescription medicines or other over-the-counter (OTC) medicines. Some prescription medicines have acetaminophen and other ingredients in them. Using both prescription and OTC acetaminophen for pain can cause you to accidentally overdose. Read the labels on your OTC medicines with care. This will help you to clearly know the list of ingredients, how much to take, and any warnings. It may also help you not take too much acetaminophen. If you have questions or don't understand the information, ask your pharmacist or healthcare provider to explain it to you before you take the OTC medicine.   Managing nausea  Some people have an upset stomach (nausea) after surgery. This is often because of anesthesia, pain, or pain medicine, less movement of food in the stomach, or the stress of surgery. These tips will help you handle nausea and eat healthy foods as you get better. If you were on a special food plan before surgery, ask your healthcare provider if you should follow it while you get better. Check with your provider on how your eating should progress. It may depend on the surgery you had. These general tips may help:   Don't push yourself to eat. Your body will tell you when to eat and how much.  Start off with clear liquids and soup. They're easier to digest.  Next try semi-solid foods as you feel ready. These include mashed potatoes, applesauce, and gelatin.  Slowly move to solid foods. Don’t eat fatty, rich, or spicy foods at first.  Don't force yourself to have 3 large meals a day. Instead eat smaller amounts more often.  Take pain medicines with a small  amount of solid food, such as crackers or toast. This helps prevent nausea.  When to call your healthcare provider  Call your healthcare provider right away if you have any of these:   You still have too much pain, or the pain gets worse, after taking the medicine. The medicine may not be strong enough. Or there may be a complication from the surgery.  You feel too sleepy, dizzy, or groggy. The medicine may be too strong.  Side effects such as nausea or vomiting. Your healthcare provider may advise taking other medicines to .  Skin changes such as rash, itching, or hives. This may mean you have an allergic reaction. Your provider may advise taking other medicines.  The incision looks different (for instance, part of it opens up).  Bleeding or fluid leaking from the incision site, and weren't told to expect that.  Fever of 100.4°F (38°C) or higher, or as directed by your provider.  Call 911  Call 911 right away if you have:   Trouble breathing  Facial swelling    If you have obstructive sleep apnea   You were given anesthesia medicine during surgery to keep you comfortable and free of pain. After surgery, you may have more apnea spells because of this medicine and other medicines you were given. The spells may last longer than normal.    At home:  Keep using the continuous positive airway pressure (CPAP) device when you sleep. Unless your healthcare provider tells you not to, use it when you sleep, day or night. CPAP is a common device used to treat obstructive sleep apnea.  Talk with your provider before taking any pain medicine, muscle relaxants, or sedatives. Your provider will tell you about the possible dangers of taking these medicines.  Contact your provider if your sleeping changes a lot even when taking medicines as directed.  Kirt last reviewed this educational content on 10/1/2021  © 6684-3717 [employer]. All rights reserved. This information is not intended as a substitute for  professional medical care. Always follow your healthcare professional's instructions.

## 2024-08-20 ENCOUNTER — ANESTHESIA (OUTPATIENT)
Dept: SURGERY | Facility: HOSPITAL | Age: 49
End: 2024-08-20
Payer: COMMERCIAL

## 2024-08-20 ENCOUNTER — ANESTHESIA EVENT (OUTPATIENT)
Dept: SURGERY | Facility: HOSPITAL | Age: 49
End: 2024-08-20
Payer: COMMERCIAL

## 2024-08-20 ENCOUNTER — APPOINTMENT (OUTPATIENT)
Dept: GENERAL RADIOLOGY | Facility: HOSPITAL | Age: 49
End: 2024-08-20
Attending: UROLOGY
Payer: COMMERCIAL

## 2024-08-20 ENCOUNTER — TELEPHONE (OUTPATIENT)
Dept: SURGERY | Facility: CLINIC | Age: 49
End: 2024-08-20

## 2024-08-20 ENCOUNTER — HOSPITAL ENCOUNTER (OUTPATIENT)
Facility: HOSPITAL | Age: 49
Setting detail: HOSPITAL OUTPATIENT SURGERY
Discharge: HOME | End: 2024-08-20
Attending: UROLOGY | Admitting: UROLOGY
Payer: COMMERCIAL

## 2024-08-20 VITALS
HEIGHT: 63 IN | BODY MASS INDEX: 42.7 KG/M2 | OXYGEN SATURATION: 90 % | SYSTOLIC BLOOD PRESSURE: 134 MMHG | TEMPERATURE: 98 F | HEART RATE: 87 BPM | WEIGHT: 241 LBS | RESPIRATION RATE: 18 BRPM | DIASTOLIC BLOOD PRESSURE: 80 MMHG

## 2024-08-20 DIAGNOSIS — N20.0 NEPHROLITHIASIS: ICD-10-CM

## 2024-08-20 DIAGNOSIS — N20.0 RIGHT KIDNEY STONE: Primary | ICD-10-CM

## 2024-08-20 LAB
B-HCG UR QL: NEGATIVE
GLUCOSE BLDC GLUCOMTR-MCNC: 128 MG/DL (ref 70–99)
GLUCOSE BLDC GLUCOMTR-MCNC: 152 MG/DL (ref 70–99)

## 2024-08-20 PROCEDURE — 0TC68ZZ EXTIRPATION OF MATTER FROM RIGHT URETER, VIA NATURAL OR ARTIFICIAL OPENING ENDOSCOPIC: ICD-10-PCS | Performed by: UROLOGY

## 2024-08-20 PROCEDURE — 0T768DZ DILATION OF RIGHT URETER WITH INTRALUMINAL DEVICE, VIA NATURAL OR ARTIFICIAL OPENING ENDOSCOPIC: ICD-10-PCS | Performed by: UROLOGY

## 2024-08-20 PROCEDURE — 52356 CYSTO/URETERO W/LITHOTRIPSY: CPT | Performed by: UROLOGY

## 2024-08-20 PROCEDURE — 74420 UROGRAPHY RTRGR +-KUB: CPT | Performed by: UROLOGY

## 2024-08-20 DEVICE — URETERAL STENT
Type: IMPLANTABLE DEVICE | Site: URETER | Status: FUNCTIONAL
Brand: ASCERTA™

## 2024-08-20 RX ORDER — LIDOCAINE HYDROCHLORIDE 20 MG/ML
JELLY TOPICAL AS NEEDED
Status: DISCONTINUED | OUTPATIENT
Start: 2024-08-20 | End: 2024-08-20 | Stop reason: HOSPADM

## 2024-08-20 RX ORDER — METOCLOPRAMIDE HYDROCHLORIDE 5 MG/ML
10 INJECTION INTRAMUSCULAR; INTRAVENOUS ONCE
Status: COMPLETED | OUTPATIENT
Start: 2024-08-20 | End: 2024-08-20

## 2024-08-20 RX ORDER — PHENYLEPHRINE HCL 10 MG/ML
VIAL (ML) INJECTION AS NEEDED
Status: DISCONTINUED | OUTPATIENT
Start: 2024-08-20 | End: 2024-08-20 | Stop reason: SURG

## 2024-08-20 RX ORDER — DEXAMETHASONE SODIUM PHOSPHATE 4 MG/ML
VIAL (ML) INJECTION AS NEEDED
Status: DISCONTINUED | OUTPATIENT
Start: 2024-08-20 | End: 2024-08-20 | Stop reason: SURG

## 2024-08-20 RX ORDER — METOCLOPRAMIDE 10 MG/1
10 TABLET ORAL ONCE
Status: COMPLETED | OUTPATIENT
Start: 2024-08-20 | End: 2024-08-20

## 2024-08-20 RX ORDER — TAMSULOSIN HYDROCHLORIDE 0.4 MG/1
0.4 CAPSULE ORAL DAILY
Qty: 30 CAPSULE | Refills: 0 | Status: SHIPPED | OUTPATIENT
Start: 2024-08-20 | End: 2024-09-19

## 2024-08-20 RX ORDER — HYDROMORPHONE HYDROCHLORIDE 1 MG/ML
0.2 INJECTION, SOLUTION INTRAMUSCULAR; INTRAVENOUS; SUBCUTANEOUS EVERY 5 MIN PRN
Status: DISCONTINUED | OUTPATIENT
Start: 2024-08-20 | End: 2024-08-20

## 2024-08-20 RX ORDER — ACETAMINOPHEN 500 MG
1000 TABLET ORAL ONCE
Status: COMPLETED | OUTPATIENT
Start: 2024-08-20 | End: 2024-08-20

## 2024-08-20 RX ORDER — SODIUM CHLORIDE, SODIUM LACTATE, POTASSIUM CHLORIDE, CALCIUM CHLORIDE 600; 310; 30; 20 MG/100ML; MG/100ML; MG/100ML; MG/100ML
INJECTION, SOLUTION INTRAVENOUS CONTINUOUS PRN
Status: DISCONTINUED | OUTPATIENT
Start: 2024-08-20 | End: 2024-08-20 | Stop reason: SURG

## 2024-08-20 RX ORDER — SODIUM CHLORIDE 9 MG/ML
INJECTION, SOLUTION INTRAVENOUS CONTINUOUS
Status: DISCONTINUED | OUTPATIENT
Start: 2024-08-20 | End: 2024-08-20

## 2024-08-20 RX ORDER — SODIUM CHLORIDE, SODIUM LACTATE, POTASSIUM CHLORIDE, CALCIUM CHLORIDE 600; 310; 30; 20 MG/100ML; MG/100ML; MG/100ML; MG/100ML
INJECTION, SOLUTION INTRAVENOUS CONTINUOUS
Status: DISCONTINUED | OUTPATIENT
Start: 2024-08-20 | End: 2024-08-20

## 2024-08-20 RX ORDER — MIDAZOLAM HYDROCHLORIDE 1 MG/ML
INJECTION INTRAMUSCULAR; INTRAVENOUS AS NEEDED
Status: DISCONTINUED | OUTPATIENT
Start: 2024-08-20 | End: 2024-08-20 | Stop reason: SURG

## 2024-08-20 RX ORDER — KETOROLAC TROMETHAMINE 30 MG/ML
30 INJECTION, SOLUTION INTRAMUSCULAR; INTRAVENOUS ONCE
Status: COMPLETED | OUTPATIENT
Start: 2024-08-20 | End: 2024-08-20

## 2024-08-20 RX ORDER — DEXTROSE MONOHYDRATE 25 G/50ML
50 INJECTION, SOLUTION INTRAVENOUS
Status: DISCONTINUED | OUTPATIENT
Start: 2024-08-20 | End: 2024-08-20

## 2024-08-20 RX ORDER — LIDOCAINE HYDROCHLORIDE 10 MG/ML
INJECTION, SOLUTION EPIDURAL; INFILTRATION; INTRACAUDAL; PERINEURAL AS NEEDED
Status: DISCONTINUED | OUTPATIENT
Start: 2024-08-20 | End: 2024-08-20 | Stop reason: SURG

## 2024-08-20 RX ORDER — MORPHINE SULFATE 4 MG/ML
2 INJECTION, SOLUTION INTRAMUSCULAR; INTRAVENOUS EVERY 10 MIN PRN
Status: DISCONTINUED | OUTPATIENT
Start: 2024-08-20 | End: 2024-08-20

## 2024-08-20 RX ORDER — KETOROLAC TROMETHAMINE 10 MG/1
10 TABLET, FILM COATED ORAL EVERY 6 HOURS PRN
Qty: 5 TABLET | Refills: 0 | Status: SHIPPED | OUTPATIENT
Start: 2024-08-20

## 2024-08-20 RX ORDER — NICOTINE POLACRILEX 4 MG
15 LOZENGE BUCCAL
Status: DISCONTINUED | OUTPATIENT
Start: 2024-08-20 | End: 2024-08-20

## 2024-08-20 RX ORDER — NALOXONE HYDROCHLORIDE 0.4 MG/ML
0.08 INJECTION, SOLUTION INTRAMUSCULAR; INTRAVENOUS; SUBCUTANEOUS AS NEEDED
Status: DISCONTINUED | OUTPATIENT
Start: 2024-08-20 | End: 2024-08-20

## 2024-08-20 RX ORDER — HYDROMORPHONE HYDROCHLORIDE 1 MG/ML
0.4 INJECTION, SOLUTION INTRAMUSCULAR; INTRAVENOUS; SUBCUTANEOUS EVERY 5 MIN PRN
Status: DISCONTINUED | OUTPATIENT
Start: 2024-08-20 | End: 2024-08-20

## 2024-08-20 RX ORDER — MORPHINE SULFATE 4 MG/ML
4 INJECTION, SOLUTION INTRAMUSCULAR; INTRAVENOUS EVERY 10 MIN PRN
Status: DISCONTINUED | OUTPATIENT
Start: 2024-08-20 | End: 2024-08-20

## 2024-08-20 RX ORDER — FAMOTIDINE 10 MG/ML
20 INJECTION, SOLUTION INTRAVENOUS ONCE
Status: COMPLETED | OUTPATIENT
Start: 2024-08-20 | End: 2024-08-20

## 2024-08-20 RX ORDER — FAMOTIDINE 20 MG/1
20 TABLET, FILM COATED ORAL ONCE
Status: COMPLETED | OUTPATIENT
Start: 2024-08-20 | End: 2024-08-20

## 2024-08-20 RX ORDER — MORPHINE SULFATE 10 MG/ML
6 INJECTION, SOLUTION INTRAMUSCULAR; INTRAVENOUS EVERY 10 MIN PRN
Status: DISCONTINUED | OUTPATIENT
Start: 2024-08-20 | End: 2024-08-20

## 2024-08-20 RX ORDER — SULFAMETHOXAZOLE/TRIMETHOPRIM 800-160 MG
1 TABLET ORAL 2 TIMES DAILY
Qty: 6 TABLET | Refills: 0 | Status: SHIPPED | OUTPATIENT
Start: 2024-08-20 | End: 2024-08-23

## 2024-08-20 RX ORDER — ONDANSETRON 2 MG/ML
INJECTION INTRAMUSCULAR; INTRAVENOUS AS NEEDED
Status: DISCONTINUED | OUTPATIENT
Start: 2024-08-20 | End: 2024-08-20 | Stop reason: SURG

## 2024-08-20 RX ORDER — HYDROMORPHONE HYDROCHLORIDE 1 MG/ML
0.6 INJECTION, SOLUTION INTRAMUSCULAR; INTRAVENOUS; SUBCUTANEOUS EVERY 5 MIN PRN
Status: DISCONTINUED | OUTPATIENT
Start: 2024-08-20 | End: 2024-08-20

## 2024-08-20 RX ORDER — NICOTINE POLACRILEX 4 MG
30 LOZENGE BUCCAL
Status: DISCONTINUED | OUTPATIENT
Start: 2024-08-20 | End: 2024-08-20

## 2024-08-20 RX ADMIN — ONDANSETRON 4 MG: 2 INJECTION INTRAMUSCULAR; INTRAVENOUS at 08:44:00

## 2024-08-20 RX ADMIN — DEXAMETHASONE SODIUM PHOSPHATE 8 MG: 4 MG/ML VIAL (ML) INJECTION at 08:44:00

## 2024-08-20 RX ADMIN — PHENYLEPHRINE HCL 100 MCG: 10 MG/ML VIAL (ML) INJECTION at 08:47:00

## 2024-08-20 RX ADMIN — PHENYLEPHRINE HCL 100 MCG: 10 MG/ML VIAL (ML) INJECTION at 08:51:00

## 2024-08-20 RX ADMIN — LIDOCAINE HYDROCHLORIDE 50 MG: 10 INJECTION, SOLUTION EPIDURAL; INFILTRATION; INTRACAUDAL; PERINEURAL at 08:44:00

## 2024-08-20 RX ADMIN — PHENYLEPHRINE HCL 100 MCG: 10 MG/ML VIAL (ML) INJECTION at 08:53:00

## 2024-08-20 RX ADMIN — MIDAZOLAM HYDROCHLORIDE 2 MG: 1 INJECTION INTRAMUSCULAR; INTRAVENOUS at 08:44:00

## 2024-08-20 RX ADMIN — SODIUM CHLORIDE, SODIUM LACTATE, POTASSIUM CHLORIDE, CALCIUM CHLORIDE: 600; 310; 30; 20 INJECTION, SOLUTION INTRAVENOUS at 08:44:00

## 2024-08-20 NOTE — H&P
PRE-OP NOTE     NAME/MRN/PROCEDURE: Kay Tracy - cystoscopy, right urs/ll/stent  HPI: 49F with obstructing stone 5mm right proximal who was counseled on definitive ureteroscopy versus Met. She opted for URS. Underwent stent placement on  as we were unable to pass the scope.  PMH: Rhinitis, asthma, diabetes, eczema, high blood pressure, high cholesterol, infertility, obesity, PCOS, shoulder tendinitis  PSH: Adenoidectomy, , carpal tunnel, cataract, D&C, laparoscopic appendectomy, reduction of large breast, tonsillectomy  MEDS: Atorvastatin, continuous glucose sensor, cyclobenzaprine, dapagliflozin, flu sick denied, hydrocodone acetaminophen, insulin, Toradol, levonorgestrel, lisinopril, meclizine, metformin, ondansetron, Ozempic, tamsulosin, trazodone  ALL: Cat dander, iodine, seasonal, nickel  LABS: No growth, WBC 11.1, Cr 0.75,   IMAGINcm    OTHER: CONSTITUTIONAL: No apparent distress, cooperative and communicative  NEUROLOGIC: Alert and oriented   HEAD: Normocephalic, atraumatic   EYES: Sclera non-icteric   ENT: Hearing intact, moist mucous membranes   NECK: No obvious goiter or masses   RESPIRATORY: Normal respiratory effort, Nonlabored breathing on room air  SKIN: No evident rashes   ABDOMEN: Soft, nontender, nondistended, no rebound tenderness, no guarding, no masses   ABX: ampicillin ceftriaxone     FAMILY HISTORY:  Family History             Family History   Problem Relation Age of Onset    Hypertension Father      Obesity Father      Cancer Paternal Grandfather           Esophageal cancer    Diabetes Paternal Grandmother      Stroke Paternal Grandmother      Cancer Son           Medulloblastoma    Glaucoma Neg      Macular degeneration Neg              SOCIAL HISTORY:  Short Social Hx on File[]Expand by Default   Social History                Socioeconomic History    Marital status:     Number of children: 1   Occupational History    Occupation: speech therapist   Tobacco Use     Smoking status: Never    Smokeless tobacco: Never   Vaping Use    Vaping status: Never Used   Substance and Sexual Activity    Alcohol use: Never    Drug use: Never    Sexual activity: Yes   Other Topics Concern    Caffeine Concern Yes       Comment: soda-1 cup/day    Reaction to local anesthetic No    Pt has a pacemaker No    Pt has a defibrillator No

## 2024-08-20 NOTE — ANESTHESIA PREPROCEDURE EVALUATION
Anesthesia PreOp Note    HPI:     Kay Tracy is a 49 year old female who presents for preoperative consultation requested by: Raquel Higgins MD    Date of Surgery: 8/20/2024    Procedure(s):  Cystoscopy right ureteroscopy, laser lithotripsy, right retrograde pyelogram, right stent exchange  Cystoscopy retrograde  Cystoscopy with holmium laser  Cystoscopy stent insertion  Indication: Nephrolithiasis [N20.0]    Relevant Problems   No relevant active problems       NPO:                         History Review:  Patient Active Problem List    Diagnosis Date Noted    Kidney stone on right side 07/31/2024    Metabolic acidosis 07/31/2024    Right kidney stone 07/31/2024    Ureteral stent present 07/31/2024    Intractable pain 07/31/2024    Posterior subcapsular age-related cataract of right eye 04/25/2023    Age-related nuclear cataract, left 04/25/2023    Diabetes mellitus type 2 without retinopathy (HCC) 04/25/2023    Anxiety 01/05/2023    Left foot pain 05/10/2022    Achilles tendinitis, left leg 09/12/2018    Hand eczema 06/02/2018    Aftercare following surgery of the musculoskeletal system 02/27/2018    Left carpal tunnel syndrome 01/26/2018    Intrinsic eczema 01/03/2018    Dyslipidemia 10/31/2017    Excessive bleeding in premenopausal period 04/13/2017    Post-operative state 04/13/2017    Cubital tunnel syndrome on left 01/15/2016    S/P appendectomy 03/09/2015    Shoulder pain 02/12/2015    Type II or unspecified type diabetes mellitus without mention of complication, uncontrolled 02/07/2014    Other and unspecified hyperlipidemia 02/07/2014    PCOS (polycystic ovarian syndrome) 08/09/2011       Past Medical History:    Allergic rhinitis    Asthma    allergy induced    Calculus of kidney    Carpal tunnel syndrome    Cataract    Diabetes (HCC)    diabetes for 2011    Eczema    2-3 years ago, only on right fingers    Essential hypertension    Normal BP readings at home    GDM (gestational diabetes  mellitus) (HCC)    High blood pressure    High cholesterol    HORMONE DISORDER    PCOS    Infertility female    Obesity    Other and unspecified hyperlipidemia    PCOS (polycystic ovarian syndrome)    Seasonal allergies    Shoulder tendonitis    Type II or unspecified type diabetes mellitus without mention of complication, uncontrolled    Visual impairment    glasses       Past Surgical History:   Procedure Laterality Date    Adenoidectomy  1980      ,    Repeat low transverse caesarean section 12    Carpal tunnel release Right     Carpal tunnel release Left 2018    Left carpal tunnel release or neuroplasty of the median nerve at the wrist.    Cataract      Cataract extraction w/  intraocular lens implant Right 2023    Dr. Ny @ [facility]    Colonoscopy N/A 02/15/2022    Procedure: COLONOSCOPY;  Surgeon: Bobby Ann MD;  Location: [facility]    Cystoscopy,insert ureteral stent  2024    Cystoscopy, right ureteroscopy, right retrograde pyelogram, ureteral dilation, placement stent    D & c  2017    Dilatation and curettage, hysteroscopy    Laparoscopic appendectomy  2015    Mirena, iud  2024    Other surgical history      IVF    Reduction of large breast      Tonsillectomy         Medications Prior to Admission   Medication Sig Dispense Refill Last Dose    OZEMPIC, 1 MG/DOSE, 4 MG/3ML Subcutaneous Solution Pen-injector INJECT 1 MG INTO THE SKIN ONE TIME PER WEEK 9 mL 1 24    [] ondansetron 4 MG Oral Tablet Dispersible Take 1 tablet (4 mg total) by mouth every 4 to 6 hours as needed for Nausea. 10 tablet 0     HYDROcodone-acetaminophen  MG Oral Tab Take 1 tablet by mouth every 6 (six) hours as needed for Pain. Watch drowsiness no alcohol 30 tablet 0     acetaminophen 500 MG Oral Tab Take 1 tablet (500 mg total) by mouth every 4 (four) hours as needed for Fever (equal to or greater than 100.4).       [] tamsulosin 0.4  MG Oral Cap Take 1 capsule (0.4 mg total) by mouth daily. Take 1/2 hour following the same meal each day (Patient taking differently: Take 1 capsule (0.4 mg total) by mouth every morning. Take 1/2 hour following the same meal each day) 30 capsule 0     Ciclopirox 8 % External Solution Apply 1 mL topically nightly. 6.6 mL 1     insulin regular human, conc, (HUMULIN R U-500 KWIKPEN) 500 UNIT/ML Subcutaneous Solution Pen-injector Inject 0.24 mL (120 Units total) into the skin daily with breakfast AND 0.24 mL (120 Units total) daily with lunch AND 0.25 mL (125 Units total) daily with dinner. 24 mL 2     atorvastatin 40 MG Oral Tab TAKE 1 TABLET BY MOUTH EVERY DAY AT NIGHT (Patient taking differently: Take 1 tablet (40 mg total) by mouth daily. TAKE 1 TABLET BY MOUTH EVERY DAY AT NIGHT) 90 tablet 3     traZODone 50 MG Oral Tab Take 1 tablet (50 mg total) by mouth nightly as needed for Sleep. 90 tablet 2     fluocinonide 0.05 % External Cream Use twice daily on affected areas, itchy bumps on hands 60 g 3     meclizine 25 MG Oral Tab Take 1 tablet (25 mg total) by mouth 3 (three) times daily as needed for Dizziness. 30 tablet 0     [] HYDROcodone-acetaminophen 5-325 MG Oral Tab Take 1 tablet by mouth every 8 (eight) hours as needed for Pain. 20 tablet 0     Continuous Glucose Sensor (DEXCOM G7 SENSOR) Does not apply Misc 1 each Every 10 days. 9 each 1     lisinopril 5 MG Oral Tab Take 1 tablet (5 mg total) by mouth daily. 90 tablet 3     levonorgestrel 20 MCG/24HR Intrauterine IUD Mirena 20 mcg/24 hr (5 years) intrauterine device   Take by intrauterine route.        Current Facility-Administered Medications Ordered in Epic   Medication Dose Route Frequency Provider Last Rate Last Admin    acetaminophen (Tylenol Extra Strength) tab 1,000 mg  1,000 mg Oral Once Raquel Higgins MD        famotidine (Pepcid) tab 20 mg  20 mg Oral Once Raquel Higgins MD        Or    famotidine (Pepcid) 20 mg/2mL injection 20 mg   20 mg Intravenous Once Raquel Higgins MD        metoclopramide (Reglan) tab 10 mg  10 mg Oral Once Raquel Higgins MD        Or    metoclopramide (Reglan) 5 mg/mL injection 10 mg  10 mg Intravenous Once Raquel Higgins MD        sodium chloride 0.9% infusion   Intravenous Continuous Raquel Higgins MD        ampicillin (Omnipen) 2 g in sodium chloride 0.9% 100 mL IVPB-MBP  2 g Intravenous Once Raquel Higgins MD        cefTRIAXone (Rocephin) 2 g in sodium chloride 0.9% 100 mL IVPB-MBP  2 g Intravenous Once Raquel Higgins MD         No current Epic-ordered outpatient medications on file.       Allergies   Allergen Reactions    Cat Dander [Dander] OTHER (SEE COMMENTS)     Itchy eyes    Iodine (Topical) RASH     Skin rash    Nickel RASH    Seasonal OTHER (SEE COMMENTS)     Itchy eyes       Family History   Problem Relation Age of Onset    Hypertension Father     Obesity Father     Cancer Paternal Grandfather         Esophageal cancer    Diabetes Paternal Grandmother     Stroke Paternal Grandmother     Cancer Son         Medulloblastoma    Glaucoma Neg     Macular degeneration Neg      Social History     Socioeconomic History    Marital status:     Number of children: 1   Occupational History    Occupation: speech therapist   Tobacco Use    Smoking status: Never    Smokeless tobacco: Never   Vaping Use    Vaping status: Never Used   Substance and Sexual Activity    Alcohol use: Never    Drug use: Never    Sexual activity: Yes   Other Topics Concern    Caffeine Concern Yes     Comment: soda-1 cup/day    Reaction to local anesthetic No    Pt has a pacemaker No    Pt has a defibrillator No       Available pre-op labs reviewed.  Lab Results   Component Value Date    WBC 9.2 08/01/2024    RBC 4.30 08/01/2024    HGB 12.7 08/01/2024    HCT 38.0 08/01/2024    MCV 88.4 08/01/2024    MCH 29.5 08/01/2024    MCHC 33.4 08/01/2024    RDW 13.2 08/01/2024    .0 08/01/2024    URINEPREG Negative  07/30/2024     Lab Results   Component Value Date     08/01/2024    K 3.8 08/01/2024     08/01/2024    CO2 24.0 08/01/2024    BUN 11 08/01/2024    CREATSERUM 0.57 08/01/2024     (H) 08/01/2024    PGLU 163 (H) 08/01/2024    CA 7.8 (L) 08/01/2024          Vital Signs:  Body mass index is 43.4 kg/m².   height is 1.6 m (5' 3\") and weight is 111.1 kg (245 lb).   Vitals:    08/07/24 1712   Weight: 111.1 kg (245 lb)   Height: 1.6 m (5' 3\")        Anesthesia Evaluation     Patient summary reviewed and Nursing notes reviewed    Airway   Mallampati: III  TM distance: <3 FB  Neck ROM: full  Dental      Pulmonary - normal exam   (+) asthma  Cardiovascular - normal exam  Exercise tolerance: good  (+) hypertension    Neuro/Psych    (+)  neuromuscular disease, anxiety/panic attacks,        GI/Hepatic/Renal    (+) chronic renal disease    Comments: Nephrolithiasis    Endo/Other    (+) diabetes mellitus type 2  Abdominal   (+) obese                 Anesthesia Plan:   ASA:  2  Plan:   General  Airway:  ETT  Post-op Pain Management: IV analgesics  Informed Consent Plan and Risks Discussed With:  Patient      I have informed Kay Tracy and/or legal guardian or family member of the nature of the anesthetic plan, benefits, risks including possible dental damage if relevant, major complications, and any alternative forms of anesthetic management.   All of the patient's questions were answered to the best of my ability. The patient desires the anesthetic management as planned.  Ge Dunbar MD  8/20/2024 7:50 AM  Present on Admission:  **None**

## 2024-08-20 NOTE — PROGRESS NOTES
[facility] Dosing Service  Antibiotic Dosing    Kay Tracy is a 49 year old for whom pharmacy is dosing ceftriaxone and ampicillin for treatment of  surgical prophylaxis.     Allergies: is allergic to cat dander [dander], iodine (topical), nickel, and seasonal.    Vitals: /83 (BP Location: Right arm)   Pulse 96   Temp 98.4 °F (36.9 °C) (Oral)   Resp 16   Ht 1.6 m (5' 3\")   Wt 109.3 kg (241 lb)   SpO2 97%   BMI 42.69 kg/m²     Labs:   WBC   Date Value Ref Range Status   08/01/2024 9.2 4.0 - 11.0 x10(3) uL Final     Creatinine   Date Value Ref Range Status   08/01/2024 0.57 0.55 - 1.02 mg/dL Final     BUN   Date Value Ref Range Status   08/01/2024 11 9 - 23 mg/dL Final       CrCl: CrCl cannot be calculated (Patient's most recent lab result is older than the maximum 7 days allowed.).    Cultures: No results found for this visit on 08/20/24.    Radiology: N/A    Assessment/Plan: start IV ceftriaxone 2g and IV ampicillin 2g x1 dose for surg ppx    Pharmacy will continue to follow.  We appreciate the opportunity to assist in the care of this patient.    Thank you,   Wilber Roth, PharmD  8/20/2024  7:55 AM

## 2024-08-20 NOTE — OPERATIVE REPORT
OPERATIVE REPORT  DATE OF SURGERY: 8/20/2024  PREOPERATIVE DIAGNOSIS: right ureteral stone  POSTOPERATIVE DIAGNOSIS: same  PROCEDURE: Cystoscopy, right Ureteroscopy, laser lithotripsy, stone basketing, right retrograde pyelogram, placement of 6 x 26cm double j stent on a string  SURGEON: Raquel Higgins MD  ASSISTANT: none  ANESTHESIA: general  FLUIDS: per anesthesia  URINE OUTPUT: n/a  ESTIMATED BLOOD LOSS: 3cc  SPECIMENS: right ureteral stone  CONDITION: Stable to PACU    INDICATIONS: right ureteral stone  PROCEDURE: The patient was met in the preoperative holding area. Appropriate informed consent was obtained and the patient was brought to the operative suite. ?Appropriate timeout was performed per hospital protocol. After the successful induction of general anesthesia, the patient was placed in the dorsal lithotomy position. ?Pressure points were meticulously padded. The patient was prepped and draped in a standard sterile fashion and IV ancef was given as preoperative prophylaxis. At this point, we passed a 22-[place] cystoscope per urethra into the bladder. ?We identified the right ureteric orifice and used fluoroscopic guidance to cannulate this with a .038 Sensor guidewire to the level of the kidney. The prior stent was removed and a wire was passed into the kidney through the stent.?We kept one wire as a safety wire and an over our working wire, we passed a 11/13-[place]an ureteral access sheath keeping one wire behind as a safety wire. Larger access sheaths would not pass given some restriction at the mid-distal ureter. We did use a semirigid scope to evaluate. No stone or stricture was noted. We then passed a ureteroscope through the access sheath up into the kidney. The stone was seen  and was medium in size. We brought in the 200 nanometer Track tip laser fiber on variable settings we ablated into smaller pieces. The tipless nitinol basket was used to completely extract all of the significant fragments  and then we confirmed this with ureteroscopy again. ?We brought the laser back in and dusted a few more tiny fragments with the laser, but then there was nothing significant remaining. ?At this point, the laser fiber was removed and the basket was removed. ?The access sheath was removed while scoping the entire ureter without evidence of any trauma or stone. ?Using the safety wire and fluoroscopic guidance, we passed a 6-[place] x 26 cm stent with a nice coil in the right kidney and a nice coil in the bladder, the string was left. We secured the string to the mons with Mastisol, steri strips and Tegaderm. ?The procedure was then complete. Patient was awoken from anesthesia and taken to the recovery room in stable condition. All counts were correct x2.    IMPRESSION: s/p right urs - all stones treated. Stent on string    PLAN:  Stent x 7 days  Tamsulosin, toradol 3d abx  RBUS 6-8 weeks

## 2024-08-20 NOTE — ANESTHESIA PROCEDURE NOTES
Airway  Date/Time: 8/20/2024 8:45 AM  Urgency: elective    Airway not difficult    General Information and Staff    Patient location during procedure: OR  Anesthesiologist: Ge Dunbar MD  Performed: anesthesiologist   Performed by: Ge Dunbar MD  Authorized by: Ge Dunbar MD      Indications and Patient Condition  Indications for airway management: anesthesia  Spontaneous Ventilation: absent  Sedation level: deep  Preoxygenated: yes  Patient position: sniffing  MILS not maintained throughout  Mask difficulty assessment: 0 - not attempted    Final Airway Details  Final airway type: endotracheal airway      Successful airway: ETT  Cuffed: yes   Successful intubation technique: direct laryngoscopy  Facilitating devices/methods: rapid sequence intubation  Endotracheal tube insertion site: oral  Blade: Corbin  Blade size: #4  ETT size (mm): 7.0    Cormack-Lehane Classification: grade IIA - partial view of glottis  Placement verified by: capnometry   Cuff volume (mL): 5  Measured from: lips  ETT to lips (cm): 22  Number of attempts at approach: 1  Number of other approaches attempted: 0

## 2024-08-20 NOTE — ANESTHESIA POSTPROCEDURE EVALUATION
Patient: Kay Tracy    Procedure Summary       Date: 08/20/24 Room / Location: [facility] MAIN OR  / [facility] MAIN OR    Anesthesia Start: 0842 Anesthesia Stop: 0950    Procedures:       Cystoscopy right ureteroscopy, laser lithotripsy, right retrograde pyelogram, right stent exchange (Right: Urethra)      Cystoscopy retrograde (Right: Urethra)      Cystoscopy with holmium laser (Right: Urethra)      Cystoscopy stent insertion (Right: Urethra) Diagnosis:       Nephrolithiasis      (Nephrolithiasis [N20.0])    Surgeons: Raquel Higgins MD Anesthesiologist: Ge Dunbar MD    Anesthesia Type: general ASA Status: 2            Anesthesia Type: general    Vitals Value Taken Time   /92 08/20/24 0950   Temp 97.2 °F (36.2 °C) 08/20/24 0938   Pulse 108 08/20/24 0951   Resp 16 08/20/24 0951   SpO2 97 % 08/20/24 0951   Vitals shown include unfiled device data.    [facility] AN Post Evaluation:   Patient Evaluated in PACU  Patient Participation: complete - patient participated  Level of Consciousness: awake and alert  Pain Score: 0  Pain Management: adequate  Airway Patency:patent  Yes    Nausea/Vomiting: none  Cardiovascular Status: acceptable  Respiratory Status: acceptable  Postoperative Hydration acceptable      Ge Dunbar MD  8/20/2024 9:51 AM

## 2024-08-21 NOTE — TELEPHONE ENCOUNTER
Spoke with patient, helped her schedule a follow up with Verena. Patient already scheduled Ultrasound.

## 2024-08-22 ENCOUNTER — TELEPHONE (OUTPATIENT)
Dept: SURGERY | Facility: CLINIC | Age: 49
End: 2024-08-22

## 2024-08-29 LAB
CAOX DIHYDRATE: 30 %
CAOX MONOHYDRATE: 70 %
WEIGHT-STONE: 55 MG

## 2024-08-30 DIAGNOSIS — N20.0 RIGHT KIDNEY STONE: ICD-10-CM

## 2024-08-30 NOTE — TELEPHONE ENCOUNTER
Pt is requesting refill for the following medication        HYDROcodone-acetaminophen  MG Oral Tab, Take 1 tablet by mouth every 6 (six) hours as needed for Pain. Watch drowsiness no alcohol, Disp: 30 tablet, Rfl: 0

## 2024-09-01 NOTE — TELEPHONE ENCOUNTER
Recent Fills: 07/16/2024, 08/01/2024, 08/13/2024    Last Rx Written: 08/13/2024    Last Office Visit: 08/06/2024

## 2024-09-03 RX ORDER — HYDROCODONE BITARTRATE AND ACETAMINOPHEN 5; 325 MG/1; MG/1
1 TABLET ORAL EVERY 8 HOURS PRN
Qty: 30 TABLET | Refills: 0 | Status: SHIPPED | OUTPATIENT
Start: 2024-09-03

## 2024-09-17 ENCOUNTER — PATIENT MESSAGE (OUTPATIENT)
Dept: FAMILY MEDICINE CLINIC | Facility: CLINIC | Age: 49
End: 2024-09-17

## 2024-09-17 DIAGNOSIS — B35.3 TINEA PEDIS, UNSPECIFIED LATERALITY: ICD-10-CM

## 2024-09-17 DIAGNOSIS — E11.9 TYPE 2 DIABETES MELLITUS WITHOUT COMPLICATION, WITH LONG TERM CURRENT USE OF INSULIN PUMP (HCC): Primary | ICD-10-CM

## 2024-09-17 DIAGNOSIS — Z96.41 TYPE 2 DIABETES MELLITUS WITHOUT COMPLICATION, WITH LONG TERM CURRENT USE OF INSULIN PUMP (HCC): Primary | ICD-10-CM

## 2024-09-18 NOTE — TELEPHONE ENCOUNTER
From: Kay Tracy  To: Jessica Wong  Sent: 9/17/2024 11:25 AM CDT  Subject: Follow up regarding toe nails    I saw someone in the practice back in spring and was given a nail polish-type medication. I have been using it since and have not noticed an improvement in the nails, and also my left big toe nail is hurting. I was wondering if I should follow up with the podiatrist?

## 2024-10-01 RX ORDER — INSULIN HUMAN 500 [IU]/ML
INJECTION, SOLUTION SUBCUTANEOUS
Qty: 24 ML | Refills: 2 | Status: SHIPPED | OUTPATIENT
Start: 2024-10-01

## 2024-10-01 NOTE — TELEPHONE ENCOUNTER
Endocrine refill protocol for rapid acting, regular, intermediate, and mixed insulin:    Protocol Criteria:  PASSED Reason: N/A - SENT MYCHART RTC 4MO    If all below requirements are met, send a 90-day supply with 1 refill per provider protocol.    Verify appointment with Endocrinology completed in the last 6 months or scheduled in the next 3 months.  Verify A1C has been completed within the last 6 months and is below 8.5%    -May substitute prescriptions for Novolog and Humalog unless documented allergy (pens and vials) at the same dose and concentration per insurance preference and provider protocol.   -May substitute prescriptions for Novolin R and Humulin R unless documented allergy (pens and vials) at the same dose and concentration per insurance preference and provider protocol.   -May substitute prescriptions for Novolin N and Humulin N unless documented allergy (pens and vials) at the same dose and concentration per insurance preference and provider protocol.   -May substitute prescriptions for Humulin and Novolin 70/30 insulin unless documented allergy at the same dose and concentration per insurance preference and provider protocol.    Last completed office visit: 7/11/2024 Sorin Persaud APRN   Next scheduled Follow up: no fu RTC 4 months     Last A1C result: 6.3% done 7/11/2024.

## 2024-10-17 ENCOUNTER — HOSPITAL ENCOUNTER (OUTPATIENT)
Dept: ULTRASOUND IMAGING | Facility: HOSPITAL | Age: 49
Discharge: HOME | End: 2024-10-17
Attending: UROLOGY
Payer: COMMERCIAL

## 2024-10-17 DIAGNOSIS — N20.0 RIGHT KIDNEY STONE: ICD-10-CM

## 2024-10-17 PROCEDURE — 76770 US EXAM ABDO BACK WALL COMP: CPT | Performed by: UROLOGY

## 2024-10-20 PROCEDURE — 88304 TISSUE EXAM BY PATHOLOGIST: CPT | Performed by: PODIATRIST

## 2024-10-20 PROCEDURE — 88312 SPECIAL STAINS GROUP 1: CPT | Performed by: PODIATRIST

## 2024-10-23 ENCOUNTER — LAB REQUISITION (OUTPATIENT)
Dept: LAB | Facility: HOSPITAL | Age: 49
End: 2024-10-23
Payer: COMMERCIAL

## 2024-10-23 DIAGNOSIS — L60.3 NAIL DYSTROPHY: ICD-10-CM

## 2024-10-23 DIAGNOSIS — B35.1 TINEA UNGUIUM: ICD-10-CM

## 2024-10-23 PROCEDURE — 87101 SKIN FUNGI CULTURE: CPT | Performed by: PODIATRIST

## 2024-10-23 PROCEDURE — 87206 SMEAR FLUORESCENT/ACID STAI: CPT | Performed by: PODIATRIST

## 2024-10-23 RX ORDER — DAPAGLIFLOZIN 10 MG/1
10 TABLET, FILM COATED ORAL DAILY
Qty: 90 TABLET | Refills: 1 | Status: SHIPPED | OUTPATIENT
Start: 2024-10-23

## 2024-10-23 NOTE — TELEPHONE ENCOUNTER
Endocrine Refill protocol for oral and injectable diabetic medications    Protocol Criteria:  PASSED  Reason: N/A    If all below requirements are met, send a 90-day supply with 1 refill per provider protocol.    Verify appointment with Endocrinology completed in the last 6 months or scheduled in the next 3 months.  Verify A1C has been completed within the last 6 months and is below 8.5%     Last completed office visit: 7/11/2024 Sorin Persaud APRN   Next scheduled Follow up: No future appointments. [facility] sent  Last A1c result: Last A1c value was 6.3% done 7/11/2024.

## 2024-10-24 ENCOUNTER — PATIENT MESSAGE (OUTPATIENT)
Dept: OBGYN CLINIC | Facility: CLINIC | Age: 49
End: 2024-10-24

## 2024-10-24 DIAGNOSIS — Z12.31 ENCOUNTER FOR SCREENING MAMMOGRAM FOR MALIGNANT NEOPLASM OF BREAST: Primary | ICD-10-CM

## 2024-11-20 ENCOUNTER — IMMUNIZATION (OUTPATIENT)
Dept: LAB | Age: 49
End: 2024-11-20
Attending: EMERGENCY MEDICINE
Payer: COMMERCIAL

## 2024-11-20 DIAGNOSIS — Z23 NEED FOR VACCINATION: Primary | ICD-10-CM

## 2024-11-20 PROCEDURE — 90480 ADMN SARSCOV2 VAC 1/ONLY CMP: CPT

## 2024-12-02 ENCOUNTER — PATIENT MESSAGE (OUTPATIENT)
Dept: FAMILY MEDICINE CLINIC | Facility: CLINIC | Age: 49
End: 2024-12-02

## 2024-12-02 DIAGNOSIS — Z12.11 COLON CANCER SCREENING: Primary | ICD-10-CM

## 2024-12-03 ENCOUNTER — TELEPHONE (OUTPATIENT)
Facility: CLINIC | Age: 49
End: 2024-12-03

## 2024-12-03 DIAGNOSIS — Z86.0100 HISTORY OF COLONIC POLYPS: Primary | ICD-10-CM

## 2024-12-06 NOTE — TELEPHONE ENCOUNTER
Colon recall.     Reviewed allergies pharmacy, medications, medical/surgical history on file, and GI symptoms.     Please provide orders if ok to schedule directly.     Last Procedure, Date, MD:  02/15/2022, Colonoscopy Dr Ann  Last Diagnosis:  tubular adenoma  Recalled (mth/yrs): 3 years  Sedation Used Previously: MAC   Last Prep Used (if known):    Quality Of Prep (if known): excellent  Anticoagulants: no  Diuretics: no  Diabetic Medication (Includes Insulin):  Metformin, Humulin, Ozempic Farxiga  Weight loss Medication: no  Iron/Herbal/Multivitamin Supplements: no  Marijuana/Vaping/CBD: no  Height/Weight: 5'3\"/243  BMI:43.41  Hx of Cardiac &/or CVA Issues (MI/Stroke): no  If yes, in the last 12 months?   Devices Pacemaker/Defibrillator/Stent(s):no  Respiratory Issues/Oxygen Use/ELISABETH/COPD: no  CiPAP/BiPAP:   Issues w/ Anesthesia: no    Symptoms (Y/N): no  Symptoms Details:     Special Comments/Notes: last routine physical with Dr Wong was 11/21/2023 and her next  scheduled  physical w/Dr Wong is 02/26/2025    Thank you!        Bobby Ann MD   Physician  Gastroenterology     Operative Report     Signed     Date of Service: 2/15/2022 10:31 AM  Case Time: Procedures: Surgeons:   2/15/2022  9:54 AM COLONOSCOPY    Bobby Ann MD               Signed         [facility]     COLONOSCOPY PROCEDURE REPORT     DATE OF PROCEDURE:  2/15/2022      PCP: Jessica Wong MD     PREOPERATIVE DIAGNOSIS: Screening colonoscopy examination     POSTOPERATIVE DIAGNOSIS:  See impression.     SURGEON:  Bobby Ann M.D.     SEDATION:    MAC anesthesia provided by the Anesthesia Service.  MAC anesthesia requested due to elevated BMI 42.5, anticipated intolerance of colonoscopy examination under safe doses conscious sedation medications     COLONOSCOPY PROCEDURE:   After the nature and risks of colonoscopy examination under MAC anesthesia were discussed with the patient and all  questions answered, informed consent was obtained.  The patient was sedated as above.       Digital rectal exam was performed which showed no masses.  The Olympus pediatric video colonoscope was placed in the patient's rectum and advanced under direct visualization through the entire length of the colon up to the cecum and terminal ileum.  Retroflex exam performed up the ascending colon to the hepatic flexure.  The cecum was confirmed by landmarks including appendiceal orifice, cecal trifold, ileocecal valve.  Retroflexion was performed in the rectum.     The quality of the prep was excellent.     Estimated blood loss: < 20mL         COLONOSCOPY FINDINGS:    4 sessile colon polyps at the lip of the cecum and in the cecum, 4-6 mm in size, photographed; all removed by cold snare polypectomy and suctioned out.  3 significant colon polyps in the mid transverse colon at approximately 55 cm from the anal verge, scope reduced: One sessile 8 mm polyp removed by cold snare polypectomy; two 8+ mm pedunculated polyps, fairly long stalks removed by snare cautery polypectomy and each retrieved intact.  One 6 mm polyp removed from mid sigmoid colon by cold snare polypectomy, suctioned out.  Two 4 mm polyps removed from the rectal vault by cold snare polypectomy, suctioned out.  Small internal hemorrhoids.     RECOMMENDATIONS:  High fiber diet.  Follow-up above colon polyp pathology results.  Repeat colonoscopy examination in ?3 years or as per polyp pathology results.  No aspirin or NSAID medications for next 10 days to prevent bleeding            =========================  Final Diagnosis:      A. Cecum polyps x4:  Fragments of tubular adenoma.     B. Mid transverse polyps at 55 cm x3:  Fragments of tubular adenoma.  Inked resection margins of larger polyps are negative for adenomatous changes.     C. Rectum polyps x2:  Hyperplastic polyps.     D. Sigmoid colon polyp x1:  Tubular adenoma.

## 2024-12-12 ENCOUNTER — HOSPITAL ENCOUNTER (OUTPATIENT)
Dept: MAMMOGRAPHY | Facility: HOSPITAL | Age: 49
Discharge: HOME | End: 2024-12-12
Attending: OBSTETRICS & GYNECOLOGY
Payer: COMMERCIAL

## 2024-12-12 DIAGNOSIS — Z12.31 ENCOUNTER FOR SCREENING MAMMOGRAM FOR MALIGNANT NEOPLASM OF BREAST: ICD-10-CM

## 2024-12-12 PROCEDURE — 77067 SCR MAMMO BI INCL CAD: CPT | Performed by: OBSTETRICS & GYNECOLOGY

## 2024-12-12 PROCEDURE — 77063 BREAST TOMOSYNTHESIS BI: CPT | Performed by: OBSTETRICS & GYNECOLOGY

## 2024-12-12 RX ORDER — POLYETHYLENE GLYCOL 3350, SODIUM SULFATE ANHYDROUS, SODIUM BICARBONATE, SODIUM CHLORIDE, POTASSIUM CHLORIDE 236; 22.74; 6.74; 5.86; 2.97 G/4L; G/4L; G/4L; G/4L; G/4L
4 POWDER, FOR SOLUTION ORAL ONCE
Qty: 1 EACH | Refills: 0 | Status: SHIPPED | OUTPATIENT
Start: 2024-12-12 | End: 2024-12-12

## 2024-12-12 NOTE — TELEPHONE ENCOUNTER
Thanks all.    Routine office visit Dr. Jessica Wong 8/6/2024 reviewed.  She had been hospitalized for kidney stones, underwent cystoscopy.  Routine physical exam visit with PCP Dr. Jessica Wong 11/21/2023    My previous colonoscopy examination 2/15/2022 reviewed; four sessile adenomatous polyps removed from the cecum, 5 more adenoma and hyperplastic polyps found on the way out.    GI schedulers -    Please schedule colonoscopy exam at [facility] ([facility])    BMI Readings from Last 1 Encounters:   08/20/24 42.69 kg/m²     MAC anesthesia     BP Readings from Last 5 Encounters:   08/20/24 134/80   08/06/24 138/83   08/01/24 127/69   07/30/24 133/83   07/16/24 114/73       Suprep small volume bowel prep if covered by insurance, otherwise   Golytely (PEG) 4L bowel prep  Rx sent in to [facility]      DX = history adenomatous colon polyps    Medication instructions:    Hold metformin day of procedure    May be on insulin pump    Pt or office to call Sorin HENNING for pre procedure instuctions on HUMULIN R (daily dose is 120U three times daily)    Hold the following diabetic/weight loss medications for > 7 days prior to procedure:    GLP1:  Semaglutide (Ozempic®, Wegovy®, Rybelsus®)    Hold the following diabetic/weight loss medications for > 5 days prior to procedure:    SGL2 (5 days):  Dapagliflozin (Farxiga® , Xigduo®)

## 2024-12-13 NOTE — TELEPHONE ENCOUNTER
Scheduled for:  Colonoscopy 23532  Provider Name:     Date:  Friday, 06/13/2025  Location:  [facility]  Sedation:  MAC  Time:  225pm (pt is aware Endo will call with arrival time     Prep:  Suprep   Meds/Allergies Reconciled?:  Yes    Diagnosis with codes:  history adenomatous colon polyps Z86.010  Was patient informed to call insurance with codes (Y/N):  Yes     Referral sent?:  Referral was sent at the time of electronic surgical scheduling.    EM or [facility] notified?:  I sent an electronic request to Endo Scheduling and received a confirmation today.       Medication Orders:  Hold metformin day of procedure     Per patient she is not on a pump May be on insulin pump     Pt or office to call Sorin HENNING for pre procedure instuctions on HUMULIN R (daily dose is 120U three times daily)     Hold the following diabetic/weight loss medications for > 7 days prior to procedure:     GLP1:  Semaglutide (Ozempic®, Wegovy®, Rybelsus®)     Hold the following diabetic/weight loss medications for > 5 days prior to procedure:     SGL2 (5 days):  Dapagliflozin (Farxiga® , Xigduo®)  Misc Orders:  None     Further instructions given by staff:  I discussed the prep instructions with the patient which she verbally understood and is aware that I will send the instructions today.via [employer]

## 2025-01-22 RX ORDER — TRAZODONE HYDROCHLORIDE 50 MG/1
50 TABLET, FILM COATED ORAL NIGHTLY PRN
Qty: 90 TABLET | Refills: 2 | Status: SHIPPED | OUTPATIENT
Start: 2025-01-22

## 2025-01-22 NOTE — TELEPHONE ENCOUNTER
Please Review. Protocol Failed; No Protocol     Requested Prescriptions   Pending Prescriptions Disp Refills    TRAZODONE 50 MG Oral Tab [Pharmacy Med Name: TRAZODONE 50 MG TABLET] 90 tablet 2     Sig: TAKE 1 TABLET BY MOUTH NIGHTLY AS NEEDED FOR SLEEP       There is no refill protocol information for this order            Future Appointments         Provider Department Appt Notes    In 1 month Jessica Wong MD [facility] [place] Annual physical - need to ask about colonoscopy  last px 2023    In 2 months Corinna Benítez MD [facility] - OB/GYN Annual Exam, last px 1/10/24    In 4 months ROXY, PROCEDURE [facility] Colon w/Mac [facility]          Recent Outpatient Visits              5 months ago Kidney stone    [facility] [place] Jessica Wong MD    Office Visit    6 months ago Nephrolithiasis    [facility] Raquel Higgins MD    Office Visit    6 months ago Type 2 diabetes mellitus with mild nonproliferative retinopathy without macular edema, with long-term current use of insulin, unspecified laterality (HCC)    [facility] Sorin Persaud APRN    Office Visit    6 months ago Pain in pelvis    [facility] [place] Nguyen, Minhxuyen, PA-C    Office Visit    7 months ago Acute URI    [facility] Ced Owens MD    Office Visit

## 2025-02-07 ENCOUNTER — TELEPHONE (OUTPATIENT)
Dept: FAMILY MEDICINE CLINIC | Facility: CLINIC | Age: 50
End: 2025-02-07

## 2025-02-07 DIAGNOSIS — Z96.41 TYPE 2 DIABETES MELLITUS WITHOUT COMPLICATION, WITH LONG TERM CURRENT USE OF INSULIN PUMP (HCC): Primary | ICD-10-CM

## 2025-02-07 DIAGNOSIS — B35.3 TINEA PEDIS, UNSPECIFIED LATERALITY: ICD-10-CM

## 2025-02-07 DIAGNOSIS — E11.9 TYPE 2 DIABETES MELLITUS WITHOUT COMPLICATION, WITH LONG TERM CURRENT USE OF INSULIN PUMP (HCC): Primary | ICD-10-CM

## 2025-02-07 NOTE — TELEPHONE ENCOUNTER
Patient is requesting referral.     Name of specialist and specialty department :Dr.Lauren Steen    Reason for visit with the specialist: follow up on both feet   Address of the specialist office: [address] [place] [address] [address] [facility] [id number]  Appointment date: 2/10/25         CSS informed patient the turnaround time for referral is 5-7 business days.  Patient was informed to check their MyChart account for referral status.

## 2025-02-07 NOTE — TELEPHONE ENCOUNTER
Dr. Wong,     Patient called requesting referral to Dr. Steen.     Pended referral please review diagnosis and sign off if you agree.    Thank you.  Merly Garcia  [facility]

## 2025-02-18 PROBLEM — E10.9 TYPE 1 DIABETES MELLITUS (HCC): Status: RESOLVED | Noted: 2025-02-18 | Resolved: 2025-02-18

## 2025-02-18 PROBLEM — E10.9 TYPE 1 DIABETES MELLITUS (HCC): Status: ACTIVE | Noted: 2025-02-18

## 2025-02-26 ENCOUNTER — MED REC SCAN ONLY (OUTPATIENT)
Dept: FAMILY MEDICINE CLINIC | Facility: CLINIC | Age: 50
End: 2025-02-26

## 2025-02-26 ENCOUNTER — OFFICE VISIT (OUTPATIENT)
Dept: FAMILY MEDICINE CLINIC | Facility: CLINIC | Age: 50
End: 2025-02-26
Payer: COMMERCIAL

## 2025-02-26 VITALS
HEIGHT: 63 IN | HEART RATE: 99 BPM | WEIGHT: 250 LBS | SYSTOLIC BLOOD PRESSURE: 122 MMHG | BODY MASS INDEX: 44.3 KG/M2 | DIASTOLIC BLOOD PRESSURE: 77 MMHG

## 2025-02-26 DIAGNOSIS — E55.9 VITAMIN D DEFICIENCY: ICD-10-CM

## 2025-02-26 DIAGNOSIS — Z12.31 SCREENING MAMMOGRAM FOR BREAST CANCER: ICD-10-CM

## 2025-02-26 DIAGNOSIS — E28.2 PCOS (POLYCYSTIC OVARIAN SYNDROME): ICD-10-CM

## 2025-02-26 DIAGNOSIS — E11.9 DIABETES MELLITUS TYPE 2 WITHOUT RETINOPATHY (HCC): Primary | ICD-10-CM

## 2025-02-26 DIAGNOSIS — E78.5 DYSLIPIDEMIA: ICD-10-CM

## 2025-02-26 DIAGNOSIS — E11.9 TYPE 2 DIABETES MELLITUS WITHOUT COMPLICATION, WITH LONG TERM CURRENT USE OF INSULIN PUMP (HCC): ICD-10-CM

## 2025-02-26 DIAGNOSIS — L98.9 SKIN LESION: ICD-10-CM

## 2025-02-26 DIAGNOSIS — Z87.442 HISTORY OF KIDNEY STONES: ICD-10-CM

## 2025-02-26 DIAGNOSIS — Z00.00 ROUTINE MEDICAL EXAM: ICD-10-CM

## 2025-02-26 DIAGNOSIS — Z96.41 TYPE 2 DIABETES MELLITUS WITHOUT COMPLICATION, WITH LONG TERM CURRENT USE OF INSULIN PUMP (HCC): ICD-10-CM

## 2025-02-26 PROBLEM — Z98.890 POST-OPERATIVE STATE: Status: RESOLVED | Noted: 2017-04-13 | Resolved: 2025-02-26

## 2025-02-26 PROBLEM — L30.9 HAND ECZEMA: Status: RESOLVED | Noted: 2018-06-02 | Resolved: 2025-02-26

## 2025-02-26 LAB — HEMOGLOBIN A1C: 6.5 % (ref 4.3–5.6)

## 2025-02-26 PROCEDURE — 3078F DIAST BP <80 MM HG: CPT | Performed by: FAMILY MEDICINE

## 2025-02-26 PROCEDURE — 83036 HEMOGLOBIN GLYCOSYLATED A1C: CPT | Performed by: FAMILY MEDICINE

## 2025-02-26 PROCEDURE — 90471 IMMUNIZATION ADMIN: CPT | Performed by: FAMILY MEDICINE

## 2025-02-26 PROCEDURE — 3008F BODY MASS INDEX DOCD: CPT | Performed by: FAMILY MEDICINE

## 2025-02-26 PROCEDURE — 90677 PCV20 VACCINE IM: CPT | Performed by: FAMILY MEDICINE

## 2025-02-26 PROCEDURE — 90715 TDAP VACCINE 7 YRS/> IM: CPT | Performed by: FAMILY MEDICINE

## 2025-02-26 PROCEDURE — 99396 PREV VISIT EST AGE 40-64: CPT | Performed by: FAMILY MEDICINE

## 2025-02-26 PROCEDURE — 90472 IMMUNIZATION ADMIN EACH ADD: CPT | Performed by: FAMILY MEDICINE

## 2025-02-26 PROCEDURE — 3044F HG A1C LEVEL LT 7.0%: CPT | Performed by: FAMILY MEDICINE

## 2025-02-26 PROCEDURE — 3074F SYST BP LT 130 MM HG: CPT | Performed by: FAMILY MEDICINE

## 2025-02-26 RX ORDER — MECLIZINE HYDROCHLORIDE 25 MG/1
25 TABLET ORAL 3 TIMES DAILY PRN
Qty: 30 TABLET | Refills: 0 | Status: SHIPPED | OUTPATIENT
Start: 2025-02-26

## 2025-02-26 RX ORDER — ATORVASTATIN CALCIUM 40 MG/1
40 TABLET, FILM COATED ORAL DAILY
Qty: 90 TABLET | Refills: 4 | Status: SHIPPED | OUTPATIENT
Start: 2025-02-26

## 2025-02-26 RX ORDER — LISINOPRIL 5 MG/1
5 TABLET ORAL DAILY
Qty: 90 TABLET | Refills: 3 | Status: SHIPPED | OUTPATIENT
Start: 2025-02-26

## 2025-02-26 NOTE — PROGRESS NOTES
HPI:   Kay Tracy is a 49 year old female who presents for a complete physical exam.    Has colonoscopy scheduled for June. Glucose has been ok. Had flu shot.     Wt Readings from Last 3 Encounters:   02/26/25 250 lb (113.4 kg)   08/20/24 241 lb (109.3 kg)   08/06/24 247 lb 3.2 oz (112.1 kg)     Body mass index is 44.29 kg/m².       Current Outpatient Medications   Medication Sig Dispense Refill    traZODone 50 MG Oral Tab Take 1 tablet (50 mg total) by mouth nightly as needed for Sleep. 90 tablet 2    FARXIGA 10 MG Oral Tab TAKE 1 TABLET BY MOUTH EVERY DAY 90 tablet 1    HUMULIN R U-500 KWIKPEN 500 UNIT/ML Subcutaneous Solution Pen-injector INJECT 0.24 ML (120 UNITS TOTAL) INTO THE SKIN DAILY WITH BREAKFAST AND 0.24 ML (120 UNITS TOTAL) DAILY WITH LUNCH AND 0.25 ML (125 UNITS TOTAL) DAILY WITH DINNER. 24 mL 2    OZEMPIC, 1 MG/DOSE, 4 MG/3ML Subcutaneous Solution Pen-injector INJECT 1 MG INTO THE SKIN ONE TIME PER WEEK 9 mL 1    Ciclopirox 8 % External Solution Apply 1 mL topically nightly. 6.6 mL 1    Continuous Glucose Sensor (DEXCOM G7 SENSOR) Does not apply Misc 1 each Every 10 days. 9 each 1    lisinopril 5 MG Oral Tab Take 1 tablet (5 mg total) by mouth daily. 90 tablet 3    atorvastatin 40 MG Oral Tab TAKE 1 TABLET BY MOUTH EVERY DAY AT NIGHT (Patient taking differently: Take 1 tablet (40 mg total) by mouth daily. TAKE 1 TABLET BY MOUTH EVERY DAY AT NIGHT) 90 tablet 3    fluocinonide 0.05 % External Cream Use twice daily on affected areas, itchy bumps on hands 60 g 3    meclizine 25 MG Oral Tab Take 1 tablet (25 mg total) by mouth 3 (three) times daily as needed for Dizziness. 30 tablet 0    levonorgestrel 20 MCG/24HR Intrauterine IUD Mirena 20 mcg/24 hr (5 years) intrauterine device   Take by intrauterine route.      HYDROcodone-acetaminophen 5-325 MG Oral Tab Take 1 tablet by mouth every 8 (eight) hours as needed for Pain. 30 tablet 0    Ketorolac Tromethamine 10 MG Oral Tab Take 1 tablet (10 mg  total) by mouth every 6 (six) hours as needed for Pain. 5 tablet 0    HYDROcodone-acetaminophen  MG Oral Tab Take 1 tablet by mouth every 6 (six) hours as needed for Pain. Watch drowsiness no alcohol 30 tablet 0    acetaminophen 500 MG Oral Tab Take 1 tablet (500 mg total) by mouth every 4 (four) hours as needed for Fever (equal to or greater than 100.4).        Past Medical History:    Allergic rhinitis    Asthma    allergy induced    Calculus of kidney    Carpal tunnel syndrome    Cataract    Diabetes ([facility])    diabetes for     Eczema    2-3 years ago, only on right fingers    Essential hypertension    Normal BP readings at home    GDM (gestational diabetes mellitus) ([facility])    High blood pressure    High cholesterol    HORMONE DISORDER    PCOS    Infertility female    Obesity    Other and unspecified hyperlipidemia    PCOS (polycystic ovarian syndrome)    Seasonal allergies    Shoulder tendonitis    Type 1 diabetes mellitus ([facility])    Type II or unspecified type diabetes mellitus without mention of complication, uncontrolled    Visual impairment    glasses      Past Surgical History:   Procedure Laterality Date    Adenoidectomy      Appendectomy  2015      ,    Repeat low transverse caesarean section 12    Carpal tunnel release Right     Carpal tunnel release Left 2018    Left carpal tunnel release or neuroplasty of the median nerve at the wrist.    Cataract      Cataract extraction w/  intraocular lens implant Right 2023    Dr. Ny @ [facility]    Colonoscopy N/A 02/15/2022    Procedure: COLONOSCOPY;  Surgeon: Bobby Ann MD;  Location: [facility] ENDOSCOPY    Cystoscopy,insert ureteral stent  2024    Cystoscopy, right ureteroscopy, right retrograde pyelogram, ureteral dilation, placement stent    D & c  2017    Dilatation and curettage, hysteroscopy    Laparoscopic appendectomy  2015    Mirena, iud  2024    Other surgical history      IVF     Reduction of large breast  1998    Tonsillectomy  1980      Family History   Problem Relation Age of Onset    Hypertension Father     Obesity Father     Cancer Son         Medulloblastoma    Diabetes Paternal Grandmother     Stroke Paternal Grandmother     Cancer Paternal Grandfather         Esophageal cancer    Glaucoma Neg     Macular degeneration Neg     Breast Cancer Neg     Ovarian Cancer Neg     Prostate Cancer Neg     Pancreatic Cancer Neg       Social History:   Social History     Socioeconomic History    Marital status:     Number of children: 1   Occupational History    Occupation: speech therapist   Tobacco Use    Smoking status: Never    Smokeless tobacco: Never   Vaping Use    Vaping status: Never Used   Substance and Sexual Activity    Alcohol use: Never    Drug use: Never    Sexual activity: Yes   Other Topics Concern    Caffeine Concern Yes     Comment: soda-1 cup/day    Reaction to local anesthetic No    Pt has a pacemaker No    Pt has a defibrillator No     Social Drivers of Health     Food Insecurity: No Food Insecurity (7/31/2024)    Food Insecurity     Food Insecurity: Never true   Transportation Needs: No Transportation Needs (8/2/2024)    Transportation Needs     Lack of Transportation: No   Housing Stability: Low Risk  (7/31/2024)    Housing Stability     Housing Instability: No          REVIEW OF SYSTEMS:   GENERAL: feels well otherwise  Review of Systems   EXAM:   /77   Pulse 99   Ht 5' 3\" (1.6 m)   Wt 250 lb (113.4 kg)   BMI 44.29 kg/m²     GENERAL: well developed, well nourished,in no apparent distress  SKIN: no rashes,no suspicious lesions  HEENT: atraumatic, normocephalic,ears and throat are clear  EYES: EOMI, conjunctiva are clear  LUNGS: clear to auscultation  CARDIO: RRR without murmur  GI: good BS's,no masses, HSM or tenderness  EXTREMITIES: no cyanosis, clubbing or edema  NEURO: Oriented times three,cranial nerves are intact,motor and sensory are grossly  intact  Bilateral barefoot skin diabetic exam is normal, visualized feet and the appearance is normal.  Bilateral monofilament/sensation of both feet is normal.  Pulsation pedal pulse exam of both lower legs/feet is normal as well.     ASSESSMENT AND PLAN:   Kay Tracy is a 49 year old female who presents for a complete physical exam.    1. Diabetes mellitus type 2 without retinopathy (HCC)  Stable   - Microalb/Creat Ratio, Random Urine; Future  - Endocrine Referral - In Network    2. Dyslipidemia  Stable stain     3. PCOS (polycystic ovarian syndrome)    - Endocrine Referral - In Network    4. Type 2 diabetes mellitus without complication, with long term current use of insulin pump (HCC)    - POC Glycohemoglobin [45559]  - Endocrine Referral - In Network    5. Routine medical exam    - Microalb/Creat Ratio, Random Urine; Future  - TSH W Reflex To Free T4; Future  - Vitamin B12; Future  - Vitamin D; Future  - CBC With Differential With Platelet; Future  - Comp Metabolic Panel (14); Future  - Lipid Panel; Future    6. Vitamin D deficiency    - Vitamin D; Future       Jessica Wong MD  2/26/2025  4:43 PM

## 2025-03-06 RX ORDER — SEMAGLUTIDE 1.34 MG/ML
INJECTION, SOLUTION SUBCUTANEOUS
Qty: 9 ML | Refills: 1 | Status: SHIPPED | OUTPATIENT
Start: 2025-03-06

## 2025-03-06 RX ORDER — INSULIN HUMAN 500 [IU]/ML
INJECTION, SOLUTION SUBCUTANEOUS
Qty: 65.7 ML | Refills: 0 | Status: SHIPPED | OUTPATIENT
Start: 2025-03-06 | End: 2025-06-04

## 2025-03-06 NOTE — TELEPHONE ENCOUNTER
Endocrine refill protocol for rapid acting, regular, intermediate, and mixed insulin:    Protocol Criteria:  PASSED Reason: N/A    If all below requirements are met, send a 90-day supply with 1 refill per provider protocol.    Verify appointment with Endocrinology completed in the last 6 months or scheduled in the next 3 months.  Verify A1C has been completed within the last 6 months and is below 8.5%    -May substitute prescriptions for Novolog and Humalog unless documented allergy (pens and vials) at the same dose and concentration per insurance preference and provider protocol.   -May substitute prescriptions for Novolin R and Humulin R unless documented allergy (pens and vials) at the same dose and concentration per insurance preference and provider protocol.   -May substitute prescriptions for Novolin N and Humulin N unless documented allergy (pens and vials) at the same dose and concentration per insurance preference and provider protocol.   -May substitute prescriptions for Humulin and Novolin 70/30 insulin unless documented allergy at the same dose and concentration per insurance preference and provider protocol.    Last completed office visit: 7/11/2024 Sorin Persaud APRN   Next scheduled Follow up:   Future Appointments   Date Time Provider Department Center   3/27/2025  9:20 AM Corinna Benítez MD ECC[facility]OBGYN [facility]   4/21/2025  4:15 PM Marlene Zambrano MD [facility]   6/3/2025  4:00 PM Sorin Persaud APRN ECWMOENDO [facility]   6/13/2025  2:25 PM ROXY, PROCEDURE ECCFHGIPROC None      Last A1C result: 6.5% done 2/26/2025.

## 2025-03-06 NOTE — TELEPHONE ENCOUNTER
Endocrine Refill protocol for oral and injectable diabetic medications    Protocol Criteria:  PASSED  Reason: N/A    If all below requirements are met, send a 90-day supply with 1 refill per provider protocol.    Verify appointment with Endocrinology completed in the last 6 months or scheduled in the next 3 months.  Verify A1C has been completed within the last 6 months and is below 8.5%     Last completed office visit: 7/11/2024 Sorin Persaud APRN   Next scheduled Follow up:   Future Appointments   Date Time Provider Department Center   3/27/2025  9:20 AM Corinna Benítez MD ECCFHMJ [facility]   4/21/2025  4:15 PM [facility]   6/3/2025  4:00 PM Sorin Persaud APRN ECWMOENDFABI [facility]   6/13/2025  2:25 PM ROXY, PROCEDURE ECCFHGIPROC None      Last A1c result: Last A1c value was 6.5% done 2/26/2025.

## 2025-03-27 ENCOUNTER — OFFICE VISIT (OUTPATIENT)
Dept: OBGYN CLINIC | Facility: CLINIC | Age: 50
End: 2025-03-27

## 2025-03-27 VITALS
BODY MASS INDEX: 44 KG/M2 | WEIGHT: 249 LBS | DIASTOLIC BLOOD PRESSURE: 74 MMHG | SYSTOLIC BLOOD PRESSURE: 128 MMHG | HEART RATE: 94 BPM

## 2025-03-27 DIAGNOSIS — Z01.419 ENCOUNTER FOR GYNECOLOGICAL EXAMINATION WITHOUT ABNORMAL FINDING: Primary | ICD-10-CM

## 2025-03-27 DIAGNOSIS — Z12.4 SCREENING FOR CERVICAL CANCER: ICD-10-CM

## 2025-03-27 DIAGNOSIS — Z12.31 VISIT FOR SCREENING MAMMOGRAM: ICD-10-CM

## 2025-03-27 PROCEDURE — 3078F DIAST BP <80 MM HG: CPT | Performed by: OBSTETRICS & GYNECOLOGY

## 2025-03-27 PROCEDURE — 99396 PREV VISIT EST AGE 40-64: CPT | Performed by: OBSTETRICS & GYNECOLOGY

## 2025-03-27 PROCEDURE — 3074F SYST BP LT 130 MM HG: CPT | Performed by: OBSTETRICS & GYNECOLOGY

## 2025-03-27 NOTE — PROGRESS NOTES
Kay Tracy is a 49 year old female  No LMP recorded. (Menstrual status: IUD - Intrauterine Device).    Chief Complaint   Patient presents with    Annual     Reviewed Preventative/Wellness form with patient.     .       History of Present Illness  Kay Tracy is a 49 year old female who presents for a follow-up visit after a history of kidney stones.    She had a kidney stone last year, which required two procedures for resolution. Initially, lithotripsy was performed, but due to the stone's size and location on the kidney, a second procedure was necessary to manually remove it. She experienced significant illness for about four months, during which extensive diagnostic evaluations, including upper gastrointestinal studies, were conducted as her symptoms were initially misattributed to gastrointestinal issues. No recurrence of kidney stones has occurred since the procedures.         OBSTETRICS HISTORY:  OB History    Para Term  AB Living   2 2 2 0 0 2   SAB IAB Ectopic Multiple Live Births   0 0 0 0 2       GYNE HISTORY:   Pap Date: 21  Pap Result Notes: Neg Pap/HPV // Mammo 24 Tr Neg  Follow Up Recommendation: Annual 1/10/24 CAP      MEDICAL HISTORY:  Past Medical History:    Allergic rhinitis    Asthma    allergy induced    Calculus of kidney    Carpal tunnel syndrome    Cataract    Diabetes (HCC)    diabetes for     Eczema    2-3 years ago, only on right fingers    Essential hypertension    Normal BP readings at home    GDM (gestational diabetes mellitus) (HCC)    High blood pressure    High cholesterol    HORMONE DISORDER    PCOS    Infertility female    Obesity    Other and unspecified hyperlipidemia    PCOS (polycystic ovarian syndrome)    Seasonal allergies    Shoulder tendonitis    Type 1 diabetes mellitus (HCC)    Type II or unspecified type diabetes mellitus without mention of complication, uncontrolled    Visual impairment    glasses     Past  Surgical History:   Procedure Laterality Date    Adenoidectomy  1980    Appendectomy  2015      ,2012    Repeat low transverse caesarean section 12    Carpal tunnel release Right     Carpal tunnel release Left 2018    Left carpal tunnel release or neuroplasty of the median nerve at the wrist.    Cataract      Cataract extraction w/  intraocular lens implant Right 2023    Dr. Ny @ [facility]    Colonoscopy N/A 02/15/2022    Procedure: COLONOSCOPY;  Surgeon: Bobby Ann MD;  Location: [facility] ENDOSCOPY    Cystoscopy,insert ureteral stent  2024    Cystoscopy, right ureteroscopy, right retrograde pyelogram, ureteral dilation, placement stent    D & c  2017    Dilatation and curettage, hysteroscopy    Laparoscopic appendectomy  2015    Mirena, iud  2024    Other surgical history      IVF    Reduction of large breast  1998    Tonsillectomy           SOCIAL HISTORY:  Social History     Socioeconomic History    Marital status:     Number of children: 1   Occupational History    Occupation: speech therapist   Tobacco Use    Smoking status: Never    Smokeless tobacco: Never   Vaping Use    Vaping status: Never Used   Substance and Sexual Activity    Alcohol use: Never    Drug use: Never    Sexual activity: Yes     Partners: Male     Birth control/protection: I.U.D.   Other Topics Concern    Caffeine Concern Yes     Comment: soda-1 cup/day    Reaction to local anesthetic No    Pt has a pacemaker No    Pt has a defibrillator No        FAMILY HISTORY:  Family History   Problem Relation Age of Onset    Hypertension Father     Obesity Father     Cancer Son         Medulloblastoma    Diabetes Paternal Grandmother     Stroke Paternal Grandmother     Cancer Paternal Grandfather         Esophageal cancer    Glaucoma Neg     Macular degeneration Neg     Breast Cancer Neg     Ovarian Cancer Neg     Prostate Cancer Neg     Pancreatic Cancer Neg         MEDICATIONS:  Current Outpatient Medications:   OZEMPIC, 1 MG/DOSE, 4 MG/3ML Subcutaneous Solution Pen-injector, INJECT 1 MG INTO THE SKIN ONE TIME PER WEEK, Disp: 9 mL, Rfl: 1  insulin regular human, conc, (HUMULIN R U-500 KWIKPEN) 500 UNIT/ML Subcutaneous Solution Pen-injector, Inject 0.24 mL (120 Units total) into the skin daily with breakfast AND 0.24 mL (120 Units total) daily with lunch AND 0.25 mL (125 Units total) daily with dinner., Disp: 65.7 mL, Rfl: 0  meclizine 25 MG Oral Tab, Take 1 tablet (25 mg total) by mouth 3 (three) times daily as needed for Dizziness., Disp: 30 tablet, Rfl: 0  lisinopril 5 MG Oral Tab, Take 1 tablet (5 mg total) by mouth daily., Disp: 90 tablet, Rfl: 3  atorvastatin 40 MG Oral Tab, Take 1 tablet (40 mg total) by mouth daily. TAKE 1 TABLET BY MOUTH EVERY DAY AT NIGHT, Disp: 90 tablet, Rfl: 4  traZODone 50 MG Oral Tab, Take 1 tablet (50 mg total) by mouth nightly as needed for Sleep., Disp: 90 tablet, Rfl: 2  FARXIGA 10 MG Oral Tab, TAKE 1 TABLET BY MOUTH EVERY DAY, Disp: 90 tablet, Rfl: 1  Ciclopirox 8 % External Solution, Apply 1 mL topically nightly., Disp: 6.6 mL, Rfl: 1  fluocinonide 0.05 % External Cream, Use twice daily on affected areas, itchy bumps on hands, Disp: 60 g, Rfl: 3  levonorgestrel 20 MCG/24HR Intrauterine IUD, Mirena 20 mcg/24 hr (5 years) intrauterine device  Take by intrauterine route., Disp: , Rfl:   HYDROcodone-acetaminophen 5-325 MG Oral Tab, Take 1 tablet by mouth every 8 (eight) hours as needed for Pain., Disp: 30 tablet, Rfl: 0  Ketorolac Tromethamine 10 MG Oral Tab, Take 1 tablet (10 mg total) by mouth every 6 (six) hours as needed for Pain., Disp: 5 tablet, Rfl: 0  HYDROcodone-acetaminophen  MG Oral Tab, Take 1 tablet by mouth every 6 (six) hours as needed for Pain. Watch drowsiness no alcohol, Disp: 30 tablet, Rfl: 0  acetaminophen 500 MG Oral Tab, Take 1 tablet (500 mg total) by mouth every 4 (four) hours as needed for Fever (equal to  or greater than 100.4)., Disp: , Rfl:   Continuous Glucose Sensor (DEXCOM G7 SENSOR) Does not apply Misc, 1 each Every 10 days. (Patient not taking: Reported on 3/27/2025), Disp: 9 each, Rfl: 1    ALLERGIES:  Allergies[1]    Blood pressure 128/74, pulse 94, weight 249 lb (112.9 kg), not currently breastfeeding.    Review of Systems:  Constitutional:  Denies fatigue, night sweats, hot flashes  Eyes:  denies blurred or double vision  Cardiovascular:  denies chest pain or palpitations  Respiratory:  denies shortness of breath  Gastrointestinal:  denies heartburn, abdominal pain, diarrhea or constipation  Genitourinary:  denies dysuria, incontinence, abnormal vaginal discharge, vaginal itching  Musculoskeletal:  denies back pain.  Skin/Breast:  Denies any breast pain, lumps, or discharge.   Neurological:  denies headaches, extremity weakness or numbness.  Psychiatric: denies depression or anxiety.  Endocrine:   denies excessive thirst or urination.  Heme/Lymph:  denies history of anemia, easy bruising or bleeding.    Depression Screening (PHQ-2/PHQ-9): Over the LAST 2 WEEKS   Little interest or pleasure in doing things (over the last two weeks)?: Not at all    Feeling down, depressed, or hopeless (over the last two weeks)?: Not at all    PHQ-2 SCORE: 0           PHYSICAL EXAM:   Constitutional: well developed, well nourished  Head/Face: normocephalic  Neck/Thyroid: thyroid symmetric, no thyromegaly, no nodules, no adenopathy  Lymphatic:no abnormal supraclavicular or axillary adenopathy is noted  Breast: normal without palpable masses, tenderness, asymmetry, nipple discharge, nipple retraction or skin changes  Respiratory:  lungs clear to auscultation bilaterally  Cardiovascular: regular rate and rhythm, no significant murmur  Abdomen:  soft, nontender, nondistended, no masses  Skin/Hair: no unusual rashes or bruises  Extremities: no edema, no cyanosis  Psychiatric:  Oriented to time, place, person and situation.  Appropriate mood and affect    Pelvic Exam:  External Genitalia: mild erythema of labia majora c/w mild inflammation and no lesions  Urethral Meatus:  normal in size, location, without lesions and prolapse  Bladder:  No fullness, masses or tenderness  Vagina:  Normal appearance without lesions, no abnormal discharge  Cervix:  Normal without tenderness on motion  Uterus: normal in size, contour, position, mobility, without tenderness  Adnexa: normal without masses or tenderness  Perineum: normal  Anus: no hemorroids     Results           Assessment & Plan:    Encounter Diagnoses   Name Primary?    Encounter for gynecological examination without abnormal finding Yes    Visit for screening mammogram        Assessment & Plan  Vulvar irritation  Mild irritation likely due to moisture and inflammation.  - Advise application of a thin layer of Vaseline to the vulvar area to protect against moisture. Most likely due to excessive sweating due to body habitus    IUD management  IUD string visible, replaced last year, no issues.  - Plan for removal or replacement as needed in the future.    Routine screening  Routine Pap smear performed, previous mammogram normal.    - Order mammogram for December 12th or later.     .alvin  SBE encouraged  No orders of the defined types were placed in this encounter.    Requested Prescriptions      No prescriptions requested or ordered in this encounter       [facility] KAILA 2D+3D SCREENING BILAT (CPT=77067/80002)                   [1]   Allergies  Allergen Reactions    Cat Dander [Dander] OTHER (SEE COMMENTS)     Itchy eyes    Iodine (Topical) RASH     Skin rash    Nickel RASH    Seasonal OTHER (SEE COMMENTS)     Itchy eyes

## 2025-03-28 ENCOUNTER — TELEPHONE (OUTPATIENT)
Dept: OBGYN CLINIC | Facility: CLINIC | Age: 50
End: 2025-03-28

## 2025-03-28 LAB — HPV E6+E7 MRNA CVX QL NAA+PROBE: POSITIVE

## 2025-03-28 NOTE — TELEPHONE ENCOUNTER
Patient saw her human papillomavirus results and would like ore information.  Patient advised human papillomavirus is a sexually transmitted virus that is very common.  There is no way to know when patient contracted it because it can lay dormant for years.  Patient advised we will need to wait for the pap results to see if there are any cell changes.  This will determine follow up.  [employer] message also sent with human papillomavirus information.

## 2025-04-01 LAB
HPV16 DNA CVX QL PROBE+SIG AMP: NEGATIVE
HPV18 DNA CVX QL PROBE+SIG AMP: NEGATIVE

## 2025-04-04 ENCOUNTER — TELEPHONE (OUTPATIENT)
Dept: OBGYN CLINIC | Facility: CLINIC | Age: 50
End: 2025-04-04

## 2025-04-04 NOTE — TELEPHONE ENCOUNTER
Patient notified of Dr. Benítez's recommendations. Patient agreed and verbalized understanding.    Corinna Benítez MD  4/2/2025  7:29 PM CDT       Pap normal, HPV+ but negative for the strains most associated with cervical cancer, needs pap and HPV test in 1 year,call pt

## 2025-05-06 NOTE — TELEPHONE ENCOUNTER
Current Outpatient Prescriptions:  Insulin Regular Human, Conc, (HUMULIN R U-500 KWIKPEN) 500 UNIT/ML Subcutaneous Solution Pen-injector Take 75 units with breakfast and 90 with lunch and dinner.  Disp: 18 mL Rfl: 0     Per pharmacy pt requesting 90 day m [facility] is working on pt's referral at this time. They will update this RN CM once chart is created. \        1253 -Patient has been approved for [facility]. [facility] orders and DC summary faxed to [facility].

## 2025-05-14 ENCOUNTER — OFFICE VISIT (OUTPATIENT)
Dept: DERMATOLOGY CLINIC | Facility: CLINIC | Age: 50
End: 2025-05-14
Payer: COMMERCIAL

## 2025-05-14 DIAGNOSIS — L30.9 DERMATITIS: ICD-10-CM

## 2025-05-14 DIAGNOSIS — D23.9 BLUE NEVUS: ICD-10-CM

## 2025-05-14 DIAGNOSIS — L82.1 SK (SEBORRHEIC KERATOSIS): ICD-10-CM

## 2025-05-14 DIAGNOSIS — D23.9 BENIGN NEOPLASM OF SKIN, UNSPECIFIED LOCATION: ICD-10-CM

## 2025-05-14 DIAGNOSIS — D22.9 MULTIPLE NEVI: ICD-10-CM

## 2025-05-14 DIAGNOSIS — L72.0 EPIDERMAL CYST: Primary | ICD-10-CM

## 2025-05-14 DIAGNOSIS — L30.1 DYSHIDROTIC ECZEMA: ICD-10-CM

## 2025-05-14 PROCEDURE — 99213 OFFICE O/P EST LOW 20 MIN: CPT | Performed by: DERMATOLOGY

## 2025-05-14 RX ORDER — METFORMIN HYDROCHLORIDE 500 MG/1
2000 TABLET, EXTENDED RELEASE ORAL
COMMUNITY
Start: 2025-04-22

## 2025-05-14 NOTE — PROGRESS NOTES
The following individual(s) verbally consented to be recorded using ambient AI listening technology and understand that they can each withdraw their consent to this listening technology at any point by asking the clinician to turn off or pause the recording:    Patient name: Kay Harris Demarcus

## 2025-05-15 ENCOUNTER — PATIENT MESSAGE (OUTPATIENT)
Dept: FAMILY MEDICINE CLINIC | Facility: CLINIC | Age: 50
End: 2025-05-15

## 2025-05-15 DIAGNOSIS — L72.0 EPIDERMAL CYST OF NECK: Primary | ICD-10-CM

## 2025-05-25 NOTE — PROGRESS NOTES
Kay Tracy is a 49 year old female.  HPI:     CC:    Chief Complaint   Patient presents with    Upper Body Exam     LOV 2024. Pt present for upper body skin exam. Denies any personal or family Hx of skin ca.          Allergies:  Cat dander [dander], Iodine (topical), Nickel, and Seasonal    HISTORY:    Past Medical History:    Allergic rhinitis    Asthma    allergy induced    Calculus of kidney    Carpal tunnel syndrome    Cataract    Diabetes (HCC)    diabetes for     Eczema    2-3 years ago, only on right fingers    Essential hypertension    Normal BP readings at home    GDM (gestational diabetes mellitus) (HCC)    High blood pressure    High cholesterol    HORMONE DISORDER    PCOS    Infertility female    Obesity    Other and unspecified hyperlipidemia    PCOS (polycystic ovarian syndrome)    Seasonal allergies    Shoulder tendonitis    Type 1 diabetes mellitus (HCC)    Type II or unspecified type diabetes mellitus without mention of complication, uncontrolled    Visual impairment    glasses      Past Surgical History:   Procedure Laterality Date    Adenoidectomy      Appendectomy  2015      ,    Repeat low transverse caesarean section 12    Carpal tunnel release Right     Carpal tunnel release Left 2018    Left carpal tunnel release or neuroplasty of the median nerve at the wrist.    Cataract      Cataract extraction w/  intraocular lens implant Right 2023    Dr. Ny @ [facility]    Colonoscopy N/A 02/15/2022    Procedure: COLONOSCOPY;  Surgeon: Bobby Ann MD;  Location: [facility] ENDOSCOPY    Cystoscopy,insert ureteral stent  2024    Cystoscopy, right ureteroscopy, right retrograde pyelogram, ureteral dilation, placement stent    D & c  2017    Dilatation and curettage, hysteroscopy    Laparoscopic appendectomy  2015    Mirena, iud  2024    Other surgical history      IVF    Reduction of large breast  1998    Tonsillectomy   1980      Family History   Problem Relation Age of Onset    Hypertension Father     Obesity Father     Cancer Son         Medulloblastoma    Diabetes Paternal Grandmother     Stroke Paternal Grandmother     Cancer Paternal Grandfather         Esophageal cancer    Glaucoma Neg     Macular degeneration Neg     Breast Cancer Neg     Ovarian Cancer Neg     Prostate Cancer Neg     Pancreatic Cancer Neg       Social History     Socioeconomic History    Marital status:     Number of children: 1   Occupational History    Occupation: speech therapist   Tobacco Use    Smoking status: Never    Smokeless tobacco: Never   Vaping Use    Vaping status: Never Used   Substance and Sexual Activity    Alcohol use: Never    Drug use: Never    Sexual activity: Yes     Partners: Male     Birth control/protection: I.U.D.   Other Topics Concern    Caffeine Concern Yes     Comment: soda-1 cup/day    Reaction to local anesthetic No    Pt has a pacemaker No    Pt has a defibrillator No     Social Drivers of Health     Food Insecurity: No Food Insecurity (7/31/2024)    Food Insecurity     Food Insecurity: Never true   Transportation Needs: No Transportation Needs (8/2/2024)    Transportation Needs     Lack of Transportation: No   Housing Stability: Low Risk  (7/31/2024)    Housing Stability     Housing Instability: No        Current Outpatient Medications   Medication Sig Dispense Refill    metFORMIN  MG Oral Tablet 24 Hr Take 4 tablets (2,000 mg total) by mouth daily with breakfast.      OZEMPIC, 1 MG/DOSE, 4 MG/3ML Subcutaneous Solution Pen-injector INJECT 1 MG INTO THE SKIN ONE TIME PER WEEK 9 mL 1    insulin regular human, conc, (HUMULIN R U-500 KWIKPEN) 500 UNIT/ML Subcutaneous Solution Pen-injector Inject 0.24 mL (120 Units total) into the skin daily with breakfast AND 0.24 mL (120 Units total) daily with lunch AND 0.25 mL (125 Units total) daily with dinner. 65.7 mL 0    meclizine 25 MG Oral Tab Take 1 tablet (25 mg total)  by mouth 3 (three) times daily as needed for Dizziness. 30 tablet 0    lisinopril 5 MG Oral Tab Take 1 tablet (5 mg total) by mouth daily. 90 tablet 3    atorvastatin 40 MG Oral Tab Take 1 tablet (40 mg total) by mouth daily. TAKE 1 TABLET BY MOUTH EVERY DAY AT NIGHT 90 tablet 4    traZODone 50 MG Oral Tab Take 1 tablet (50 mg total) by mouth nightly as needed for Sleep. 90 tablet 2    FARXIGA 10 MG Oral Tab TAKE 1 TABLET BY MOUTH EVERY DAY 90 tablet 1    Ciclopirox 8 % External Solution Apply 1 mL topically nightly. 6.6 mL 1    fluocinonide 0.05 % External Cream Use twice daily on affected areas, itchy bumps on hands 60 g 3    levonorgestrel 20 MCG/24HR Intrauterine IUD Mirena 20 mcg/24 hr (5 years) intrauterine device   Take by intrauterine route.      Continuous Glucose Sensor (DEXCOM G7 SENSOR) Does not apply Misc 1 each Every 10 days. (Patient not taking: Reported on 2025) 9 each 1     Allergies:   Allergies   Allergen Reactions    Cat Dander [Dander] OTHER (SEE COMMENTS)     Itchy eyes    Iodine (Topical) RASH     Skin rash    Nickel RASH    Seasonal OTHER (SEE COMMENTS)     Itchy eyes       Past Medical History:    Allergic rhinitis    Asthma    allergy induced    Calculus of kidney    Carpal tunnel syndrome    Cataract    Diabetes (HCC)    diabetes for 2011    Eczema    2-3 years ago, only on right fingers    Essential hypertension    Normal BP readings at home    GDM (gestational diabetes mellitus) (HCC)    High blood pressure    High cholesterol    HORMONE DISORDER    PCOS    Infertility female    Obesity    Other and unspecified hyperlipidemia    PCOS (polycystic ovarian syndrome)    Seasonal allergies    Shoulder tendonitis    Type 1 diabetes mellitus (HCC)    Type II or unspecified type diabetes mellitus without mention of complication, uncontrolled    Visual impairment    glasses     Past Surgical History:   Procedure Laterality Date    Adenoidectomy      Appendectomy  2015       2009,2012    Repeat low transverse caesarean section 4-13-12    Carpal tunnel release Right     Carpal tunnel release Left 01/26/2018    Left carpal tunnel release or neuroplasty of the median nerve at the wrist.    Cataract      Cataract extraction w/  intraocular lens implant Right 07/26/2023    Dr. Ny @ [facility]    Colonoscopy N/A 02/15/2022    Procedure: COLONOSCOPY;  Surgeon: Bobby Ann MD;  Location: [facility] ENDOSCOPY    Cystoscopy,insert ureteral stent  07/30/2024    Cystoscopy, right ureteroscopy, right retrograde pyelogram, ureteral dilation, placement stent    D & c  04/13/2017    Dilatation and curettage, hysteroscopy    Laparoscopic appendectomy  02/27/2015    Mirena, iud  02/12/2024    Other surgical history      IVF    Reduction of large breast  1998    Tonsillectomy  1980     Social History     Socioeconomic History    Marital status:      Spouse name: Not on file    Number of children: 1    Years of education: Not on file    Highest education level: Not on file   Occupational History    Occupation: speech therapist   Tobacco Use    Smoking status: Never    Smokeless tobacco: Never   Vaping Use    Vaping status: Never Used   Substance and Sexual Activity    Alcohol use: Never    Drug use: Never    Sexual activity: Yes     Partners: Male     Birth control/protection: I.U.D.   Other Topics Concern     Service Not Asked    Blood Transfusions Not Asked    Caffeine Concern Yes     Comment: soda-1 cup/day    Occupational Exposure Not Asked    Hobby Hazards Not Asked    Sleep Concern Not Asked    Stress Concern Not Asked    Weight Concern Not Asked    Special Diet Not Asked    Back Care Not Asked    Exercise Not Asked    Bike Helmet Not Asked    Seat Belt Not Asked    Self-Exams Not Asked    Grew up on a farm Not Asked    History of tanning Not Asked    Outdoor occupation Not Asked    Breast feeding Not Asked    Reaction to local anesthetic No    Pt has a pacemaker No    Pt has a  defibrillator No   Social History Narrative    Not on file     Social Drivers of Health     Food Insecurity: No Food Insecurity (7/31/2024)    Food Insecurity     Food Insecurity: Never true   Transportation Needs: No Transportation Needs (8/2/2024)    Transportation Needs     Lack of Transportation: No     Car Seat: Not on file   Stress: Not on file   Housing Stability: Low Risk  (7/31/2024)    Housing Stability     Housing Instability: No     Housing Instability Emergency: Not on file     Crib or [place]: Not on file     Family History   Problem Relation Age of Onset    Hypertension Father     Obesity Father     Cancer Son         Medulloblastoma    Diabetes Paternal Grandmother     Stroke Paternal Grandmother     Cancer Paternal Grandfather         Esophageal cancer    Glaucoma Neg     Macular degeneration Neg     Breast Cancer Neg     Ovarian Cancer Neg     Prostate Cancer Neg     Pancreatic Cancer Neg        There were no vitals filed for this visit.    HPI:  Chief Complaint   Patient presents with    Upper Body Exam     LOV 1/2024. Pt present for upper body skin exam. Denies any personal or family Hx of skin ca.      Fair skin lots of sun in the past.  Outdoors with her children and sports  No personal  or family history of skin cancer    History of Present Illness  Kay Tracy is a 49 year old female who presents with a mole on her cheek and a cyst on the side of her neck.    She has a mole on her cheek that has remained unchanged with no new symptoms or changes in appearance. She is concerned about the mole and seeks evaluation to ensure it is benign.    She also has a cyst on the side of her neck. She inquires about the need for a referral due to her HMO insurance, indicating a potential concern for coverage of the procedure.        Patient presents with concerns above.    Patient has been in their usual state of health.     Past notes/ records and appropriate/relevant lab results including  pathology and past body maps reviewed. Including outside notes/ PCP notes as appropriate. Updated and new information noted in current visit.     ROS:  Denies other relevant systemic complaints.      History, medications, allergies reviewed as noted.       Physical Examination:     Well-developed well-nourished patient alert oriented in no acute distress.  Exam performed, including scalp, head, neck, face,nails, hair, external eyes, including conjunctival mucosa, eyelids, lips external ears , arms, digits,palms.     Multiple light to medium brown, well marginated, uniformly pigmented, macules and papules 6 mm and less scattered on exam. pigmented lesions examined with dermoscopy benign-appearing patterns.     Waxy tannish keratotic papules scattered, cherry-red vascular papules scattered.    See map today's date for lesions noted .  See assessment and plan below for specific lesions.    Otherwise remarkable for lesions as noted on map.    See A/P  below for additional information:    Assessment / plan:    No orders of the defined types were placed in this encounter.      Meds & Refills for this Visit:  Requested Prescriptions      No prescriptions requested or ordered in this encounter         Encounter Diagnoses   Name Primary?    Epidermal cyst Yes    SK (seborrheic keratosis)     Blue nevus     Multiple nevi     Benign neoplasm of skin, unspecified location     Dyshidrotic eczema     Dermatitis          Physical Exam  SKIN: Mole on cheek unchanged. Cyst on side of neck present.    Results        Assessment & Plan  Epidermal cyst  Epidermal cyst on the lateral neck.  Right posterior 5 x 6 mm plan excision  - Schedule cyst excision.  - Use absorbable sutures for closure.  - Advise to keep the area dry for one week post-procedure, avoiding pool water.    History of eczema continue current management  Dyshidrotic changes over the hands      See patient photos  Dermatitis.  Meds in grid.  Skin care instructions  reviewed.  [place] use of emollients.  Pathophysiology reviewed.  Consider Contac allergy in differential.  Consider patch testing.  Patient will let us know how they are doing over the next several weeks.  Await clinical response to above therapies.  Lidex cream twice daily, continue current moisturizers, if worsening will let us know.    More waxy tan keratotic papule at left temple trial of cryo reassurance.    Extensive nevi no atypical features    History of epidermal cyst at left hip stable.  Cyst on upper abdomen stable  Blue nevus left cheek stable, scattered smaller nevi stable reassurance.  Multiple cherry neuromas as noted previously reassurance.  No suspicious nevi.  Continue sun protection regular skin checks.    Fair skin more erythema over the face,No other susupicious lesions on todays  exam.      No other susupicious lesions on todays  exam.      Please refer to map for specific lesions.  See additional diagnoses.  Pros cons of various therapies, risks benefits discussed.Pathophysiology discussed with patient.  Therapeutic options reviewed.  See  Medications in grid.  Instructions reviewed at length.    Benign nevi, seborrheic  keratoses, cherry angiomas:  Reassurance regarding other benign skin lesions.    Monitor for new or changing lesions. Signs and symptoms of skin cancer, ABCDE's of melanoma ( additional information available at AAD.org, skincancer.org) Encourage Sunscreen (broad-spectrum, ideally mineral-based-UVA/UVB -SPF 30 or higher) use encouraged, sun protection/sun protective clothing, self exams reviewed Followup as noted RTC ---routine checkup 6 mos -one year or p.r.n.    Encounter Times   Including precharting, reviewing chart, prior notes obtaining history: 10 minutes, medical exam :10 minutes, notes on body map, plan, counseling 10minutes My total time spent caring for the patient on the day of the encounter: 30 minutes     The patient indicates understanding of these issues and  agrees to the plan.  The patient is asked to return as noted in follow-up/ above.    This note was generated using Dragon voice recognition software.  Please contact me regarding any confusion resulting from errors in recognition..  Note to patient and family: The 21st Century Cures Act makes medical notes like these available to patients. However, be advised this is a medical document. It is intended as ukog-kf-imui communication and monitoring of a patient's care needs. It is written in medical language and may contain abbreviations or verbiage that are unfamiliar. It may appear blunt or direct. Medical documents are intended to carry relevant information, facts as evident and the clinical opinion of the practitioner.

## 2025-05-27 ENCOUNTER — TELEPHONE (OUTPATIENT)
Dept: FAMILY MEDICINE CLINIC | Facility: CLINIC | Age: 50
End: 2025-05-27

## 2025-05-27 DIAGNOSIS — L72.9 CYST OF SKIN: Primary | ICD-10-CM

## 2025-05-29 ENCOUNTER — TELEPHONE (OUTPATIENT)
Dept: CASE MANAGEMENT | Age: 50
End: 2025-05-29

## 2025-05-29 DIAGNOSIS — L72.0 EPIDERMAL CYST: Primary | ICD-10-CM

## 2025-05-29 DIAGNOSIS — D48.5 NEOPLASM OF UNCERTAIN BEHAVIOR OF SKIN: ICD-10-CM

## 2025-05-29 NOTE — TELEPHONE ENCOUNTER
This is a punch excisionin the Derm office please see my last office visit note from 5/14/25 thank you

## 2025-05-29 NOTE — TELEPHONE ENCOUNTER
Dr Zambrano,     We received a request for excision of cyst for this patient.     If you agree with plan of care, please provide all clinicals, procedure codes, and location for where this will be performed.     Thank you  Merly Garcia

## 2025-06-01 NOTE — TELEPHONE ENCOUNTER
Cpt  93138 - anticipate excision neck  benign lesion 6-10mm,   Neoplasm of uncertain behavior  ICD-10-CM: D48.5   Epidermal cyst  ICD-10-CM: L72.0     May require intermediate repair, and will be sent for pathology

## 2025-06-02 ENCOUNTER — TELEPHONE (OUTPATIENT)
Dept: CASE MANAGEMENT | Age: 50
End: 2025-06-02

## 2025-06-02 DIAGNOSIS — D48.5 NEOPLASM OF UNCERTAIN BEHAVIOR OF SKIN: Primary | ICD-10-CM

## 2025-06-02 DIAGNOSIS — L72.0 EPIDERMAL CYST: ICD-10-CM

## 2025-06-02 NOTE — TELEPHONE ENCOUNTER
Dr. Wong,     Patient needs referral for procedure with Dr. Zambrano.      Pended referral please review diagnosis and sign off if you agree.    Thank you.  Merly Garcia  [facility]

## 2025-06-03 ENCOUNTER — OFFICE VISIT (OUTPATIENT)
Dept: ENDOCRINOLOGY CLINIC | Facility: CLINIC | Age: 50
End: 2025-06-03
Payer: COMMERCIAL

## 2025-06-03 VITALS
HEIGHT: 63 IN | WEIGHT: 251 LBS | DIASTOLIC BLOOD PRESSURE: 71 MMHG | SYSTOLIC BLOOD PRESSURE: 102 MMHG | HEART RATE: 98 BPM | BODY MASS INDEX: 44.47 KG/M2 | RESPIRATION RATE: 28 BRPM

## 2025-06-03 DIAGNOSIS — Z79.4 TYPE 2 DIABETES MELLITUS WITH MILD NONPROLIFERATIVE RETINOPATHY WITHOUT MACULAR EDEMA, WITH LONG-TERM CURRENT USE OF INSULIN, UNSPECIFIED LATERALITY (HCC): Primary | ICD-10-CM

## 2025-06-03 DIAGNOSIS — E11.3299 TYPE 2 DIABETES MELLITUS WITH MILD NONPROLIFERATIVE RETINOPATHY WITHOUT MACULAR EDEMA, WITH LONG-TERM CURRENT USE OF INSULIN, UNSPECIFIED LATERALITY (HCC): Primary | ICD-10-CM

## 2025-06-03 LAB
GLUCOSE BLOOD: 277
HEMOGLOBIN A1C: 7.5 % (ref 4.3–5.6)
TEST STRIP LOT #: NORMAL NUMERIC

## 2025-06-03 PROCEDURE — 83036 HEMOGLOBIN GLYCOSYLATED A1C: CPT | Performed by: NURSE PRACTITIONER

## 2025-06-03 PROCEDURE — 99214 OFFICE O/P EST MOD 30 MIN: CPT | Performed by: NURSE PRACTITIONER

## 2025-06-03 PROCEDURE — 3074F SYST BP LT 130 MM HG: CPT | Performed by: NURSE PRACTITIONER

## 2025-06-03 PROCEDURE — 82947 ASSAY GLUCOSE BLOOD QUANT: CPT | Performed by: NURSE PRACTITIONER

## 2025-06-03 PROCEDURE — 3008F BODY MASS INDEX DOCD: CPT | Performed by: NURSE PRACTITIONER

## 2025-06-03 PROCEDURE — 3051F HG A1C>EQUAL 7.0%<8.0%: CPT | Performed by: NURSE PRACTITIONER

## 2025-06-03 PROCEDURE — 3078F DIAST BP <80 MM HG: CPT | Performed by: NURSE PRACTITIONER

## 2025-06-03 RX ORDER — TIRZEPATIDE 10 MG/.5ML
10 INJECTION, SOLUTION SUBCUTANEOUS WEEKLY
Qty: 6 ML | Refills: 0 | Status: SHIPPED | OUTPATIENT
Start: 2025-06-03

## 2025-06-03 NOTE — PATIENT INSTRUCTIONS
A1C: 7.5% today --> previously was 6.3% on 7/11/2024  Blood glucose: 277 in clinic today    Medications:   -continue with Metformin XR  2,000mg daily with breakfast   - continue with U500   120 units Breakfast  120 units with lunch  115 units dinner   - increase Ozempic 1--> 2mg once weekly until home supply is finished    --> then transition to Mounjaro 10mg once weekly   -continue with Farxiga 10mg once daily in AM       - let's work on getting back on track with following a low carb diet consistently   - increase walking to daily - for 30 mins each time   - repeat labs - fast for 10hrs       Weight:  Wt Readings from Last 6 Encounters:   06/03/25 251 lb (113.9 kg)   03/27/25 249 lb (112.9 kg)   02/26/25 250 lb (113.4 kg)   08/20/24 241 lb (109.3 kg)   08/06/24 247 lb 3.2 oz (112.1 kg)   07/31/24 245 lb (111.1 kg)     A1C goal:  <7.0%    Blood sugar testing:  Test your blood sugar 1 time daily   Recommended times to test: alternate with fasting or 2hrs after meals     Blood sugar targets:  Before breakfast:  (preferably < 110)  Before meals: <150   2 hours after meals: <180 (preferably <150)     Call for persistent blood sugars < 75 or > 200

## 2025-06-03 NOTE — PROGRESS NOTES
Name: Kay Tracy  Date: 6/3/2025    CHIEF COMPLAINT   Chief Complaint   Patient presents with    Diabetes     Follow-Up     HISTORY OF PRESENT ILLNESS   Kay Tracy is a 49 year old female who presents for follow up on diabetes management.  HbA1C: 7.5% at POC today. Previously was 6.3% on 2024.   Blood glucose is: 277 at POC today.   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   He has been working on following a low carb diet, however she has not been consistent with this. She has not been able to stay active with exercise as previously was doing. She has been compliant with all diabetes medications.     DIABETES HISTORY  Diagnosed: PCOS at age 21; gestational diabetes at age 33  And was diagnosed with DM shortly after in .   Prior HbA, C or glycohemoglobin were 9.2% 2018; 8.6% 2018; 9.1% 3/2019; 8.4% 2019; 7.2% 2020; 7.3% 10/2020; 6.5% 21; 6.4% `2022; 5.8% 2022; 6.3% 2022; 7.0% 2023; 6.1% 10/17/2023; 6.3% 2024; 7.5% at POC today;     Patient has not had hospitalizations for blood sugar issues and denies any history of pancreatitis    CURRENT MEDICATIONS FOR DM:  -Metformin ER  2,000mg daily with breakfast   -Ozempic 1mg once weekly -tolerating well; denies Gi  -Farxiga 10mg once daily in AM  -U500 insulin: 120 units Breakfast               120 unit with lunch                 115 unit dinner     HOME GLUCOSE READINGS:   Checking BG readings infrequently   Testing 2-3x daily   Fastins-130s   After meals: 160s-170s   Hypoglycemia: no   Hypoglycemia awareness: yes     HISTORY OF DIABETES COMPLICATIONS:  History of Retinopathy: yes  - last eye exam within the past 12 months: yes - last exam was 2025 at [facility]   History of Neuropathy: no   History of Nephropathy: no      ASSOCIATED COMPLICATIONS:   HTN: no   Hyperlipidemia: yes  Coronary Artery Disease:  No   Cerebrovascular Disease: no   Peripheral Vascular Disease: no     DIETARY COMPLIANCE:  Fair; has not been following a low carb diet consistently   - avoids fast foods     EXERCISE:   No     Polyuria, polyphagia, polydipsia: no  Paresthesias: no  Blurred vision: no  Recent steroids, illness or infections: no     REVIEW OF SYSTEMS  Constitutional: Negative for: weight change, fever, fatigue, cold/heat intolerance  Eyes: Negative for:  Visual changes, proptosis, blurring  Respiratory: Negative for: hemoptysis, shortness of breath, cough, or dyspnea.  Cardiovascular: Negative for:  chest pain, chest discomfort, palpitations  GI: Negative for:  abdominal pain, nausea, vomiting, diarrhea, heartburn, constipation  Neurology: Negative for: headache, dizziness, syncope, numbness/tingling, or weakness.   Genito-Urinary: Negative for: dysuria, frequency or hematuria   Hematology/Lymphatics: Negative for: bruising, easy bleeding, lower extremity edema  Endocrine: Negative for: polyuria, polydipsia. No thyroid disease. No osteoporosis.   Skin: Negative for: rash, blister, infection or ulcers.    MEDICATIONS:     Current Outpatient Medications:     metFORMIN  MG Oral Tablet 24 Hr, Take 4 tablets (2,000 mg total) by mouth daily with breakfast., Disp: , Rfl:     OZEMPIC, 1 MG/DOSE, 4 MG/3ML Subcutaneous Solution Pen-injector, INJECT 1 MG INTO THE SKIN ONE TIME PER WEEK, Disp: 9 mL, Rfl: 1    insulin regular human, conc, (HUMULIN R U-500 KWIKPEN) 500 UNIT/ML Subcutaneous Solution Pen-injector, Inject 0.24 mL (120 Units total) into the skin daily with breakfast AND 0.24 mL (120 Units total) daily with lunch AND 0.25 mL (125 Units total) daily with dinner., Disp: 65.7 mL, Rfl: 0    meclizine 25 MG Oral Tab, Take 1 tablet (25 mg total) by mouth 3 (three) times daily as needed for Dizziness., Disp: 30 tablet, Rfl: 0    lisinopril 5 MG  Oral Tab, Take 1 tablet (5 mg total) by mouth daily., Disp: 90 tablet, Rfl: 3    atorvastatin 40 MG Oral Tab, Take 1 tablet (40 mg total) by mouth daily. TAKE 1 TABLET BY MOUTH EVERY DAY AT NIGHT, Disp: 90 tablet, Rfl: 4    traZODone 50 MG Oral Tab, Take 1 tablet (50 mg total) by mouth nightly as needed for Sleep., Disp: 90 tablet, Rfl: 2    FARXIGA 10 MG Oral Tab, TAKE 1 TABLET BY MOUTH EVERY DAY, Disp: 90 tablet, Rfl: 1    Ciclopirox 8 % External Solution, Apply 1 mL topically nightly., Disp: 6.6 mL, Rfl: 1    Continuous Glucose Sensor (DEXCOM G7 SENSOR) Does not apply Misc, 1 each Every 10 days., Disp: 9 each, Rfl: 1    fluocinonide 0.05 % External Cream, Use twice daily on affected areas, itchy bumps on hands, Disp: 60 g, Rfl: 3    levonorgestrel 20 MCG/24HR Intrauterine IUD, Mirena 20 mcg/24 hr (5 years) intrauterine device  Take by intrauterine route., Disp: , Rfl:     ALLERGIES:   Allergies   Allergen Reactions    Cat Dander [Dander] OTHER (SEE COMMENTS)     Itchy eyes    Iodine (Topical) RASH     Skin rash    Nickel RASH    Seasonal OTHER (SEE COMMENTS)     Itchy eyes       SOCIAL HISTORY:   Social History     Socioeconomic History    Marital status:     Number of children: 1   Occupational History    Occupation: speech therapist   Tobacco Use    Smoking status: Never    Smokeless tobacco: Never   Vaping Use    Vaping status: Never Used   Substance and Sexual Activity    Alcohol use: Never    Drug use: Never    Sexual activity: Yes     Partners: Male     Birth control/protection: I.U.D.   Other Topics Concern    Caffeine Concern Yes     Comment: soda-1 cup/day    Reaction to local anesthetic No    Pt has a pacemaker No    Pt has a defibrillator No     PAST MEDICAL HISTORY:   Past Medical History:    Allergic rhinitis    Asthma    allergy induced    Calculus of kidney    Carpal tunnel syndrome    Cataract    Diabetes (HCC)    diabetes for 2011    Eczema    2-3 years ago, only on right fingers     Essential hypertension    Normal BP readings at home    GDM (gestational diabetes mellitus) (HCC)    High blood pressure    High cholesterol    HORMONE DISORDER    PCOS    Infertility female    Obesity    Other and unspecified hyperlipidemia    PCOS (polycystic ovarian syndrome)    Seasonal allergies    Shoulder tendonitis    Type 1 diabetes mellitus (HCC)    Type II or unspecified type diabetes mellitus without mention of complication, uncontrolled    Visual impairment    glasses     PAST SURGICAL HISTORY:   Past Surgical History:   Procedure Laterality Date    Adenoidectomy      Appendectomy  2015      ,    Repeat low transverse caesarean section 12    Carpal tunnel release Right     Carpal tunnel release Left 2018    Left carpal tunnel release or neuroplasty of the median nerve at the wrist.    Cataract      Cataract extraction w/  intraocular lens implant Right 2023    Dr. Ny @ [facility]    Colonoscopy N/A 02/15/2022    Procedure: COLONOSCOPY;  Surgeon: Bobby Ann MD;  Location: [facility] ENDOSCOPY    Cystoscopy,insert ureteral stent  2024    Cystoscopy, right ureteroscopy, right retrograde pyelogram, ureteral dilation, placement stent    D & c  2017    Dilatation and curettage, hysteroscopy    Laparoscopic appendectomy  2015    Mirena, iud  2024    Other surgical history      IVF    Reduction of large breast      Tonsillectomy       PHYSICAL EXAM:   Vitals:    25 1608   BP: 102/71   BP Location: Left arm   Pulse: 98   Resp: (!) 28   Weight: 251 lb (113.9 kg)   Height: 5' 3\" (1.6 m)       BMI:   Body mass index is 44.46 kg/m².    General Appearance:  alert, well developed, in no acute distress  Nutritional:  no extreme weight gain or loss  Head: Atraumatic  Eyes:  normal conjunctivae, sclera., normal sclera and normal pupils  Throat/Neck: normal sound to voice. Normal hearing, normal speech  Back: no kyphosis  Respiratory:   Speaking in full sentences, non-labored. no increased work of breathing, no audible wheezing    Skin:  normal moisture and skin texture, no visible lesions  Hair and nails: normal scalp hair  Hematologic:  no excessive bruising  Neuro: motor grossly intact, moving all extremities without difficulty  Psychiatric:  oriented to time, self, and place  Extremities: no obvious extremity swelling, no lesions    Diabetes foot exam:  Bilateral barefoot skin diabetic exam is normal, visualized feet and the appearance is normal.  Bilateral monofilament/sensation of both feet is normal.  Pulsation pedal pulse exam of both lower legs/feet is normal as well.    LABS: pertinent labs reviewed    ASSESSMENT/PLAN:    -Reviewed with patient the pathogenesis of diabetes, clinical significance of A1c, and common complications such as: microvascular, macrovascular and diabetic ketoacidosis. Patient verbalizes understanding of the importance of glycemic control and the goals of therapy.   -Discussed with patient glucose targets ranges (Fasting  and post prandial <180).   1.Type 2 Diabetes Mellitus, uncontrolled with hypoglycemia with long-term insulin   LAB DATA  HbA,C: 7.5% today   a) Medications  -continue with Metformin XR  2,000mg daily with breakfast   -continue with Farxiga 10mg once daily   - continue with U500   120 units Breakfast  120 units with lunch  115 units dinner   -increase Ozempic 1 --> 2mg once weekly until home supply is finished - Risks and benefits reviewed. Verbalizes understanding.   --> then transition to Mounjaro 10mg once weekly - Risks and benefits reviewed. Verbalizes understanding.      - discussed getting back on track with following a low carb diet consistently   - discussed increasing walking/exercise to 5x weekly for at least 30 mins each time   -reviewed target goal BG readings and A1C  -reviewed when to call clinic with abnormal BG readings  -Discussed importance of hydration. Patient advised to  drink 1-2 glasses additional of water than usually to avoid dehydration while on Farxiga.       b) no nephropathy:  on 2023 and urine MA: 8.3 on 2022 --> repeat labs as ordered by Dr. Wong  c) UTD with optho- patient will work on getting report faxed to our office   d) Foot exam normal today 6/3/2025  e) start testing BG readings 3x daily - before meals   f) Life style changes reviewed     2. Blood Pressure Management   -on lisinopril - verbalizes compliance and denies s/e.   -normotensive     3. Dyslipidemia   -LDL: 64 and Tri on 2023  -c/w atorvastatin 40mg once daily -verbalizes compliance and denies s/e  - repeat labs as ordered by Dr. Wong      RTC in 4 months   Patient instructed to call or [email or web address] sooner if she develops blood sugar readings <70 or has readings persistently >250.     The risks and benefits of my recommendations were discussed with the patient today. Questions were also answered to the best of my knowledge. Patient verbalizes understanding of these issues and agrees to the plan.    6/3/2025  JUVENCIO Moses

## 2025-06-10 ENCOUNTER — TELEPHONE (OUTPATIENT)
Facility: CLINIC | Age: 50
End: 2025-06-10

## 2025-06-13 ENCOUNTER — ANESTHESIA EVENT (OUTPATIENT)
Dept: ENDOSCOPY | Facility: HOSPITAL | Age: 50
End: 2025-06-13
Payer: COMMERCIAL

## 2025-06-13 ENCOUNTER — ANESTHESIA (OUTPATIENT)
Dept: ENDOSCOPY | Facility: HOSPITAL | Age: 50
End: 2025-06-13
Payer: COMMERCIAL

## 2025-06-13 ENCOUNTER — HOSPITAL ENCOUNTER (OUTPATIENT)
Facility: HOSPITAL | Age: 50
Setting detail: HOSPITAL OUTPATIENT SURGERY
Discharge: HOME | End: 2025-06-13
Attending: INTERNAL MEDICINE | Admitting: INTERNAL MEDICINE
Payer: COMMERCIAL

## 2025-06-13 VITALS
WEIGHT: 251 LBS | HEIGHT: 63 IN | DIASTOLIC BLOOD PRESSURE: 77 MMHG | RESPIRATION RATE: 25 BRPM | BODY MASS INDEX: 44.47 KG/M2 | OXYGEN SATURATION: 96 % | HEART RATE: 90 BPM | SYSTOLIC BLOOD PRESSURE: 106 MMHG

## 2025-06-13 DIAGNOSIS — Z86.0100 HISTORY OF COLONIC POLYPS: ICD-10-CM

## 2025-06-13 PROBLEM — K64.8 INTERNAL HEMORRHOIDS: Status: ACTIVE | Noted: 2025-06-13

## 2025-06-13 LAB
B-HCG UR QL: NEGATIVE
GLUCOSE BLDC GLUCOMTR-MCNC: 95 MG/DL (ref 70–99)

## 2025-06-13 PROCEDURE — 45378 DIAGNOSTIC COLONOSCOPY: CPT | Performed by: INTERNAL MEDICINE

## 2025-06-13 RX ORDER — SODIUM CHLORIDE, SODIUM LACTATE, POTASSIUM CHLORIDE, CALCIUM CHLORIDE 600; 310; 30; 20 MG/100ML; MG/100ML; MG/100ML; MG/100ML
INJECTION, SOLUTION INTRAVENOUS CONTINUOUS
OUTPATIENT
Start: 2025-06-13

## 2025-06-13 RX ORDER — SODIUM CHLORIDE, SODIUM LACTATE, POTASSIUM CHLORIDE, CALCIUM CHLORIDE 600; 310; 30; 20 MG/100ML; MG/100ML; MG/100ML; MG/100ML
INJECTION, SOLUTION INTRAVENOUS CONTINUOUS
Status: DISCONTINUED | OUTPATIENT
Start: 2025-06-13 | End: 2025-06-13

## 2025-06-13 RX ORDER — LIDOCAINE HYDROCHLORIDE 10 MG/ML
INJECTION, SOLUTION EPIDURAL; INFILTRATION; INTRACAUDAL; PERINEURAL AS NEEDED
Status: DISCONTINUED | OUTPATIENT
Start: 2025-06-13 | End: 2025-06-13 | Stop reason: SURG

## 2025-06-13 RX ORDER — NALOXONE HYDROCHLORIDE 0.4 MG/ML
0.08 INJECTION, SOLUTION INTRAMUSCULAR; INTRAVENOUS; SUBCUTANEOUS ONCE AS NEEDED
OUTPATIENT
Start: 2025-06-13 | End: 2025-06-13

## 2025-06-13 RX ADMIN — SODIUM CHLORIDE, SODIUM LACTATE, POTASSIUM CHLORIDE, CALCIUM CHLORIDE: 600; 310; 30; 20 INJECTION, SOLUTION INTRAVENOUS at 15:20:00

## 2025-06-13 RX ADMIN — LIDOCAINE HYDROCHLORIDE 50 MG: 10 INJECTION, SOLUTION EPIDURAL; INFILTRATION; INTRACAUDAL; PERINEURAL at 14:54:00

## 2025-06-13 NOTE — ANESTHESIA POSTPROCEDURE EVALUATION
Patient: Kay Tracy    Procedure Summary       Date: 06/13/25 Room / Location: [facility] ENDOSCOPY 03 / [facility] ENDOSCOPY    Anesthesia Start: 1452 Anesthesia Stop:     Procedure: COLONOSCOPY Diagnosis:       History of colonic polyps      (internal hemorrhoids)    Surgeons: Bobby Ann MD Anesthesiologist: Saiad Chan CRNA    Anesthesia Type: MAC ASA Status: 2            Anesthesia Type: MAC    Vitals Value Taken Time   /58 06/13/25 15:23   Temp 98 06/13/25 15:23   Pulse 88 06/13/25 15:23   Resp 14 06/13/25 15:23   SpO2 100 06/13/25 15:23       [facility] AN Post Evaluation:   Patient Evaluated in PACU  Patient Participation: complete - patient participated  Level of Consciousness: awake  Pain Score: 0  Pain Management: adequate  Airway Patency:patent  Dental exam unchanged from preop  Yes    Nausea/Vomiting: none  Cardiovascular Status: acceptable  Respiratory Status: acceptable  Postoperative Hydration acceptable      Saida Chan CRNA  6/13/2025 3:23 PM

## 2025-06-13 NOTE — DISCHARGE INSTRUCTIONS
.  .  .  Notes from Dr. Ann:    NO colon polyps this time.  Great news.  Very small internal hemorrhoids only on today's colonoscopy exam.  Those may cause occasional blood streaks but are not a problem.    If you are raw and irritated back there after all of that diarrhea and wiping, three good options to soothe and heal the irritation include Aquaphor ointment, \"Desitin\" or \"A & D\" diaper ointment, or good old-fashioned Vaseline.  All of these soothe and create a protective barrier so that your skin can heal.  I recommend repeat colonoscopy examination in 5 years.  .  .  .     Home Care Instructions for Colonoscopy with Sedation    Diet:  - Resume your regular diet as tolerated unless otherwise instructed.  - Start with light meals to minimize bloating.  - Do not drink alcohol today.    Medication:  - If you have questions about resuming your normal medications, please contact your Primary Care Physician.    Activities:  - Take it easy today. Do not return to work today.  - Do not drive today.  - Do not operate any machinery today (including kitchen equipment).    Colonoscopy:  - You may notice some rectal \"spotting\" (a little blood on the toilet tissue) for a day or two after the exam. This is normal.  - If you experience any rectal bleeding (not spotting), persistent tenderness or sharp severe abdominal pains, oral temperature over 100 degrees Fahrenheit, light-headedness or dizziness, or any other problems, contact your doctor.    **If unable to reach your doctor, please go to the [facility] Emergency Room**    - Your referring physician will receive a full report of your examination.  - If you do not hear from your doctor's office within two weeks of your biopsy, please call them for your results.    You may be able to see your laboratory results in [employer] between 4 and 7 business days.  In some cases, your physician may not have viewed the results before they are released to [employer].  If  you have questions regarding your results contact the physician who ordered the test/exam by phone or via [employer] by choosing \"Ask a Medical Question.\"

## 2025-06-13 NOTE — ANESTHESIA PREPROCEDURE EVALUATION
Anesthesia PreOp Note    HPI:     Kay Tracy is a 49 year old female who presents for preoperative consultation requested by: Bobby Ann MD    Date of Surgery: 6/13/2025    Procedure(s):  COLONOSCOPY  Indication: History of colonic polyps    Relevant Problems   No relevant active problems       NPO:                         History Review:  Patient Active Problem List    Diagnosis Date Noted    Kidney stone on right side 07/31/2024    Metabolic acidosis 07/31/2024    Right kidney stone 07/31/2024    Ureteral stent present 07/31/2024    Intractable pain 07/31/2024    Posterior subcapsular age-related cataract of right eye 04/25/2023    Age-related nuclear cataract, left 04/25/2023    Diabetes mellitus type 2 without retinopathy (HCC) 04/25/2023    Anxiety 01/05/2023    Left foot pain 05/10/2022    Achilles tendinitis, left leg 09/12/2018    Aftercare following surgery of the musculoskeletal system 02/27/2018    Left carpal tunnel syndrome 01/26/2018    Intrinsic eczema 01/03/2018    Dyslipidemia 10/31/2017    Excessive bleeding in premenopausal period 04/13/2017    Cubital tunnel syndrome on left 01/15/2016    S/P appendectomy 03/09/2015    Shoulder pain 02/12/2015    PCOS (polycystic ovarian syndrome) 08/09/2011       Past Medical History[1]    Past Surgical History[2]    Prescriptions Prior to Admission[3]  Current Medications and Prescriptions Ordered in Epic[4]    Allergies[5]    Family History[6]  Social Hx on file[7]    Available pre-op labs reviewed.             Vital Signs:  Body mass index is 44.46 kg/m².   height is 1.6 m (5' 3\") and weight is 113.9 kg (251 lb).   Vitals:    06/06/25 1540   Weight: 113.9 kg (251 lb)   Height: 1.6 m (5' 3\")        Anesthesia Evaluation     Patient summary reviewed and Nursing notes reviewed    Airway   Mallampati: II  TM distance: >3 FB  Neck ROM: full  Dental - Dentition appears grossly intact     Pulmonary - normal exam   Cardiovascular - normal  exam    Neuro/Psych    (+)  anxiety/panic attacks,        GI/Hepatic/Renal - negative ROS   (+) bowel prep    Endo/Other    (+) diabetes mellitus type 2  Abdominal  - normal exam                 Anesthesia Plan:   ASA:  2  Plan:   MAC  Informed Consent Plan and Risks Discussed With:  Patient      I have informed Kay Tracy and/or legal guardian or family member of the nature of the anesthetic plan, benefits, risks including possible dental damage if relevant, major complications, and any alternative forms of anesthetic management.   All of the patient's questions were answered to the best of my ability. The patient desires the anesthetic management as planned.  Saida Chan CRNA  2025 12:59 PM  Present on Admission:  **None**           [1]   Past Medical History:   Allergic rhinitis    Asthma    allergy induced    Calculus of kidney    Carpal tunnel syndrome    Cataract    Diabetes (HCC)    diabetes for     Eczema    2-3 years ago, only on right fingers    Essential hypertension    Normal BP readings at home    GDM (gestational diabetes mellitus) (HCC)    High blood pressure    High cholesterol    HORMONE DISORDER    PCOS    Hx of motion sickness    Infertility female    Obesity    Other and unspecified hyperlipidemia    PCOS (polycystic ovarian syndrome)    Seasonal allergies    Shoulder tendonitis    Type II or unspecified type diabetes mellitus without mention of complication, uncontrolled    Visual impairment    glasses   [2]   Past Surgical History:  Procedure Laterality Date    Adenoidectomy      Appendectomy  2015      ,    Repeat low transverse caesarean section 12    Carpal tunnel release Right     Carpal tunnel release Left 2018    Left carpal tunnel release or neuroplasty of the median nerve at the wrist.    Cataract      Cataract extraction w/  intraocular lens implant Right 2023    Dr. Ny @ [facility]    Colonoscopy N/A 02/15/2022     Procedure: COLONOSCOPY;  Surgeon: Bobby Ann MD;  Location: [facility] ENDOSCOPY    Cystoscopy,insert ureteral stent  2024    Cystoscopy, right ureteroscopy, right retrograde pyelogram, ureteral dilation, placement stent    D & c  2017    Dilatation and curettage, hysteroscopy    Laparoscopic appendectomy  2015    Mirena, iud  2024    Other surgical history      IVF    Reduction of large breast  1998    Tonsillectomy  1980   [3]   Medications Prior to Admission   Medication Sig Dispense Refill Last Dose/Taking    metFORMIN  MG Oral Tablet 24 Hr Take 4 tablets (2,000 mg total) by mouth daily with breakfast.   Taking    lisinopril 5 MG Oral Tab Take 1 tablet (5 mg total) by mouth daily. 90 tablet 3 Taking    atorvastatin 40 MG Oral Tab Take 1 tablet (40 mg total) by mouth daily. TAKE 1 TABLET BY MOUTH EVERY DAY AT NIGHT 90 tablet 4 Taking    traZODone 50 MG Oral Tab Take 1 tablet (50 mg total) by mouth nightly as needed for Sleep. 90 tablet 2 Taking As Needed    FARXIGA 10 MG Oral Tab TAKE 1 TABLET BY MOUTH EVERY DAY 90 tablet 1 Taking    levonorgestrel 20 MCG/24HR Intrauterine IUD    Taking    Tirzepatide (MOUNJARO) 10 MG/0.5ML Subcutaneous Solution Auto-injector Inject 10 mg into the skin once a week. (Patient not taking: Reported on 2025) 6 mL 0 Not Taking    [] insulin regular human, conc, (HUMULIN R U-500 KWIKPEN) 500 UNIT/ML Subcutaneous Solution Pen-injector Inject 0.24 mL (120 Units total) into the skin daily with breakfast AND 0.24 mL (120 Units total) daily with lunch AND 0.25 mL (125 Units total) daily with dinner. (Patient taking differently: Inject 0.24 mL (120 Units total) into the skin daily with breakfast AND 0.24 mL (120 Units total) daily with lunch AND 0.25 mL (115 Units total) daily with dinner.) 65.7 mL 0     meclizine 25 MG Oral Tab Take 1 tablet (25 mg total) by mouth 3 (three) times daily as needed for Dizziness. (Patient not taking: Reported on  6/6/2025) 30 tablet 0 Not Taking    Ciclopirox 8 % External Solution Apply 1 mL topically nightly. (Patient not taking: Reported on 6/6/2025) 6.6 mL 1 Not Taking    Continuous Glucose Sensor (DEXCOM G7 SENSOR) Does not apply Misc 1 each Every 10 days. 9 each 1     fluocinonide 0.05 % External Cream Use twice daily on affected areas, itchy bumps on hands (Patient not taking: Reported on 6/6/2025) 60 g 3 Not Taking   [4]   No current Epic-ordered facility-administered medications on file.     No current [facility]-ordered outpatient medications on file.   [5]   Allergies  Allergen Reactions    Cat Dander [Dander] OTHER (SEE COMMENTS)     Itchy eyes    Iodine (Topical) RASH     Skin rash    Nickel RASH    Seasonal OTHER (SEE COMMENTS)     Itchy eyes   [6]   Family History  Problem Relation Age of Onset    Hypertension Father     Obesity Father     Cancer Son         Medulloblastoma    Diabetes Paternal Grandmother     Stroke Paternal Grandmother     Cancer Paternal Grandfather         Esophageal cancer    Glaucoma Neg     Macular degeneration Neg     Breast Cancer Neg     Ovarian Cancer Neg     Prostate Cancer Neg     Pancreatic Cancer Neg    [7]   Social History  Socioeconomic History    Marital status:     Number of children: 1   Occupational History    Occupation: speech therapist   Tobacco Use    Smoking status: Never    Smokeless tobacco: Never   Vaping Use    Vaping status: Never Used   Substance and Sexual Activity    Alcohol use: Never    Drug use: Never    Sexual activity: Yes     Partners: Male     Birth control/protection: I.U.D.   Other Topics Concern    Caffeine Concern Yes     Comment: soda-1 cup/day    Reaction to local anesthetic No    Pt has a pacemaker No    Pt has a defibrillator No

## 2025-06-13 NOTE — H&P
History & Physical Examination    Patient Name: Kay Tracy  MRN: [id number]  [facility]: [id number]  YOB: 1975    Diagnosis: History adenomatous colon polyps    PRESENT ILLNESS: 49-year-old woman with BMI 44.5, history hypertension type 2 diabetes eczema returns for follow-up colonoscopy examination.  Previous colonoscopy examination February 2022 showed 9 significant colon polyps, most of which were tubular adenomas.      BMI Readings from Last 1 Encounters:   06/06/25 44.46 kg/m²         Prescriptions Prior to Admission[1]  Current Hospital Medications[2]    Allergies: Allergies[3]    Past Medical History[4]  Past Surgical History[5]  Family History[6]  Social History     Tobacco Use    Smoking status: Never    Smokeless tobacco: Never   Substance Use Topics    Alcohol use: Never       SYSTEM Check if Review is Normal Check if Physical Exam is Normal If not normal, please explain:   HEENT [ ] [X ]    NECK & BACK [ ] [ ]    HEART [ X ] [ X ]    LUNGS [ X ] [ X ]    ABDOMEN [ X ] [ X ]    UROGENITAL [ ] [ ]    EXTREMITIES [ ] [ ]    OTHER        See Anesthesia documentation    [ x ] I have discussed the risks and benefits and alternatives with the patient/family.  They understand and agree to proceed with plan of care.  [ x ] I have reviewed the History and Physical done within the last 30 days.  Any changes noted above.    Bobby Ann MD  6/13/2025  2:00 PM             [1]   Medications Prior to Admission   Medication Sig Dispense Refill Last Dose/Taking    metFORMIN  MG Oral Tablet 24 Hr Take 4 tablets (2,000 mg total) by mouth daily with breakfast.   6/12/2025 Morning    lisinopril 5 MG Oral Tab Take 1 tablet (5 mg total) by mouth daily. 90 tablet 3 Taking    atorvastatin 40 MG Oral Tab Take 1 tablet (40 mg total) by mouth daily. TAKE 1 TABLET BY MOUTH EVERY DAY AT NIGHT 90 tablet 4 6/12/2025    traZODone 50 MG Oral Tab Take 1 tablet (50 mg total) by mouth nightly as needed  for Sleep. 90 tablet 2 Taking As Needed    FARXIGA 10 MG Oral Tab TAKE 1 TABLET BY MOUTH EVERY DAY 90 tablet 1 2025    levonorgestrel 20 MCG/24HR Intrauterine IUD    Taking    Tirzepatide (MOUNJARO) 10 MG/0.5ML Subcutaneous Solution Auto-injector Inject 10 mg into the skin once a week. (Patient not taking: Reported on 2025) 6 mL 0 Not Taking    [] insulin regular human, conc, (HUMULIN R U-500 KWIKPEN) 500 UNIT/ML Subcutaneous Solution Pen-injector Inject 0.24 mL (120 Units total) into the skin daily with breakfast AND 0.24 mL (120 Units total) daily with lunch AND 0.25 mL (125 Units total) daily with dinner. (Patient taking differently: Inject 0.24 mL (120 Units total) into the skin daily with breakfast AND 0.24 mL (120 Units total) daily with lunch AND 0.25 mL (115 Units total) daily with dinner.) 65.7 mL 0     meclizine 25 MG Oral Tab Take 1 tablet (25 mg total) by mouth 3 (three) times daily as needed for Dizziness. (Patient not taking: Reported on 2025) 30 tablet 0 Not Taking    Ciclopirox 8 % External Solution Apply 1 mL topically nightly. (Patient not taking: Reported on 2025) 6.6 mL 1 Not Taking    Continuous Glucose Sensor (DEXCOM G7 SENSOR) Does not apply Misc 1 each Every 10 days. 9 each 1     fluocinonide 0.05 % External Cream Use twice daily on affected areas, itchy bumps on hands (Patient not taking: Reported on 2025) 60 g 3 Not Taking   [2]   Current Facility-Administered Medications   Medication Dose Route Frequency    lactated ringers infusion   Intravenous Continuous   [3]   Allergies  Allergen Reactions    Cat Dander [Dander] OTHER (SEE COMMENTS)     Itchy eyes    Iodine (Topical) RASH     Skin rash    Nickel RASH    Seasonal OTHER (SEE COMMENTS)     Itchy eyes   [4]   Past Medical History:   Allergic rhinitis    Asthma    allergy induced    Calculus of kidney    Carpal tunnel syndrome    Cataract    Diabetes (HCC)    diabetes for     Eczema    2-3 years ago, only on  right fingers    Essential hypertension    Normal BP readings at home    GDM (gestational diabetes mellitus) (HCC)    High blood pressure    High cholesterol    HORMONE DISORDER    PCOS    Hx of motion sickness    Infertility female    Obesity    Other and unspecified hyperlipidemia    PCOS (polycystic ovarian syndrome)    Seasonal allergies    Shoulder tendonitis    Type II or unspecified type diabetes mellitus without mention of complication, uncontrolled    Visual impairment    glasses   [5]   Past Surgical History:  Procedure Laterality Date    Adenoidectomy      Appendectomy  2015      ,    Repeat low transverse caesarean section 12    Carpal tunnel release Right     Carpal tunnel release Left 2018    Left carpal tunnel release or neuroplasty of the median nerve at the wrist.    Cataract      Cataract extraction w/  intraocular lens implant Right 2023    Dr. Ny @ [facility]    Colonoscopy N/A 02/15/2022    Procedure: COLONOSCOPY;  Surgeon: Bobby Ann MD;  Location: [facility] ENDOSCOPY    Cystoscopy,insert ureteral stent  2024    Cystoscopy, right ureteroscopy, right retrograde pyelogram, ureteral dilation, placement stent    D & c  2017    Dilatation and curettage, hysteroscopy    Laparoscopic appendectomy  2015    Mirena, iud  2024    Other surgical history      IVF    Reduction of large breast  1998    Tonsillectomy     [6]   Family History  Problem Relation Age of Onset    Hypertension Father     Obesity Father     Cancer Son         Medulloblastoma    Diabetes Paternal Grandmother     Stroke Paternal Grandmother     Cancer Paternal Grandfather         Esophageal cancer    Glaucoma Neg     Macular degeneration Neg     Breast Cancer Neg     Ovarian Cancer Neg     Prostate Cancer Neg     Pancreatic Cancer Neg

## 2025-06-13 NOTE — OPERATIVE REPORT
[facility]    COLONOSCOPY PROCEDURE REPORT     DATE OF PROCEDURE:  6/13/2025     PCP: Jessica Wong MD     PREOPERATIVE DIAGNOSIS: History adenomatous colon polyps     POSTOPERATIVE DIAGNOSIS:  See impression.     SURGEON:  Bobby Ann M.D.     SEDATION:    MAC anesthesia provided by the Anesthesia Service.  MAC anesthesia requested due to  anticipated intolerance of colonoscopy examination under safe doses conscious sedation medications       COLONOSCOPY PROCEDURE:   After the nature and risks of colonoscopy examination under MAC anesthesia were discussed with the patient and all questions answered, informed consent was obtained.  The patient was sedated as above.      Digital rectal exam was performed which showed no masses.  The Olympus pediatric video colonoscope was placed in the patient's rectum and advanced under direct visualization through the entire length of the colon up to the cecum and terminal ileum.  Retroflex exam performed up the ascending colon to the hepatic flexure.  The cecum was confirmed by landmarks including appendiceal orifice, cecal trifold, ileocecal valve.  Retroflexion was performed in the rectum.    The quality of the prep was excellent.    Estimated blood loss: none/insignificant       COLONOSCOPY FINDINGS:    Small internal hemorrhoids only on excellent prep colonoscopy examination to the cecum and terminal ileum as above.      RECOMMENDATIONS:  High fiber diet.  Repeat colonoscopy examination in 5 years due to multiple adenomatous colon polyps on previous exam 3 years ago February 2022.

## 2025-06-18 ENCOUNTER — OFFICE VISIT (OUTPATIENT)
Dept: DERMATOLOGY CLINIC | Facility: CLINIC | Age: 50
End: 2025-06-18

## 2025-06-18 DIAGNOSIS — D48.5 NEOPLASM OF UNCERTAIN BEHAVIOR OF SKIN: Primary | ICD-10-CM

## 2025-06-18 DIAGNOSIS — L72.0 EPIDERMAL CYST: ICD-10-CM

## 2025-06-18 PROCEDURE — 17270 DSTR MAL LES S/N/H/F/G .5 /<: CPT | Performed by: DERMATOLOGY

## 2025-06-18 PROCEDURE — 88304 TISSUE EXAM BY PATHOLOGIST: CPT | Performed by: DERMATOLOGY

## 2025-06-18 RX ORDER — POLYETHYLENE GLYCOL-3350 AND ELECTROLYTES 236; 6.74; 5.86; 2.97; 22.74 G/274.31G; G/274.31G; G/274.31G; G/274.31G; G/274.31G
4 POWDER, FOR SOLUTION ORAL ONCE
COMMUNITY
Start: 2025-06-04

## 2025-06-18 RX ORDER — AMOXICILLIN 500 MG/1
CAPSULE ORAL
COMMUNITY
Start: 2025-05-14

## 2025-06-18 RX ORDER — ONDANSETRON 4 MG/1
4 TABLET, ORALLY DISINTEGRATING ORAL AS DIRECTED
COMMUNITY

## 2025-06-18 NOTE — PROGRESS NOTES
The following individual(s) verbally consented to be recorded using ambient AI listening technology and understand that they can each withdraw their consent to this listening technology at any point by asking the clinician to turn off or pause the recording:    Patient name: Kay Kimberly Tracy  Additional names:

## 2025-06-21 NOTE — PROGRESS NOTES
The pathology report from last visit showed     right lateral neck, excision:  - Epidermoid cyst.  Please log in test results, send biopsy results letter.  Pt to rtc 1 year or prn.

## 2025-06-23 NOTE — PROGRESS NOTES
Operative Report Excision      Clinical diagnosis:    Size of lesion:With cyst, intermittently inflamed , epidermal cyst 4.5mm r/o other right lateral neck,     Location:    Excision dimensions: Length:4mm                                         Width:4mm    Suture: Skin surface:         Simple interrupted:    Vicryl Rapide:   4-0      #4  Procedure:  Appropriate timeout taken location of lesion confirmed with patient and staff.    With patient in appropriate position the skin of the above was scrubbed with Hibiclens and alcohol.  Anesthesia was obtained with 1% Xylocaine with epinephrine.        Fusiform excision with dimensions above was performed.  To subcutaneous tissue wound margins were undermined and hemostasis obtained with pinpoint electrocoagulation.    Due to the size and location and functional aspects of the defect and to minimize tension on wound edges intermediate layered closure performed.       The wound margins were closed with subcutaneous sutures as noted, and cutaneous sutures as noted above      The specimen was sent for histopathologic exam.      Hemostasis was obtained with suture.    Estimated blood loss less than 5 cc.    Biopsy dressed with Steri-Strips, bandage/other    Pressure dressing: Applied as appropriate    Complications: None    Suture removal planned in 10-14 days    Written instructions given and reviewed with patient    Await pathology    Contact information reviewed.    Procedural physician:  Marlene Zambrano MD

## 2025-06-23 NOTE — PROGRESS NOTES
Kay Tracy is a 49 year old female.  HPI:     CC:    Chief Complaint   Patient presents with    Procedure     LOV 2025.  Pt presents for Epidermal cyst on the lateral neck. Right posterior 5 x 6 mm plan excision.             Allergies:  Cat dander [dander], Iodine (topical), Nickel, and Seasonal    HISTORY:    Past Medical History:    Allergic rhinitis    Asthma    allergy induced    Calculus of kidney    Carpal tunnel syndrome    Cataract    Diabetes (HCC)    diabetes for     Eczema    2-3 years ago, only on right fingers    Essential hypertension    Normal BP readings at home    GDM (gestational diabetes mellitus) (HCC)    High blood pressure    High cholesterol    HORMONE DISORDER    PCOS    Hx of motion sickness    Infertility female    Obesity    Other and unspecified hyperlipidemia    PCOS (polycystic ovarian syndrome)    Seasonal allergies    Shoulder tendonitis    Type II or unspecified type diabetes mellitus without mention of complication, uncontrolled    Visual impairment    glasses      Past Surgical History:   Procedure Laterality Date    Adenoidectomy      Appendectomy  2015      ,    Repeat low transverse caesarean section 12    Carpal tunnel release Right     Carpal tunnel release Left 2018    Left carpal tunnel release or neuroplasty of the median nerve at the wrist.    Cataract      Cataract extraction w/  intraocular lens implant Right 2023    Dr. Ny @ [facility]    Colonoscopy N/A 02/15/2022    Procedure: COLONOSCOPY;  Surgeon: Bobby Ann MD;  Location: [facility] ENDOSCOPY    Colonoscopy N/A 2025    Procedure: COLONOSCOPY;  Surgeon: Bobby Ann MD;  Location: [facility] ENDOSCOPY    Cystoscopy,insert ureteral stent  2024    Cystoscopy, right ureteroscopy, right retrograde pyelogram, ureteral dilation, placement stent    D & c  2017    Dilatation and curettage, hysteroscopy    Laparoscopic appendectomy   02/27/2015    Mirena, iud  02/12/2024    Other surgical history      IVF    Reduction of large breast  1998    Tonsillectomy  1980      Family History   Problem Relation Age of Onset    Hypertension Father     Obesity Father     Cancer Son         Medulloblastoma    Diabetes Paternal Grandmother     Stroke Paternal Grandmother     Cancer Paternal Grandfather         Esophageal cancer    Glaucoma Neg     Macular degeneration Neg     Breast Cancer Neg     Ovarian Cancer Neg     Prostate Cancer Neg     Pancreatic Cancer Neg       Social History     Socioeconomic History    Marital status:     Number of children: 1   Occupational History    Occupation: speech therapist   Tobacco Use    Smoking status: Never    Smokeless tobacco: Never   Vaping Use    Vaping status: Never Used   Substance and Sexual Activity    Alcohol use: Never    Drug use: Never    Sexual activity: Yes     Partners: Male     Birth control/protection: I.U.D.   Other Topics Concern    Caffeine Concern Yes     Comment: soda-1 cup/day    Grew up on a farm No    History of tanning Yes    Outdoor occupation No    Breast feeding No    Reaction to local anesthetic No    Pt has a pacemaker No    Pt has a defibrillator No     Social Drivers of Health     Food Insecurity: No Food Insecurity (7/31/2024)    Food Insecurity     Food Insecurity: Never true   Transportation Needs: No Transportation Needs (8/2/2024)    Transportation Needs     Lack of Transportation: No   Housing Stability: Low Risk  (7/31/2024)    Housing Stability     Housing Instability: No        Current Outpatient Medications   Medication Sig Dispense Refill    amoxicillin 500 MG Oral Cap TAKE 1 CAPSULE BY MOUTH THREE TIMES A DAY UNTIL GONE (Patient not taking: Reported on 6/18/2025)      ondansetron 4 MG Oral Tablet Dispersible Take 1 tablet (4 mg total) by mouth As Directed.      GAVILYTE-G 236 g Oral Recon Soln Take 4 mL by mouth one time. (Patient not taking: Reported on 6/18/2025)       Tirzepatide (MOUNJARO) 10 MG/0.5ML Subcutaneous Solution Auto-injector Inject 10 mg into the skin once a week. (Patient not taking: Reported on 6/18/2025) 6 mL 0    metFORMIN  MG Oral Tablet 24 Hr Take 4 tablets (2,000 mg total) by mouth daily with breakfast.      meclizine 25 MG Oral Tab Take 1 tablet (25 mg total) by mouth 3 (three) times daily as needed for Dizziness. (Patient not taking: Reported on 6/18/2025) 30 tablet 0    lisinopril 5 MG Oral Tab Take 1 tablet (5 mg total) by mouth daily. 90 tablet 3    atorvastatin 40 MG Oral Tab Take 1 tablet (40 mg total) by mouth daily. TAKE 1 TABLET BY MOUTH EVERY DAY AT NIGHT 90 tablet 4    traZODone 50 MG Oral Tab Take 1 tablet (50 mg total) by mouth nightly as needed for Sleep. 90 tablet 2    FARXIGA 10 MG Oral Tab TAKE 1 TABLET BY MOUTH EVERY DAY 90 tablet 1    Ciclopirox 8 % External Solution Apply 1 mL topically nightly. (Patient not taking: Reported on 6/18/2025) 6.6 mL 1    Continuous Glucose Sensor (DEXCOM G7 SENSOR) Does not apply Misc 1 each Every 10 days. 9 each 1    fluocinonide 0.05 % External Cream Use twice daily on affected areas, itchy bumps on hands (Patient not taking: Reported on 6/18/2025) 60 g 3    levonorgestrel 20 MCG/24HR Intrauterine IUD        Allergies:   Allergies   Allergen Reactions    Cat Dander [Dander] OTHER (SEE COMMENTS)     Itchy eyes    Iodine (Topical) RASH     Skin rash    Nickel RASH    Seasonal OTHER (SEE COMMENTS)     Itchy eyes       Past Medical History:    Allergic rhinitis    Asthma    allergy induced    Calculus of kidney    Carpal tunnel syndrome    Cataract    Diabetes (HCC)    diabetes for 2011    Eczema    2-3 years ago, only on right fingers    Essential hypertension    Normal BP readings at home    GDM (gestational diabetes mellitus) (HCC)    High blood pressure    High cholesterol    HORMONE DISORDER    PCOS    Hx of motion sickness    Infertility female    Obesity    Other and unspecified hyperlipidemia     PCOS (polycystic ovarian syndrome)    Seasonal allergies    Shoulder tendonitis    Type II or unspecified type diabetes mellitus without mention of complication, uncontrolled    Visual impairment    glasses     Past Surgical History:   Procedure Laterality Date    Adenoidectomy  1980    Appendectomy  2015      ,    Repeat low transverse caesarean section 12    Carpal tunnel release Right     Carpal tunnel release Left 2018    Left carpal tunnel release or neuroplasty of the median nerve at the wrist.    Cataract      Cataract extraction w/  intraocular lens implant Right 2023    Dr. Ny @ [facility]    Colonoscopy N/A 02/15/2022    Procedure: COLONOSCOPY;  Surgeon: Bobby Ann MD;  Location: [facility] ENDOSCOPY    Colonoscopy N/A 2025    Procedure: COLONOSCOPY;  Surgeon: Bobby Ann MD;  Location: [facility] ENDOSCOPY    Cystoscopy,insert ureteral stent  2024    Cystoscopy, right ureteroscopy, right retrograde pyelogram, ureteral dilation, placement stent    D & c  2017    Dilatation and curettage, hysteroscopy    Laparoscopic appendectomy  2015    Mirena, iud  2024    Other surgical history      IVF    Reduction of large breast  1998    Tonsillectomy       Social History     Socioeconomic History    Marital status:      Spouse name: Not on file    Number of children: 1    Years of education: Not on file    Highest education level: Not on file   Occupational History    Occupation: speech therapist   Tobacco Use    Smoking status: Never    Smokeless tobacco: Never   Vaping Use    Vaping status: Never Used   Substance and Sexual Activity    Alcohol use: Never    Drug use: Never    Sexual activity: Yes     Partners: Male     Birth control/protection: I.U.D.   Other Topics Concern     Service Not Asked    Blood Transfusions Not Asked    Caffeine Concern Yes     Comment: soda-1 cup/day    Occupational Exposure Not Asked     Hobby Hazards Not Asked    Sleep Concern Not Asked    Stress Concern Not Asked    Weight Concern Not Asked    Special Diet Not Asked    Back Care Not Asked    Exercise Not Asked    Bike Helmet Not Asked    Seat Belt Not Asked    Self-Exams Not Asked    Grew up on a farm No    History of tanning Yes    Outdoor occupation No    Breast feeding No    Reaction to local anesthetic No    Pt has a pacemaker No    Pt has a defibrillator No   Social History Narrative    Not on file     Social Drivers of Health     Food Insecurity: No Food Insecurity (7/31/2024)    Food Insecurity     Food Insecurity: Never true   Transportation Needs: No Transportation Needs (8/2/2024)    Transportation Needs     Lack of Transportation: No     Car Seat: Not on file   Stress: Not on file   Housing Stability: Low Risk  (7/31/2024)    Housing Stability     Housing Instability: No     Housing Instability Emergency: Not on file     Crib or [place]: Not on file     Family History   Problem Relation Age of Onset    Hypertension Father     Obesity Father     Cancer Son         Medulloblastoma    Diabetes Paternal Grandmother     Stroke Paternal Grandmother     Cancer Paternal Grandfather         Esophageal cancer    Glaucoma Neg     Macular degeneration Neg     Breast Cancer Neg     Ovarian Cancer Neg     Prostate Cancer Neg     Pancreatic Cancer Neg        There were no vitals filed for this visit.    HPI:  Chief Complaint   Patient presents with    Procedure     LOV 5/2025.  Pt presents for Epidermal cyst on the lateral neck. Right posterior 5 x 6 mm plan excision.         Fair skin lots of sun in the past.  Outdoors with her children and sports  No personal  or family history of skin cancer    History of Present Illness  Kay Tracy is a 49 year old female who presents with a mole on her cheek and a cyst on the side of her neck.    She has a mole on her cheek that has remained unchanged with no new symptoms or changes in appearance.  She is concerned about the mole and seeks evaluation to ensure it is benign.    She also has a cyst on the side of her neck. She inquires about the need for a referral due to her HMO insurance, indicating a potential concern for coverage of the procedure.      Kay Tracy is a 49 year old female who presents with a persistent lesion on her right eye.    She has a lesion on her right eye that has been present for a long time and has not resolved spontaneously. The lesion was initially much larger, approximately three times its current size, but has since decreased in size.        Patient presents with concerns above.    Patient has been in their usual state of health.     Past notes/ records and appropriate/relevant lab results including pathology and past body maps reviewed. Including outside notes/ PCP notes as appropriate. Updated and new information noted in current visit.     ROS:  Denies other relevant systemic complaints.      History, medications, allergies reviewed as noted.       Physical Examination:     Well-developed well-nourished patient alert oriented in no acute distress.  Exam performed, including scalp, head, neck, face,nails, hair, external eyes, including conjunctival mucosa, eyelids, lips external ears , arms, digits,palms.     Multiple light to medium brown, well marginated, uniformly pigmented, macules and papules 6 mm and less scattered on exam. pigmented lesions examined with dermoscopy benign-appearing patterns.     Waxy tannish keratotic papules scattered, cherry-red vascular papules scattered.    See map today's date for lesions noted .  See assessment and plan below for specific lesions.    Otherwise remarkable for lesions as noted on map.    See A/P  below for additional information:    Assessment / plan:    Orders Placed This Encounter   Procedures    Specimen to Pathology, Tissue [[facility] Pt to [facility] lab]       Meds & Refills for this Visit:  Requested Prescriptions      No  prescriptions requested or ordered in this encounter         Encounter Diagnoses   Name Primary?    Neoplasm of uncertain behavior of skin Yes    Epidermal cyst          Physical Exam  SKIN: Mole on cheek unchanged. Cyst on side of neck present.      HEENT: Lesion on the right eye, very tiny.    Results      Assessment & Plan  Epidermal cyst  Epidermal cyst on the lateral neck.  Right posterior 5 x 6 mm plan excision  - Schedule cyst excision.  - Use absorbable sutures for closure.  - Advise to keep the area dry for one week post-procedure, avoiding pool water.      Skin lesion on right neck     History of chronic skin lesion on the right neck,  reduced in size from its initial presentation, excision performed    Recording duration: 1 minute        With cyst, intermittently inflamed , epidermal cyst 4.5mm r/o other right lateral neck,   EXCISION: This lesion should be excised. This will result in a permanent scar, which will be longer than the size of the lesion. Further excision may be necessary, if tumor is present microscopically on the pathology report. This may result in a longer scar. Recurrence may occur, which will require further surgery.    Excision- excision performed, operative note and consent in chart.  Postoperative instructions given.  Instructions reviewed with patient.  Will followup in  7-10  days for suture removal.  Further plans pending pathology.    4-0 vicryl rapide- dissolving no need for suture removal appointment. Expectations, care, instructions reviewed.       History of eczema continue current management  Dyshidrotic changes over the hands      See patient photos  Dermatitis.  Meds in grid.  Skin care instructions reviewed.  [place] use of emollients.  Pathophysiology reviewed.  Consider Contac allergy in differential.  Consider patch testing.  Patient will let us know how they are doing over the next several weeks.  Await clinical response to above therapies.  Lidex cream twice daily,  continue current moisturizers, if worsening will let us know.    More waxy tan keratotic papule at left temple trial of cryo reassurance.    Extensive nevi no atypical features    History of epidermal cyst at left hip stable.  Cyst on upper abdomen stable  Blue nevus left cheek stable, scattered smaller nevi stable reassurance.  Multiple cherry neuromas as noted previously reassurance.  No suspicious nevi.  Continue sun protection regular skin checks.    Fair skin more erythema over the face,No other susupicious lesions on todays  exam.      No other susupicious lesions on todays  exam.      Please refer to map for specific lesions.  See additional diagnoses.  Pros cons of various therapies, risks benefits discussed.Pathophysiology discussed with patient.  Therapeutic options reviewed.  See  Medications in grid.  Instructions reviewed at length.    Benign nevi, seborrheic  keratoses, cherry angiomas:  Reassurance regarding other benign skin lesions.    Monitor for new or changing lesions. Signs and symptoms of skin cancer, ABCDE's of melanoma ( additional information available at AAD.org, skincancer.org) Encourage Sunscreen (broad-spectrum, ideally mineral-based-UVA/UVB -SPF 30 or higher) use encouraged, sun protection/sun protective clothing, self exams reviewed Followup as noted RTC ---routine checkup 6 mos -one year or p.r.n.    Encounter Times   Including precharting, reviewing chart, prior notes obtaining history: 10 minutes, medical exam :10 minutes, notes on body map, plan, counseling 10minutes My total time spent caring for the patient on the day of the encounter: 30 minutes     The patient indicates understanding of these issues and agrees to the plan.  The patient is asked to return as noted in follow-up/ above.    This note was generated using Dragon voice recognition software.  Please contact me regarding any confusion resulting from errors in recognition..  Note to patient and family: The 21st Century  Cures Act makes medical notes like these available to patients. However, be advised this is a medical document. It is intended as [id number] communication and monitoring of a patient's care needs. It is written in medical language and may contain abbreviations or verbiage that are unfamiliar. It may appear blunt or direct. Medical documents are intended to carry relevant information, facts as evident and the clinical opinion of the practitioner.

## 2025-06-24 NOTE — TELEPHONE ENCOUNTER
"CHW Alejandra met with patient bedside and pt states that due to pt's decline in health pt and spouse have chosen to postpone inpatient treatment at this time. Pt states pt knows that pt cannot have a drink or pt will die. This CHW expressed the concern for relapse upon returning home and is hoping that this is not an excuse to delay treatment. Pt states \" I do not want to talk about inpatient treatment right now, as I woke feeling upset about what I have done to myself and my health, and I have concerns with the blood clots on my liver and I do not want to be away from family and my doctors if something would happen\".   Called Dr Tillman [phone] and discussed the above conversation with pt. Dr Tillman states they are remaining open to treatment, concerns about current condition of health is the main concern at this time and length of stay as pt's numbers are climbing and pt had a difficult trying day of emotions.   CHW will continue to assist with pt needs.     " Endocrine Refill protocol for oral and injectable diabetic medications    Protocol Criteria:    -Appointment with Endocrinology completed in the last 6 months or scheduled in the next 3 months    -A1c result <8.5% in the past 6 months      Verify the above has been completed or scheduled in the appropriate timeline. If so can send a 90 day supply with 1 refill.      Last completed office visit: 10/17/23  Next scheduled Follow up: 4/23/24    Last A1c result:   Component      Latest Ref Rng 10/17/2023   HEMOGLOBIN A1C      4.3 - 5.6 % 6.1 !

## 2025-06-30 ENCOUNTER — LAB ENCOUNTER (OUTPATIENT)
Dept: LAB | Age: 50
End: 2025-06-30
Attending: FAMILY MEDICINE
Payer: COMMERCIAL

## 2025-06-30 DIAGNOSIS — E55.9 VITAMIN D DEFICIENCY: ICD-10-CM

## 2025-06-30 DIAGNOSIS — Z00.00 ROUTINE MEDICAL EXAM: ICD-10-CM

## 2025-06-30 DIAGNOSIS — E11.9 DIABETES MELLITUS TYPE 2 WITHOUT RETINOPATHY (HCC): ICD-10-CM

## 2025-06-30 LAB
ALBUMIN SERPL-MCNC: 4.3 G/DL (ref 3.2–4.8)
ALBUMIN/GLOB SERPL: 2 {RATIO} (ref 1–2)
ALP LIVER SERPL-CCNC: 88 U/L (ref 39–100)
ALT SERPL-CCNC: 32 U/L (ref 10–49)
ANION GAP SERPL CALC-SCNC: 8 MMOL/L (ref 0–18)
AST SERPL-CCNC: 29 U/L (ref ?–34)
BASOPHILS # BLD AUTO: 0.05 X10(3) UL (ref 0–0.2)
BASOPHILS NFR BLD AUTO: 0.6 %
BILIRUB SERPL-MCNC: 0.8 MG/DL (ref 0.3–1.2)
BILIRUB UR QL: NEGATIVE
BUN BLD-MCNC: 6 MG/DL (ref 9–23)
BUN/CREAT SERPL: 8.7 (ref 10–20)
CALCIUM BLD-MCNC: 8.8 MG/DL (ref 8.7–10.4)
CHLORIDE SERPL-SCNC: 109 MMOL/L (ref 98–112)
CHOLEST SERPL-MCNC: 156 MG/DL (ref ?–200)
CLARITY UR: CLEAR
CO2 SERPL-SCNC: 26 MMOL/L (ref 21–32)
CREAT BLD-MCNC: 0.69 MG/DL (ref 0.55–1.02)
CREAT UR-SCNC: 81.7 MG/DL
DEPRECATED RDW RBC AUTO: 42.1 FL (ref 35.1–46.3)
EGFRCR SERPLBLD CKD-EPI 2021: 107 ML/MIN/1.73M2 (ref 60–?)
EOSINOPHIL # BLD AUTO: 0.32 X10(3) UL (ref 0–0.7)
EOSINOPHIL NFR BLD AUTO: 3.8 %
ERYTHROCYTE [DISTWIDTH] IN BLOOD BY AUTOMATED COUNT: 12.7 % (ref 11–15)
FASTING PATIENT LIPID ANSWER: YES
FASTING STATUS PATIENT QL REPORTED: YES
GLOBULIN PLAS-MCNC: 2.2 G/DL (ref 2–3.5)
GLUCOSE BLD-MCNC: 165 MG/DL (ref 70–99)
GLUCOSE UR-MCNC: >1000 MG/DL
HCT VFR BLD AUTO: 46 % (ref 35–48)
HDLC SERPL-MCNC: 46 MG/DL (ref 40–59)
HGB BLD-MCNC: 14.7 G/DL (ref 12–16)
IMM GRANULOCYTES # BLD AUTO: 0.02 X10(3) UL (ref 0–1)
IMM GRANULOCYTES NFR BLD: 0.2 %
KETONES UR-MCNC: NEGATIVE MG/DL
LDLC SERPL CALC-MCNC: 89 MG/DL (ref ?–100)
LEUKOCYTE ESTERASE UR QL STRIP.AUTO: NEGATIVE
LYMPHOCYTES # BLD AUTO: 2.32 X10(3) UL (ref 1–4)
LYMPHOCYTES NFR BLD AUTO: 27.5 %
MCH RBC QN AUTO: 28.9 PG (ref 26–34)
MCHC RBC AUTO-ENTMCNC: 32 G/DL (ref 31–37)
MCV RBC AUTO: 90.4 FL (ref 80–100)
MICROALBUMIN UR-MCNC: <0.3 MG/DL
MONOCYTES # BLD AUTO: 0.62 X10(3) UL (ref 0.1–1)
MONOCYTES NFR BLD AUTO: 7.4 %
NEUTROPHILS # BLD AUTO: 5.1 X10 (3) UL (ref 1.5–7.7)
NEUTROPHILS # BLD AUTO: 5.1 X10(3) UL (ref 1.5–7.7)
NEUTROPHILS NFR BLD AUTO: 60.5 %
NITRITE UR QL STRIP.AUTO: NEGATIVE
NONHDLC SERPL-MCNC: 110 MG/DL (ref ?–130)
OSMOLALITY SERPL CALC.SUM OF ELEC: 297 MOSM/KG (ref 275–295)
PH UR: 5.5 [PH] (ref 5–8)
PLATELET # BLD AUTO: 240 10(3)UL (ref 150–450)
POTASSIUM SERPL-SCNC: 4.3 MMOL/L (ref 3.5–5.1)
PROT SERPL-MCNC: 6.5 G/DL (ref 5.7–8.2)
PROT UR-MCNC: NEGATIVE MG/DL
RBC # BLD AUTO: 5.09 X10(6)UL (ref 3.8–5.3)
SODIUM SERPL-SCNC: 143 MMOL/L (ref 136–145)
SP GR UR STRIP: 1.03 (ref 1–1.03)
TRIGL SERPL-MCNC: 114 MG/DL (ref 30–149)
TSI SER-ACNC: 1.3 UIU/ML (ref 0.55–4.78)
UROBILINOGEN UR STRIP-ACNC: NORMAL
VIT B12 SERPL-MCNC: 349 PG/ML (ref 211–911)
VIT D+METAB SERPL-MCNC: 28.2 NG/ML (ref 30–100)
VLDLC SERPL CALC-MCNC: 18 MG/DL (ref 0–30)
WBC # BLD AUTO: 8.4 X10(3) UL (ref 4–11)

## 2025-06-30 PROCEDURE — 85025 COMPLETE CBC W/AUTO DIFF WBC: CPT

## 2025-06-30 PROCEDURE — 82570 ASSAY OF URINE CREATININE: CPT

## 2025-06-30 PROCEDURE — 82043 UR ALBUMIN QUANTITATIVE: CPT

## 2025-06-30 PROCEDURE — 80061 LIPID PANEL: CPT

## 2025-06-30 PROCEDURE — 81001 URINALYSIS AUTO W/SCOPE: CPT

## 2025-06-30 PROCEDURE — 80053 COMPREHEN METABOLIC PANEL: CPT

## 2025-06-30 PROCEDURE — 84443 ASSAY THYROID STIM HORMONE: CPT

## 2025-06-30 PROCEDURE — 82306 VITAMIN D 25 HYDROXY: CPT

## 2025-06-30 PROCEDURE — 82607 VITAMIN B-12: CPT

## 2025-06-30 PROCEDURE — 36415 COLL VENOUS BLD VENIPUNCTURE: CPT

## 2025-07-03 NOTE — PROGRESS NOTES
Sd Villanueva - Vitamin D levels are a bit low. Take extra vitamin D 2000 units daily to boost those levels. Rest of the labs are stable. No urinary tract infection. - Dr. Wong

## 2025-07-22 RX ORDER — METFORMIN HYDROCHLORIDE 500 MG/1
2000 TABLET, EXTENDED RELEASE ORAL
Qty: 360 TABLET | Refills: 3 | OUTPATIENT
Start: 2025-07-22

## 2025-07-22 RX ORDER — METFORMIN HYDROCHLORIDE 500 MG/1
TABLET, EXTENDED RELEASE ORAL
Qty: 360 TABLET | Refills: 3 | OUTPATIENT
Start: 2025-07-22

## 2025-07-25 ENCOUNTER — TELEPHONE (OUTPATIENT)
Dept: ENDOCRINOLOGY CLINIC | Facility: CLINIC | Age: 50
End: 2025-07-25

## 2025-07-25 DIAGNOSIS — E11.3299 TYPE 2 DIABETES MELLITUS WITH MILD NONPROLIFERATIVE RETINOPATHY WITHOUT MACULAR EDEMA, WITH LONG-TERM CURRENT USE OF INSULIN, UNSPECIFIED LATERALITY (HCC): Primary | ICD-10-CM

## 2025-07-25 DIAGNOSIS — Z79.4 TYPE 2 DIABETES MELLITUS WITH MILD NONPROLIFERATIVE RETINOPATHY WITHOUT MACULAR EDEMA, WITH LONG-TERM CURRENT USE OF INSULIN, UNSPECIFIED LATERALITY (HCC): Primary | ICD-10-CM

## 2025-07-25 RX ORDER — INSULIN HUMAN 500 [IU]/ML
INJECTION, SOLUTION SUBCUTANEOUS
Qty: 44.1 ML | Refills: 1 | Status: SHIPPED | OUTPATIENT
Start: 2025-07-25

## 2025-07-25 NOTE — TELEPHONE ENCOUNTER
Patient called, verified Name and . Following up on refill of metformin. States she called pharmacy and was told refill was denied. Relayed medication is managed by Endocrinology, verbalized understanding. Call transferred to Endo.

## 2025-07-25 NOTE — TELEPHONE ENCOUNTER
Endocrine refill protocol for rapid acting, regular, intermediate, and mixed insulin:    Protocol Criteria:  PASSED Reason: N/A    If all below requirements are met, send a 90-day supply with 1 refill per provider protocol.    Verify appointment with Endocrinology completed in the last 6 months or scheduled in the next 3 months.  Verify A1C has been completed within the last 6 months and is below 8.5%    -May substitute prescriptions for Novolog and Humalog unless documented allergy (pens and vials) at the same dose and concentration per insurance preference and provider protocol.   -May substitute prescriptions for Novolin R and Humulin R unless documented allergy (pens and vials) at the same dose and concentration per insurance preference and provider protocol.   -May substitute prescriptions for Novolin N and Humulin N unless documented allergy (pens and vials) at the same dose and concentration per insurance preference and provider protocol.   -May substitute prescriptions for Humulin and Novolin 70/30 insulin unless documented allergy at the same dose and concentration per insurance preference and provider protocol.    Last completed office visit:6/3/2025 Sorin Persaud APRN   Last completed telemed visit: Visit date not found  Next scheduled Follow up:   Future Appointments   Date Time Provider Department Center   10/14/2025  4:00 PM Sorin Persaud APRN [facility]             Last A1C result: 7.5% done 6/3/2025.

## 2025-07-28 RX ORDER — METFORMIN HYDROCHLORIDE 500 MG/1
TABLET, EXTENDED RELEASE ORAL
Qty: 360 TABLET | Refills: 1 | Status: SHIPPED | OUTPATIENT
Start: 2025-07-28

## 2025-07-28 NOTE — TELEPHONE ENCOUNTER
Endocrine Refill protocol for metformin  Per LOV on 6/3/25:  \"-continue with Metformin XR 2,000mg daily with breakfast\"  RX sent.  Protocol Criteria:  PASSED      If all below requirements are met, send a 90-day supply with 1 refill per provider protocol.     Verify appointment with Endocrinology completed in the last 6 months or scheduled in the next 3 months.  Verify A1C has been completed within the last 6 months and is below 8.5%  Verify last GFR is greater than or equal to 40 in the past 12 months    Last completed office visit:6/3/2025 Sorin Persaud APRN   Next scheduled Follow up:   Future Appointments   Date Time Provider   10/14/2025  4:00 PM Sorin Persaud APRN   12/16/2025  4:40 PM [facility]       Last GFR result:    Lab Results   Component Value Date    EGFRCR 107 06/30/2025     Last A1c result: Last A1C result: 7.5% done 6/3/2025.

## 2025-08-05 ENCOUNTER — PATIENT MESSAGE (OUTPATIENT)
Dept: ENDOCRINOLOGY CLINIC | Facility: CLINIC | Age: 50
End: 2025-08-05

## (undated) DEVICE — SNAP KOVER: Brand: UNBRANDED

## (undated) DEVICE — SNARE ENDOSCOPIC 10MM ROUND

## (undated) DEVICE — HYDROGEL COATED URETERAL DILATOR: Brand: NOTTINGHAM ONE-STEP

## (undated) DEVICE — SOLUTION IRRIG 1000ML 0.9% NACL USP BTL

## (undated) DEVICE — NITINOL WIRE WITH HYDROPHILIC TIP: Brand: SENSOR

## (undated) DEVICE — BIPOLAR FORCEPS CORD,BANANA LEADS: Brand: VALLEYLAB

## (undated) DEVICE — Device

## (undated) DEVICE — TRAP 4 CPTR CHMBR N EZ INLN

## (undated) DEVICE — FIBER LSR 200UM 2J 80HZ 60W DL FOR LITHO

## (undated) DEVICE — GUIDEWIRE ENDOSCP L150CM DIA0.035IN TIP 3CM

## (undated) DEVICE — DEVICE SPEC RTRVL MYOSURE

## (undated) DEVICE — GAMMEX® PI HYBRID SIZE 8, STERILE POWDER-FREE SURGICAL GLOVE, POLYISOPRENE AND NEOPRENE BLEND: Brand: GAMMEX

## (undated) DEVICE — FLEXOR, URETERAL ACCESS SHEATH WITH AQ, HYDROPHILIC COATING: Brand: FLEXOR

## (undated) DEVICE — SOL  .9 3000ML

## (undated) DEVICE — MEDI-VAC NON-CONDUCTIVE SUCTION TUBING 6MM X 1.8M (6FT.) L: Brand: CARDINAL HEALTH

## (undated) DEVICE — URETERAL ACCESS SHEATH SET: Brand: NAVIGATOR HD

## (undated) DEVICE — 1010 S-DRAPE TOWEL DRAPE 10/BX: Brand: STERI-DRAPE™

## (undated) DEVICE — STANDARD HYPODERMIC NEEDLE,POLYPROPYLENE HUB: Brand: MONOJECT

## (undated) DEVICE — OCCLUSIVE GAUZE STRIP,3% BISMUTH TRIBROMOPHENATE IN PETROLATUM BLEND: Brand: XEROFORM

## (undated) DEVICE — CATHETER URET 5FRX28IN CONE TIP POLYUR DISP

## (undated) DEVICE — 3M™ STERI-STRIP™ REINFORCED ADHESIVE SKIN CLOSURES, R1547, 1/2 IN X 4 IN (12 MM X 100 MM), 6 STRIPS/ENVELOPE: Brand: 3M™ STERI-STRIP™

## (undated) DEVICE — STERILE LATEX POWDER-FREE SURGICAL GLOVESWITH NITRILE COATING: Brand: PROTEXIS

## (undated) DEVICE — ENDOSCOPIC VALVE WITH ADAPTER.: Brand: SURSEAL® II

## (undated) DEVICE — UROLOGY DRAIN BAG

## (undated) DEVICE — JELLY,LUBE,STERILE,FLIP TOP,TUBE,2-OZ: Brand: MEDLINE

## (undated) DEVICE — KIT CLEAN ENDOKIT 1.1OZ GOWNX2

## (undated) DEVICE — STERILE TETRA-FLEX CF, ELASTIC BANDAGE, 2" X 5.5YD: Brand: TETRA-FLEX™CF

## (undated) DEVICE — SLEEVE COMPR MD KNEE LEN SGL USE KENDALL SCD

## (undated) DEVICE — SOLUTION IRRIG 3000ML 0.9% NACL FLX CONT

## (undated) DEVICE — DECANTER SPIKE TRANSFLOW

## (undated) DEVICE — SUCTION CANISTER, 3000CC,SAFELINER: Brand: DEROYAL

## (undated) DEVICE — UPPER EXTREMITY: Brand: MEDLINE INDUSTRIES, INC.

## (undated) DEVICE — SYRINGE MNJCT 10ML LF STRL REG

## (undated) DEVICE — HYSTEROSCOPY: Brand: MEDLINE INDUSTRIES, INC.

## (undated) DEVICE — MYOSURE SINGLE USE SEAL SET

## (undated) DEVICE — STRETCH BANDAGE: Brand: CURITY

## (undated) DEVICE — SOCK CNSTR 4IN TNPSL UNV SPEC

## (undated) DEVICE — PADDING CAST WYTEX 2\" STER

## (undated) DEVICE — SOL  .9 1000ML BTL

## (undated) DEVICE — SUTURE PROLENE 4-0 PS-2

## (undated) DEVICE — SOLUTION IRRIG 1000ML ST H2O AQUALITE PLAS

## (undated) DEVICE — HOVERMATT 34IN SINGLE USE

## (undated) DEVICE — HEXAGONAL CAPTIVATOR STIFF 13M

## (undated) DEVICE — SET TUBI Y FL CNTRL INFL/OTFL

## (undated) DEVICE — GOWN SURG AERO BLUE PERF LG

## (undated) DEVICE — CATHETER URET 5FR L70CM FLX OPN TIP NONPORTED

## (undated) DEVICE — REM POLYHESIVE ADULT PATIENT RETURN ELECTRODE: Brand: VALLEYLAB

## (undated) DEVICE — GAMMEX® PI HYBRID SIZE 6, STERILE POWDER-FREE SURGICAL GLOVE, POLYISOPRENE AND NEOPRENE BLEND: Brand: GAMMEX

## (undated) DEVICE — KIT ENDO ORCAPOD 160/180/190

## (undated) DEVICE — SYSTEM TRNSF 39X49IN MOB AIR HALFMATS

## (undated) DEVICE — 35 ML SYRINGE REGULAR TIP: Brand: MONOJECT

## (undated) DEVICE — STIRRUP STRAP W/SLING RING

## (undated) DEVICE — ADHESIVE LIQ 2/3ML VI MASTISOL

## (undated) DEVICE — 3M™ TEGADERM™ HP TRANSPARENT FILM DRESSING FRAME STYLE, 9534HP, 2-3/8 X 2-3/4 IN (6 CM X 7 CM), 100/CT 4CT/CASE: Brand: 3M™ TEGADERM™

## (undated) DEVICE — LINE MNTR ADLT SET O2 INTMD

## (undated) DEVICE — NITINOL STONE RETRIEVAL BASKET: Brand: ZERO TIP

## (undated) DEVICE — OSTEOTOME INTM SGL SRG CTRS

## (undated) DEVICE — CHLORAPREP 26ML APPLICATOR

## (undated) DEVICE — ESMARK: Brand: DEROYAL

## (undated) DEVICE — DUAL LUMEN URETERAL CATHETER

## (undated) DEVICE — CUFF TRNQT CYL 24X4IN 2 PORT 2

## (undated) DEVICE — TIGERTAIL 6F FLXTIP 70CM

## (undated) DEVICE — BANDAGE ROLL,100% COTTON, 6 PLY, SMALL: Brand: KERLIX

## (undated) DEVICE — PACK CUSTOM CYSTO

## (undated) DEVICE — COVER SGL STRL LGHT HNDL BLU

## (undated) DEVICE — V2 SPECIMEN COLLECTION MANIFOLD KIT: Brand: NEPTUNE

## (undated) DEVICE — 60 ML SYRINGE REGULAR TIP: Brand: MONOJECT

## (undated) NOTE — MR AVS SNAPSHOT
Jimi Nickerson [address]  [phone]  [phone]               Thank you for choosing us for your health care visit with Mario Crawford PTA.   We are glad to serve you and happy to provide you with Summaries. If you've been to the Emergency Department or your doctor's office, you can view your past visit information in [employer] by going to Visits < Visit Summaries. [employer] questions? Call [phone] for help.   [employer] is NOT to be used for urge

## (undated) NOTE — MR AVS SNAPSHOT
Jimi Nickerson [address] [facility] Perico Velazquez  [phone]  [phone]               Thank you for choosing us for your health care visit with Olga Lidia Sams PTA.   We are glad to serve you and happy to provide you with Do NOT use deodorant, talcum powder, lotions, or creams on your breasts or underarms.  They leave a coating that may be picked up by the x-rays, thereby distorting the mammogram.  Wear a two piece outfit the day of the exam. This allows you to be more comf

## (undated) NOTE — LETTER
4/9/2019              [address] [place] [id number]         Dear Misha Melo records indicate that the FASTING labs ordered for you by Gentry Mahoney MD  have not been done.   If you have, in fact, already compl

## (undated) NOTE — LETTER
[facility]  [address], [place], [place]    Authorization for Surgical Operation and Procedure                               I hereby authorize Bobby Ann MD, my physician and his/her assistants (if applicable), which may include medical students, residents, and/or fellows, to perform the following surgical operation/ procedure and administer such anesthesia as may be determined necessary by my physician: Operation/Procedure name (s) COLONOSCOPY on Kay Tracy   2.   I recognize that during the surgical operation/procedure, unforeseen conditions may necessitate additional or different procedures than those listed above.  I, therefore, further authorize and request that the above-named surgeon, assistants, or designees perform such procedures as are, in their judgment, necessary and desirable.    3.   My surgeon/physician has discussed prior to my surgery the potential benefits, risks and side effects of this procedure; the likelihood of achieving goals; and potential problems that might occur during recuperation.  They also discussed reasonable alternatives to the procedure, including risks, benefits, and side effects related to the alternatives and risks related to not receiving this procedure.  I have had all my questions answered and I acknowledge that no guarantee has been made as to the result that may be obtained.    4.   Should the need arise during my operation/procedure, which includes change of level of care prior to discharge, I also consent to the administration of blood and/or blood products.  Further, I understand that despite careful testing and screening of blood or blood products by collecting agencies, I may still be subject to ill effects as a result of receiving a blood transfusion and/or blood products.  The following are some, but not all, of the potential risks that can occur: fever and allergic reactions, hemolytic reactions, transmission of  diseases such as Hepatitis, AIDS and Cytomegalovirus (CMV) and fluid overload.  In the event that I wish to have an autologous transfusion of my own blood, or a directed donor transfusion, I will discuss this with my physician.  Check only if Refusing Blood or Blood Products  I understand refusal of blood or blood products as deemed necessary by my physician may have serious consequences to my condition to include possible death. I hereby assume responsibility for my refusal and release the hospital, its personnel, and my physicians from any responsibility for the consequences of my refusal.    o  Refuse   5.   I authorize the use of any specimen, organs, tissues, body parts or foreign objects that may be removed from my body during the operation/procedure for diagnosis, research or teaching purposes and their subsequent disposal by hospital authorities.  I also authorize the release of specimen test results and/or written reports to my treating physician on the hospital medical staff or other referring or consulting physicians involved in my care, at the discretion of the Pathologist or my treating physician.    6.   I consent to the photographing or videotaping of the operations or procedures to be performed, including appropriate portions of my body for medical, scientific, or educational purposes, provided my identity is not revealed by the pictures or by descriptive texts accompanying them.  If the procedure has been photographed/videotaped, the surgeon will obtain the original picture, image, videotape or CD.  The hospital will not be responsible for storage, release or maintenance of the picture, image, tape or CD.    7.   I consent to the presence of a  or observers in the operating room as deemed necessary by my physician or their designees.    8.   I recognize that in the event my procedure results in extended X-Ray/fluoroscopy time, I may develop a skin reaction.    9. If I have a Do Not  Attempt Resuscitation (DNAR) order in place, that status will be suspended while in the operating room, procedural suite, and during the recovery period unless otherwise explicitly stated by me (or a person authorized to consent on my behalf). The surgeon or my attending physician will determine when the applicable recovery period ends for purposes of reinstating the DNAR order.  10. Patients having a sterilization procedure: I understand that if the procedure is successful the results will be permanent and it will therefore be impossible for me to inseminate, conceive, or bear children.  I also understand that the procedure is intended to result in sterility, although the result has not been guaranteed.   11. I acknowledge that my physician has explained sedation/analgesia administration to me including the risk and benefits I consent to the administration of sedation/analgesia as may be necessary or desirable in the judgment of my physician.    I CERTIFY THAT I HAVE READ AND FULLY UNDERSTAND THE ABOVE CONSENT TO OPERATION and/or OTHER PROCEDURE.     ____________________________________  _________________________________        ______________________________  Signature of Patient    Signature of Responsible Person                Printed Name of Responsible Person                                      ____________________________________  _____________________________                ________________________________  Signature of Witness        Date  Time         Relationship to Patient    STATEMENT OF PHYSICIAN My signature below affirms that prior to the time of the procedure; I have explained to the patient and/or his/her legal representative, the risks and benefits involved in the proposed treatment and any reasonable alternative to the proposed treatment. I have also explained the risks and benefits involved in refusal of the proposed treatment and alternatives to the proposed treatment and have answered the  patient's questions. If I have a significant financial interest in a co-management agreement or a significant financial interest in any product or implant, or other significant relationship used in this procedure/surgery, I have disclosed this and had a discussion with my patient.     _____________________________________________________              _____________________________  (Signature of Physician)                                                                                         (Date)                                   (Time)  Patient Name: Kay Harris Demarcus      : 1975      Printed: 2025     Medical Record #: [id number]                                      Page 1 of 1

## (undated) NOTE — MR AVS SNAPSHOT
Jimi Nickerson [address] [place] [address] [facility]  [phone]  [phone]               Thank you for choosing us for your health care visit with [facility] KERI robert PT.   We are glad to serve you and happy to provide you with [place] [address]   [phone]           Please arrive at your scheduled appointment time. Wear comfortable, loose fitting clothing.             Jan 30, 2017  3:45 PM   [place] [facility] Visit By Therapist with Mario Crawford PTA   [place]  [facility] If you've recently had a stay at the Hospital you can access your discharge instructions in [employer] by going to Visits < Admission Summaries.  If you've been to the Emergency Department or your doctor's office, you can view your past visit information in My

## (undated) NOTE — MR AVS SNAPSHOT
Jimi Nickerson [address]  [phone]  [phone]               Thank you for choosing us [facility]   We are glad to serve you and happy to provide you with [place] [facility] Visit By Therapist with Javier Chanel, [address] [facility] ([address] [facility])    [address]   [phone]           Please arrive at your scheduled appointment

## (undated) NOTE — Clinical Note
Transitional Care Management call compelted. A Transitional Care Management appointment is scheduled for 8/6/2024. Nurse care manager contacted the hospitalists to confirm medications at discharge. Thank you.

## (undated) NOTE — MR AVS SNAPSHOT
Adina  [address]  [phone]               Thank you for choosing us for your health care visit with Lauren Samaniego MD.  We are glad to serve you and happy to provide you with this hand [address]  [employer]   [phone]           Please arrive at your scheduled appointment time. Wear comfortable, loose fitting clothing.             Mar 09, 2017  3:45 PM   [place] [facility] Visit By Therapist with Bettina Lord [email or web address]. If you've recently had a stay at the Hospital you can access your discharge instructions in [employer] by going to Visits < Admission Summaries.  If you've been to the Emergency Department or your doctor's office, you can view yo

## (undated) NOTE — MR AVS SNAPSHOT
Jimi Nickerson [address]  [phone]  [phone]               Thank you for choosing us [facility]   We are glad to serve you and happy to provide you with

## (undated) NOTE — LETTER
July 29, 2020         [place], [address] [facility]  [address]  [address]      Patient: Anila Ba   YOB: 1975   Date of Visit: 7/29/2020       Dear Dr. Bakari De Paz MD,    I saw your patient, Anila Ba, on 7/2

## (undated) NOTE — MR AVS SNAPSHOT
Jimi Nickerson [address]  [phone]  [phone]               Thank you for choosing us for [facility]   We are glad to serve you and happy to provide you with For medical emergencies, dial 911.

## (undated) NOTE — LETTER
9/22/2020              [address] [place] [address]         Dear Grzegorz Maxwell,    [facility] records indicate that the tests ordered for you by JUVENCIO Titus  have not been done.   If you have, in fact, already completed t

## (undated) NOTE — LETTER
AUTHORIZATION FOR SURGICAL OPERATION OR OTHER PROCEDURE    1. I hereby authorize Dr. SAUER, and [facility] staff assigned to my case to perform the following operation and/or procedure at the [facility]:    IUD EXCHANGE    2.  My physician has explained the nature and purpose of the operation or other procedure, possible alternative methods of treatment, the risks involved, and the possibility of complication to me.  I acknowledge that no guarantee has been made as to the result that may be obtained.  3.  I recognize that, during the course of this operation, or other procedure, unforseen conditions may necessitate additional or different procedure than those listed above.  I, therefore, further authorize and request that the above named physician, his/her physician assistants or designees perform such procedures as are, in his/her professional opinion, necessary and desirable.  4.  Any tissue or organs removed in the operation or other procedure may be disposed of by and at the discretion of the [facility] and [facility].  5.  I understand that in the event of a medical emergency, I will be transported by local paramedics to [facility] or other hospital emergency department.  6.  I certify that I have read and fully understand the above consent to operation and/or other procedure.    7.  I acknowledge that my physician has explained sedation/analgesia administration to me including the risks and benefits.  I consent to the administration of sedation/analgesia as may be necessary or desirable in the judgement of my physician.    Witness signature: ___________________________________________________ Date:  ______/______/_____                    Time:  ________ A.M.  P.M.       Patient Name:  ______________________________________________________  (please print)      Patient signature:  ___________________________________________________             Relationship to  Patient:           []  Parent    Responsible person                          []  Spouse  In case of minor or                    [] Other  _____________   Incompetent name:  __________________________________________________                               (please print)      _____________      Responsible person  In case of minor or  Incompetent signature:  _______________________________________________    Statement of Physician  My signature below affirms that prior to the time of the procedure, I have explained to the patient and/or his/her guardian, the risks and benefits involved in the proposed treatment and any reasonable alternative to the proposed treatment.  I have also explained the risks and benefits involved in the refusal of the proposed treatment and have answered the patient's questions.                        Date:  ______/______/_______  Provider                      Signature:  __________________________________________________________       Time:  ___________ A.M    P.M.

## (undated) NOTE — LETTER
10/07/19        [address]      Dear Ja Daily,    [facility] [facility] records indicate that you have outstanding lab work and or testing that was ordered for you and has not yet been completed:  Orders Placed This Encounter

## (undated) NOTE — LETTER
09/04/20        [address]      Dear Mee Winslow,    [address] [facility] records indicate that you have outstanding lab work and or testing that was ordered for you and has not yet been completed:  Orders Placed This Encounter

## (undated) NOTE — MR AVS SNAPSHOT
Jimi Ncikerson [address] 2000 [facility] [address] [facility]  [phone]  [phone]               Thank you for choosing us for your health care visit [facility]   We are glad to serve you and happy to provide you with exam.  To ENSURE YOUR SAFETY: If you have a medical implant device it is extremely important to present documentation of the implant information at time of your appointment.   FOR FEMALES HAVING GADOLINIUM EXAMS :If you are breastfeeding and your exam requi office, you can view your past visit information in [employer] by going to Visits < Visit Summaries. [employer] questions? Call [phone] for help. [employer] is NOT to be used for urgent needs. For medical emergencies, dial 911.

## (undated) NOTE — MR AVS SNAPSHOT
Jimi Nickerson [address]  [phone]  [phone]               Thank you for choosing us for your health [facility] visit [facility].   We are glad to serve you and happy to provide you with [place] [facility] Visit By Therapist with Ekta Mancini, [address] [facility] ([facility])    [address]   [phone]           Please arrive at your scheduled appointment

## (undated) NOTE — LETTER
To Whom It May Concern:    Kay Tracy will have a medical procedure on 8/20/2024 with Dr. Higgins. Please excuse her from work for this day                          Please feel free to contact us if there are any questions.      Sincerely,      Raquel Higgins MD  [address]  [facility] [address]  [phone]

## (undated) NOTE — MR AVS SNAPSHOT
Jimi Nickerson [address] 2000 [facility] [address]  [phone]  [phone]               Thank you for choosing us for your health care visit with Mal Cabrera PTA.   We are glad to serve you and happy to provide you with [facility] [address]   [phone]           Please arrive at your scheduled appointment time. Wear comfortable, loose fitting clothing.             Feb 01, 2017  7:40 PM   [facility] DIGITAL SCREENING W/CAD with [facility] OF THE [address] Commonly known as:  Prabhakar can access your MyChart to more actively manage your health care and view more details from this visit by going to [email or web address].   If you've recently had a st

## (undated) NOTE — LETTER
1/28/2019          To Whom It May Concern:    Guera Conroy is currently under my medical care and may  return to work with no restrictions as of Tuesday 1/29/2019. If you require additional information please contact our office.         Sincerely,

## (undated) NOTE — LETTER
2023      No Recipients     Patient: Cristina Tee   YOB: 1975   Date of Visit: 2023       Dear Dr. Willa Valenzuela Recipients: Thank you for referring Karl Hugo to me for evaluation. Here is my assessment and plan of care:    Cristina Tee is a 56year old female. HPI:     HPI    NP. Pt in today for a complete eye exam. Pt's last eye exam was about 7 months ago at [facility] and was prescribed glasses. Pt complains of blurry vision/ film in the center of the right eye that has been present for almost 2 weeks. Pt denies any surgeries done to the eyes, pain or recent trauma to the head or eyes. Pt has been a diabetic for 12 years       Pt's diabetes is currently controlled by pills,insulin & injectable.    Pt checks BS once a day  Pt's last blood sugar was 110 yesterday   Last HA1C was 6.3 on 22  Endocrinologist: Dr. Brigette Goldberg      Consult: per Dr. Mecca Marr  Last edited by Ava Runner, OT on 2023  8:24 AM.        Patient History:  Past Medical History:   Diagnosis Date    Allergic rhinitis     Asthma     allergy induced    Carpal tunnel syndrome     Diabetes ([facility] [address]     diabetes for     GDM (gestational diabetes mellitus)     High blood pressure     High cholesterol 2014    HORMONE DISORDER     PCOS    Infertility female     Obesity     Other and unspecified hyperlipidemia 2014    PCOS (polycystic ovarian syndrome)     Seasonal allergies     Shoulder tendonitis     Type II or unspecified type diabetes mellitus without mention of complication, uncontrolled 2014       Surgical History: Cristina Tee has a past surgical history that includes tonsillectomy (); adenoidectomy (); reduction of large breast (); other () (carpal tunnel release); other surgical history (IVF);  (2078,9938); laparoscopic appendectomy (2/27/15); appendectomy (2015); carpal tunnel release (Right); other (d&c); reduction left (); reduction right (1998); and colonoscopy (N/A, 2/15/2022) (Procedure: COLONOSCOPY;  Surgeon: Divya Gilbert MD;  Location: [facility]). Family History   Problem Relation Age of Onset    Diabetes Neg     Glaucoma Neg     Macular degeneration Neg        Social History:   Social History     Socioeconomic History    Marital status:     Number of children: 1   Occupational History    Occupation: speech therapist   Tobacco Use    Smoking status: Never    Smokeless tobacco: Never   Vaping Use    Vaping status: Never Used   Substance and Sexual Activity    Alcohol use: No     Alcohol/week: 0.0 standard drinks of alcohol    Drug use: No   Other Topics Concern    Caffeine Concern Yes     Comment: soda-1 cup/day    Reaction to local anesthetic No    Pt has a pacemaker No    Pt has a defibrillator No       Medications:  Current Outpatient Medications   Medication Sig Dispense Refill    atorvastatin 40 MG Oral Tab TAKE 1 TABLET BY MOUTH EVERY DAY AT NIGHT 90 tablet 3    metFORMIN  MG Oral Tablet 24 Hr TAKE FOUR TABLETS BY MOUTH DAILY WITH BREAKFAST. 360 tablet 3    lisinopril 5 MG Oral Tab Take 1 tablet (5 mg total) by mouth daily. 90 tablet 3    FARXIGA 10 MG Oral Tab TAKE 1 TABLET BY MOUTH EVERY DAY 90 tablet 1    HUMULIN R U-500 KWIKPEN 500 UNIT/ML Subcutaneous Solution Pen-injector INJECT 0.24 ML (120 UNITS TOTAL) INTO THE SKIN DAILY WITH BREAKFAST AND 0.24 ML (120 UNITS TOTAL) DAILY WITH LUNCH AND 0.25 ML (125 UNITS TOTAL) DAILY WITH DINNER. 24 mL 2    TRAZODONE 50 MG Oral Tab TAKE 1 TABLET BY MOUTH NIGHTLY AS NEEDED FOR SLEEP 90 tablet 1    OZEMPIC, 0.25 OR 0.5 MG/DOSE, 2 MG/1.5ML Subcutaneous Solution Pen-injector INJECT 0.5 MG INTO THE SKIN ONCE A WEEK. 4.5 mL 1    Insulin Pen Needle (CAREFINE PEN NEEDLES) 32G X 4 MM Does not apply Misc Inject three times a day. 300 each 0    levonorgestrel 20 MCG/24HR Intrauterine IUD Mirena 20 mcg/24 hr (5 years) intrauterine device   Take by intrauterine route. Glucose Blood In Vitro Strip Check sugars four times a day. 300 each 0    Insulin Syringe-Needle U-100 31G X 5/16\" 0.5 ML Does not apply Misc Inject three times daily 100 each 0    Blood Glucose Monitoring Suppl (Coraid CONTOUR MONITOR) W/DEVICE Does not apply Kit by Does not apply route.          Allergies:    Cat Dander [Dander]       Iodine (Topical)          Seasonal                  Nickel                  RASH  No Known Allergies      ITCHING    ROS:     ROS    Positive for: Eyes  Negative for: Constitutional, Gastrointestinal, Neurological, Skin, Genitourinary, Musculoskeletal, HENT, Endocrine, Cardiovascular, Respiratory, Psychiatric, Allergic/Imm, Heme/Lymph  Last edited by Kati Gonzalez OT on 4/25/2023  8:06 AM.          PHYSICAL EXAM:     Base Eye Exam       Visual Acuity (Snellen - Linear)         Right Left    Dist cc 20/30 +2 20/20 -1    Dist ph cc NI     Near cc 20/30+ 20/20      Correction: Glasses              Tonometry (Icare, 8:19 AM)         Right Left    Pressure 20 20              Pupils         Pupils    Right PERRL    Left PERRL              Visual Fields         Left Right     Full Full              Extraocular Movement         Right Left     Full, Ortho Full, Ortho              Dilation       Both eyes: 1.0% Mydriacyl and 2.5% Brock Synephrine @ 8:19 AM                  Additional Tests       Amsler         Right Left     Normal Normal                  Slit Lamp and Fundus Exam       Slit Lamp Exam         Right Left    Lids/Lashes Dermatochalasis, Meibomian gland dysfunction Dermatochalasis, Meibomian gland dysfunction    Conjunctiva/Sclera Temp pinguecula Temp pinguecula    Cornea Clear Clear    Anterior Chamber Deep and quiet Deep and quiet    Iris Normal Normal    Lens Trace Nuclear sclerosis, 2-3+ Posterior subcapsular cataract Trace Nuclear sclerosis, Trace Cortical cataract    Vitreous Clear Clear              Fundus Exam         Right Left    Disc Sloping margin, Temporal crescent Sloping margin, Temporal crescent    C/D Ratio 0.4 blurry view 0.4    Macula Normal, no BDR Normal, no BDR    Vessels Normal Normal    Periphery Normal Normal                  Refraction       Wearing Rx         Sphere Cylinder Axis Add    Right -3.75 +0.75 170 +1.75    Left -4.25 +0.50 060 +1.75      Age: 1yr    Type: Progressive bifocal              Manifest Refraction         Sphere Cylinder [place] [facility] [place] Add [place]    Right -3.75 +1.00 170 20/30- +1.75 20/20-    Left -4.25 +0.50 060 20/20 +1.75 20/20              Final Rx         Sphere Cylinder [place] [facility] Add [place]    Right -3.75 +1.00 170 20/30- +1.75 20/20-    Left -4.25 +0.50 060 20/20 +1.75 20/20      Type: Progressive bifocal                     ASSESSMENT/PLAN:     Diagnoses and Plan:     Posterior subcapsular age-related cataract of right eye  Discussed with patient that cataract in the right eye is advanced enough at this time to consider surgery; it would be patient's choice. Discussed options such as surgery or change of glasses RX. Discussed surgical risks, benefits, alternatives and recovery. Patient understands that changing glasses will not significantly improve vision and elects to have surgery. As Dr. Denny Michael is no longer performing cataract surgery, a referral to [facility] was offered. Patient agrees to be referred, so we will send complete exam and information to them and the patient will be contacted. Information on [address] [place] [address] ophthalmology given and reviewed with the patient. Diabetes mellitus type 2 without retinopathy ([address]  Diabetes type II: mild background diabetic retinopathy, no signs of neovascularization noted. No treatment necessary at this time. Patient was instructed to monitor vision for sudden changes and to call if visual changes noted. Discussed ocular and systemic benefits of blood sugar control. Age-related nuclear cataract, left  Discussed early cataracts with patient.  Told patient that cataracts are age appropriate and they are not surgical at this time. No treatment recommended at this time. No orders of the defined types were placed in this encounter. Meds This Visit:  Requested Prescriptions      No prescriptions requested or ordered in this encounter        Follow up instructions:  Return in about 1 year (around 4/25/2024) for Diabetic Eye Exam.    4/25/2023  Scribed by: Sela Duverney, MD      If you have questions, please do not hesitate to call me. I look forward to following Rosa Ced along with you.     Sincerely,        Sela Duverney, MD        CC:   No Recipients    Document electronically generated by: Sela Duverney, MD

## (undated) NOTE — LETTER
Patient Name: Ace Houston  YOB: 1975          MRN :  [id number]  Date:  7/26/2021  Referring Physician:  Celso Oliver    Discharge Summary  Pt has attended 8 visits in Physical Therapy.      Dx: Acute pain of left shoulder (M25.512

## (undated) NOTE — MR AVS SNAPSHOT
Jimi Nickerson [address]  [phone]  [phone]               Thank you for choosing us for your health care visit [facility].   We are glad to serve you and happy to provide you with For medical emergencies, dial 911.

## (undated) NOTE — IP AVS SNAPSHOT
[facility]    [address], [address], [place] ~ [phone]                Discharge Summary   4/13/2017    Frankie Trejo           Admission Information        Provider Department    4/13/2017 Michelle Cash MD [facility] Pr · That you have someone stay with you on your first night home   · Do not drink alcohol or take sleeping pills or tranquilizers   · Do not sign legal documents within 24 hours of your procedure   · If you had a nerve block for your surgery, take extra care Questionnaires will be mailed to randomly selected surgery patients 30 days after their surgery.       If you receive a questionnaire about your surgery, please take a few minutes to answer the questions and return in the provided self-addressed stamped env coverage. Patient [address] is a Federal Navigator program that can help with your Affordable Care Act coverage, as well as all types of Medicaid plans.   To get signed up and covered, please call [phone] and ask to get set up for an insuran

## (undated) NOTE — MR AVS SNAPSHOT
Jimi Nickerson [address]  [phone]  [phone]               Thank you for choosing us for your health care visit with Howard robert PT.   We are glad to serve you and happy to provide you with [place] [facility] Visit By Therapist with David Campos, [address] [facility] ([address] [facility])    [address]   [phone]           Please arrive at your scheduled appointment

## (undated) NOTE — MR AVS SNAPSHOT
[address]  [address] [facility]  [phone]               Thank you for choosing us for your health care visit with Alina Parry DO.   We are glad to serve you and happy to provide you with this summary [place] [facility] Therapy Visit By Therapist with Magdalena Donato, [address] [facility] ([facility])    [address]   [phone]           Please arrive at your scheduled appointment MetFORMIN HCl  MG Tb24   TAKE FOUR TABLETS BY MOUTH DAILY WITH BREAKFAST.    Commonly known as:  [place] [facility] - Dr Agustin Alvarez    Complete by:  As directed    Assoc Dx:  Chronic left shoulder pain [M25.512, physician's office. At that time, you will be provided with any authorization numbers or be assured that none are required. You can then schedule your appointment.  Failure to obtain required authorization numbers can create reimbursement difficulties for y

## (undated) NOTE — MR AVS SNAPSHOT
[address]  [phone]               Thank you for choosing us for your health care visit with Bradly Soto MD.  We are glad to serve you and happy to provide you with this summary of your visit. INJECT 10 UNITS WITH BREAKFAST AND LUNCH (50 UNITS OF U-100) & 13 UNITS WITH DINNER (65 OF U-100)   What changed:  See the new instructions.            Insulin Syringe-Needle U-100 31G X 5/16\" 0.5 ML Misc   Inject three times daily           lisinopril 5 M

## (undated) NOTE — LETTER
AUTHORIZATION FOR SURGICAL OPERATION OR OTHER PROCEDURE    1.  I hereby authorize Dr. Arelis Gaviria, and [facility] staff assigned to my case to perform the following operation and/or procedure at the [facility]:    ____________reduction of 5th toe Time:  ________ A. M.  P.M.        Patient Name:  ______________________________________________________  (please print)      Patient signature:  ___________________________________________________             Relationship to Patient:

## (undated) NOTE — MR AVS SNAPSHOT
Jimi Nickerson [address]  [phone]  [phone]               Thank you for choosing us for your [facility]   We are glad to serve you and happy to provide you with Please arrive at your scheduled appointment time. Wear comfortable, loose fitting clothing.               MyChart     Visit MyChart  You can access your MyChart to more actively manage your health care and view more details from this visit by going to htt

## (undated) NOTE — MR AVS SNAPSHOT
[address]  [address] [facility]  [phone]               Thank you for choosing us for your health care visit with Fiorella Montana MD.  We are glad to serve you and happy to provide you with this summa [address] [facility] ([address] [place] [address])    [address] [address]   [phone]           Please arrive at your scheduled appointment time. Wear comfortable, loose fitting clothing.             Jan 17, 201 Commonly known as:  LIPITOR           DARON CONTOUR MONITOR W/DEVICE Kit   by Does not apply route. Glucose Blood Strp   Check sugars four times a day.    Commonly known as:  DARON CONTOUR NEXT TEST           HUMULIN R U-500 (CONCENTRATED) 500 UNI Visit [employer]  You can access your MyChart to more actively manage your health care and view more details from this visit by going to [email or web address].   If you've recently had a stay at the Hospital you can access your discharge instructions i

## (undated) NOTE — LETTER
5/27/2021              [facility]        [address]         Dear Jenny,      The report of the Biopsy done on 5/19/21 shows a epidermoid cyst.  This is a benign (not cancerous) growth, and requires no further judith

## (undated) NOTE — LETTER
[facility]  Administration of Anesthesia  I, Kay Tracy agree to be cared for by a physician anesthesiologist alone and/or with a nurse anesthetist, who is specially trained to monitor me and give me medicine to put me to sleep or keep me comfortable during my procedure    I understand that my anesthesiologist and/or anesthetist is not an employee or agent of [facility] or "[employer] Services. He or she works for [facility]    As the patient asking for anesthesia services, I agree to:  Allow the anesthesiologist (anesthesia doctor) to give me medicine and do additional procedures as necessary. Some examples are: Starting or using an “IV” to give me medicine, fluids or blood during my procedure, and having a breathing tube placed to help me breathe when I’m asleep (intubation). In the event that my heart stops working properly, I understand that my anesthesiologist will make every effort to sustain my life, unless otherwise directed by [facility] Do Not Resuscitate documents.  Tell my anesthesia doctor before my procedure:  If I am pregnant.  The last time that I ate or drank.  iii. All of the medicines I take (including prescriptions, herbal supplements, and pills I can buy without a prescription (including street drugs/illegal medications). Failure to inform my anesthesiologist about these medicines may increase my risk of anesthetic complications.  iv.If I am allergic to anything or have had a reaction to anesthesia before.  I understand how the anesthesia medicine will help me (benefits).  I understand that with any type of anesthesia medicine there are risks:  The most common risks are: nausea, vomiting, sore throat, muscle soreness, damage to my eyes, mouth, or teeth (from breathing tube placement).  Rare risks include: remembering what happened during my procedure, allergic reactions to medications, injury to my airway, heart, lungs, vision, nerves,  or muscles and in extremely rare instances death.  My doctor has explained to me other choices available to me for my care (alternatives).  Pregnant Patients (“epidural”):  I understand that the risks of having an epidural (medicine given into my back to help control pain during labor), include itching, low blood pressure, difficulty urinating, headache or slowing of the baby’s heart. Very rare risks include infection, bleeding, seizure, irregular heart rhythms and nerve injury.  Regional Anesthesia (“spinal”, “epidural”, & “nerve blocks”):  I understand that rare but potential complications include headache, bleeding, infection, seizure, irregular heart rhythms, and nerve injury.    _____________________________________________________________________________  Patient (or Representative) Signature/Relationship to Patient  Date   Time    _____________________________________________________________________________   Name (if used)    Language/Organization   Time    _____________________________________________________________________________  Nurse Anesthetist Signature     Date   Time  _____________________________________________________________________________  Anesthesiologist Signature     Date   Time  I have discussed the procedure and information above with the patient (or patient’s representative) and answered their questions. The patient or their representative has agreed to have anesthesia services.    _____________________________________________________________________________  Witness        Date   Time  I have verified that the signature is that of the patient or patient’s representative, and that it was signed before the procedure  Patient Name: Kay Harris Demarcus     : 1975                 Printed: 2025 at 9:28 AM    Medical Record #: [id number]                                            Page 1 of 1  ----------ANESTHESIA CONSENT----------

## (undated) NOTE — LETTER
Date & Time: 9/17/2023, 8:27 PM  Patient: Mary Gregory  Encounter Provider(s):    Boone Escalona MD       To Whom It May Concern:    Rubi Abdalla was seen and treated in our department on 9/17/2023. She can return to work 9/19/23.     If you have any questions or concerns, please do not hesitate to call.        _____________________________  Physician/APC Signature

## (undated) NOTE — MR AVS SNAPSHOT
Jimi Nickerson [address]  [phone]  [phone]               Thank you for choosing us for your health care visit [facility]   We are glad to serve you and happy to provide you with Please arrive at your scheduled appointment time. Wear comfortable, loose fitting clothing.               MyChart     Visit MyChart  You can access your MyChart to more actively manage your health care and view more details from this visit by going to htt

## (undated) NOTE — MR AVS SNAPSHOT
Jimi Nickerson [address]  [phone]  [phone]               Thank you for choosing us for [facility]   We are glad to serve you and happy to provide you with

## (undated) NOTE — MR AVS SNAPSHOT
Jimi Nickerson [address]  [phone]  [phone]               Thank you for choosing us for your health [facility]   We are glad to serve you and happy to provide you with discharge instructions in [employer] by going to Visits < Admission Summaries. If you've been to the Emergency Department or your doctor's office, you can view your past visit information in [employer] by going to Visits < Visit Summaries. [employer] questions?

## (undated) NOTE — MR AVS SNAPSHOT
Jimi Nickerson [address]  [phone]  [phone]               Thank you for choosing us for your [facility]   We are glad to serve you and happy to provide you with [place] [address]   [phone]           Please arrive at your scheduled appointment time. Wear comfortable, loose fitting clothing.             Jan 26, 2017  3:45 PM   [place] [facility] Therapy Visit By Therapist with [place] [facility] [place] [address] [phone], [phone]  [place] [address] [place] [address]     Phone:  [phone]    - Atorvastatin Calcium 40 MG Tabs  - lisinopril 5 MG Tabs            MyChart     Visit MyChart  You can access your MyChart to more actively manage you

## (undated) NOTE — LETTER
9/26/2018          To Whom It May Concern:    Marcin Ramírez is currently under my medical care. Please allow her to use the elevator while at work for 1 month. If you require additional information please contact our office.         Sincerely,    Wi

## (undated) NOTE — LETTER
05/04/20        [address]      Dear Grzegorz Maxwell,    [facility] records indicate that you have outstanding lab work and or testing that was ordered for you and has not yet been completed:  Orders Placed This Encounter